# Patient Record
Sex: FEMALE | Race: WHITE | NOT HISPANIC OR LATINO | Employment: OTHER | URBAN - METROPOLITAN AREA
[De-identification: names, ages, dates, MRNs, and addresses within clinical notes are randomized per-mention and may not be internally consistent; named-entity substitution may affect disease eponyms.]

---

## 2017-01-01 ENCOUNTER — TELEPHONE (OUTPATIENT)
Dept: INTERNAL MEDICINE | Facility: CLINIC | Age: 82
End: 2017-01-01

## 2017-01-01 ENCOUNTER — HOSPITAL ENCOUNTER (INPATIENT)
Facility: HOSPITAL | Age: 82
LOS: 17 days | End: 2017-11-08
Attending: EMERGENCY MEDICINE | Admitting: INTERNAL MEDICINE

## 2017-01-01 ENCOUNTER — OFFICE VISIT (OUTPATIENT)
Dept: INTERNAL MEDICINE | Facility: CLINIC | Age: 82
End: 2017-01-01

## 2017-01-01 ENCOUNTER — APPOINTMENT (OUTPATIENT)
Dept: ULTRASOUND IMAGING | Facility: HOSPITAL | Age: 82
End: 2017-01-01

## 2017-01-01 ENCOUNTER — APPOINTMENT (OUTPATIENT)
Dept: CARDIOLOGY | Facility: HOSPITAL | Age: 82
End: 2017-01-01
Attending: INTERNAL MEDICINE

## 2017-01-01 ENCOUNTER — APPOINTMENT (OUTPATIENT)
Dept: GENERAL RADIOLOGY | Facility: HOSPITAL | Age: 82
End: 2017-01-01

## 2017-01-01 ENCOUNTER — EPISODE CHANGES (OUTPATIENT)
Dept: CASE MANAGEMENT | Facility: OTHER | Age: 82
End: 2017-01-01

## 2017-01-01 ENCOUNTER — APPOINTMENT (OUTPATIENT)
Dept: GENERAL RADIOLOGY | Facility: HOSPITAL | Age: 82
End: 2017-01-01
Attending: INTERNAL MEDICINE

## 2017-01-01 ENCOUNTER — HOSPITAL ENCOUNTER (INPATIENT)
Facility: HOSPITAL | Age: 82
LOS: 5 days | Discharge: HOME HEALTH | End: 2017-10-19
Attending: EMERGENCY MEDICINE | Admitting: INTERNAL MEDICINE

## 2017-01-01 ENCOUNTER — APPOINTMENT (OUTPATIENT)
Dept: CT IMAGING | Facility: HOSPITAL | Age: 82
End: 2017-01-01
Attending: INTERNAL MEDICINE

## 2017-01-01 ENCOUNTER — HOSPITAL ENCOUNTER (OUTPATIENT)
Dept: ULTRASOUND IMAGING | Facility: HOSPITAL | Age: 82
Discharge: HOME | End: 2017-01-20
Attending: INTERNAL MEDICINE | Admitting: INTERNAL MEDICINE

## 2017-01-01 VITALS
TEMPERATURE: 97.4 F | BODY MASS INDEX: 39.85 KG/M2 | HEIGHT: 60 IN | HEART RATE: 82 BPM | RESPIRATION RATE: 16 BRPM | OXYGEN SATURATION: 85 % | WEIGHT: 203 LBS | SYSTOLIC BLOOD PRESSURE: 117 MMHG | DIASTOLIC BLOOD PRESSURE: 69 MMHG

## 2017-01-01 VITALS
HEIGHT: 61 IN | DIASTOLIC BLOOD PRESSURE: 52 MMHG | BODY MASS INDEX: 38.83 KG/M2 | TEMPERATURE: 98.5 F | OXYGEN SATURATION: 91 % | SYSTOLIC BLOOD PRESSURE: 137 MMHG | WEIGHT: 205.7 LBS

## 2017-01-01 VITALS
OXYGEN SATURATION: 92 % | SYSTOLIC BLOOD PRESSURE: 119 MMHG | BODY MASS INDEX: 39.66 KG/M2 | HEART RATE: 75 BPM | DIASTOLIC BLOOD PRESSURE: 65 MMHG | WEIGHT: 202 LBS | TEMPERATURE: 98.7 F | HEIGHT: 60 IN

## 2017-01-01 VITALS
TEMPERATURE: 97.4 F | BODY MASS INDEX: 40.22 KG/M2 | RESPIRATION RATE: 20 BRPM | OXYGEN SATURATION: 92 % | SYSTOLIC BLOOD PRESSURE: 116 MMHG | DIASTOLIC BLOOD PRESSURE: 53 MMHG | HEART RATE: 89 BPM | WEIGHT: 213 LBS | HEIGHT: 61 IN

## 2017-01-01 DIAGNOSIS — I50.32 CHRONIC DIASTOLIC HEART FAILURE (HCC): ICD-10-CM

## 2017-01-01 DIAGNOSIS — R26.2 DIFFICULTY WALKING: ICD-10-CM

## 2017-01-01 DIAGNOSIS — R14.0 ABDOMINAL BLOATING: ICD-10-CM

## 2017-01-01 DIAGNOSIS — J44.9 COPD, SEVERE (HCC): ICD-10-CM

## 2017-01-01 DIAGNOSIS — I25.10 ATHEROSCLEROSIS OF NATIVE CORONARY ARTERY OF NATIVE HEART WITHOUT ANGINA PECTORIS: ICD-10-CM

## 2017-01-01 DIAGNOSIS — R33.9 URINARY RETENTION: Primary | ICD-10-CM

## 2017-01-01 DIAGNOSIS — R09.02 HYPOXIA: ICD-10-CM

## 2017-01-01 DIAGNOSIS — J18.9 PNEUMONIA OF RIGHT LOWER LOBE DUE TO INFECTIOUS ORGANISM: Primary | ICD-10-CM

## 2017-01-01 DIAGNOSIS — J96.21 ACUTE ON CHRONIC RESPIRATORY FAILURE WITH HYPOXIA (HCC): ICD-10-CM

## 2017-01-01 DIAGNOSIS — I50.33 ACUTE ON CHRONIC DIASTOLIC HEART FAILURE (HCC): ICD-10-CM

## 2017-01-01 DIAGNOSIS — Z99.3 WHEELCHAIR BOUND: ICD-10-CM

## 2017-01-01 DIAGNOSIS — R26.89 DECREASED MOBILITY: ICD-10-CM

## 2017-01-01 DIAGNOSIS — Z00.00 MEDICARE ANNUAL WELLNESS VISIT, INITIAL: Primary | ICD-10-CM

## 2017-01-01 DIAGNOSIS — I48.92 ATRIAL FLUTTER, PAROXYSMAL (HCC): ICD-10-CM

## 2017-01-01 DIAGNOSIS — R60.0 BILATERAL LEG EDEMA: ICD-10-CM

## 2017-01-01 DIAGNOSIS — I10 ESSENTIAL HYPERTENSION: Primary | ICD-10-CM

## 2017-01-01 DIAGNOSIS — Z99.81 ON HOME O2: ICD-10-CM

## 2017-01-01 DIAGNOSIS — M81.0 OP (OSTEOPOROSIS): ICD-10-CM

## 2017-01-01 DIAGNOSIS — M54.50 CHRONIC BILATERAL LOW BACK PAIN WITHOUT SCIATICA: ICD-10-CM

## 2017-01-01 DIAGNOSIS — I50.21 ACUTE SYSTOLIC CONGESTIVE HEART FAILURE (HCC): Primary | ICD-10-CM

## 2017-01-01 DIAGNOSIS — I10 ESSENTIAL HYPERTENSION: ICD-10-CM

## 2017-01-01 DIAGNOSIS — G89.29 CHRONIC BILATERAL LOW BACK PAIN WITHOUT SCIATICA: ICD-10-CM

## 2017-01-01 LAB
ALBUMIN SERPL-MCNC: 3.5 G/DL (ref 3.5–5.2)
ALBUMIN SERPL-MCNC: 3.5 G/DL (ref 3.5–5.2)
ALBUMIN SERPL-MCNC: 3.7 G/DL (ref 3.5–5.2)
ALBUMIN SERPL-MCNC: 3.8 G/DL (ref 3.5–5.2)
ALBUMIN SERPL-MCNC: 3.8 G/DL (ref 3.5–5.2)
ALBUMIN/GLOB SERPL: 0.9 G/DL
ALBUMIN/GLOB SERPL: 1.2 G/DL
ALP SERPL-CCNC: 76 U/L (ref 39–117)
ALP SERPL-CCNC: 77 U/L (ref 39–117)
ALP SERPL-CCNC: 78 U/L (ref 39–117)
ALP SERPL-CCNC: 82 U/L (ref 39–117)
ALP SERPL-CCNC: 87 U/L (ref 39–117)
ALT SERPL W P-5'-P-CCNC: 18 U/L (ref 1–33)
ALT SERPL W P-5'-P-CCNC: 22 U/L (ref 1–33)
ALT SERPL W P-5'-P-CCNC: 24 U/L (ref 1–33)
ALT SERPL-CCNC: 10 U/L (ref 1–33)
ALT SERPL-CCNC: 14 U/L (ref 1–33)
ANION GAP SERPL CALCULATED.3IONS-SCNC: 10.7 MMOL/L
ANION GAP SERPL CALCULATED.3IONS-SCNC: 10.8 MMOL/L
ANION GAP SERPL CALCULATED.3IONS-SCNC: 11.5 MMOL/L
ANION GAP SERPL CALCULATED.3IONS-SCNC: 11.8 MMOL/L
ANION GAP SERPL CALCULATED.3IONS-SCNC: 5 MMOL/L
ANION GAP SERPL CALCULATED.3IONS-SCNC: 6.4 MMOL/L
ANION GAP SERPL CALCULATED.3IONS-SCNC: 7.3 MMOL/L
ANION GAP SERPL CALCULATED.3IONS-SCNC: 7.5 MMOL/L
ANION GAP SERPL CALCULATED.3IONS-SCNC: 7.5 MMOL/L
ANION GAP SERPL CALCULATED.3IONS-SCNC: 7.6 MMOL/L
ANION GAP SERPL CALCULATED.3IONS-SCNC: 8.4 MMOL/L
ANION GAP SERPL CALCULATED.3IONS-SCNC: 8.5 MMOL/L
ANION GAP SERPL CALCULATED.3IONS-SCNC: 8.5 MMOL/L
ANION GAP SERPL CALCULATED.3IONS-SCNC: 8.6 MMOL/L
ANION GAP SERPL CALCULATED.3IONS-SCNC: 8.7 MMOL/L
ANION GAP SERPL CALCULATED.3IONS-SCNC: 8.7 MMOL/L
ANION GAP SERPL CALCULATED.3IONS-SCNC: 8.8 MMOL/L
ANION GAP SERPL CALCULATED.3IONS-SCNC: 9.1 MMOL/L
ANION GAP SERPL CALCULATED.3IONS-SCNC: 9.1 MMOL/L
ANION GAP SERPL CALCULATED.3IONS-SCNC: 9.3 MMOL/L
ANION GAP SERPL CALCULATED.3IONS-SCNC: 9.6 MMOL/L
ANION GAP SERPL CALCULATED.3IONS-SCNC: 9.9 MMOL/L
APTT PPP: 24.6 SECONDS (ref 22.7–35.4)
ARTERIAL PATENCY WRIST A: POSITIVE
ASCENDING AORTA: 3.4 CM
AST SERPL-CCNC: 15 U/L (ref 1–32)
AST SERPL-CCNC: 19 U/L (ref 1–32)
AST SERPL-CCNC: 22 U/L (ref 1–32)
AST SERPL-CCNC: 22 U/L (ref 1–32)
AST SERPL-CCNC: 23 U/L (ref 1–32)
ATMOSPHERIC PRESS: 743 MMHG
ATMOSPHERIC PRESS: 744.5 MMHG
ATMOSPHERIC PRESS: 749.7 MMHG
B PERT DNA SPEC QL NAA+PROBE: NOT DETECTED
B PERT DNA SPEC QL NAA+PROBE: NOT DETECTED
BACTERIA SPEC AEROBE CULT: NORMAL
BACTERIA SPEC AEROBE CULT: NORMAL
BASE EXCESS BLDA CALC-SCNC: 13 MMOL/L (ref 0–2)
BASE EXCESS BLDA CALC-SCNC: 16.5 MMOL/L (ref 0–2)
BASE EXCESS BLDA CALC-SCNC: 17.5 MMOL/L (ref 0–2)
BASOPHILS # BLD AUTO: 0 10*3/MM3 (ref 0–0.2)
BASOPHILS # BLD AUTO: 0.01 10*3/MM3 (ref 0–0.2)
BASOPHILS # BLD AUTO: 0.02 10*3/MM3 (ref 0–0.2)
BASOPHILS # BLD AUTO: 0.03 10*3/MM3 (ref 0–0.2)
BASOPHILS NFR BLD AUTO: 0 % (ref 0–1.5)
BASOPHILS NFR BLD AUTO: 0.1 % (ref 0–1.5)
BASOPHILS NFR BLD AUTO: 0.2 % (ref 0–1.5)
BASOPHILS NFR BLD AUTO: 0.3 % (ref 0–1.5)
BASOPHILS NFR BLD AUTO: 0.3 % (ref 0–1.5)
BASOPHILS NFR BLD AUTO: 0.4 % (ref 0–1.5)
BDY SITE: ABNORMAL
BH CV ECHO MEAS - ACS: 1.4 CM
BH CV ECHO MEAS - AI DEC SLOPE: 241 CM/SEC^2
BH CV ECHO MEAS - AI MAX PG: 46.8 MMHG
BH CV ECHO MEAS - AI MAX VEL: 342 CM/SEC
BH CV ECHO MEAS - AI P1/2T: 415.6 MSEC
BH CV ECHO MEAS - AO MAX PG (FULL): 4.9 MMHG
BH CV ECHO MEAS - AO MAX PG: 7.3 MMHG
BH CV ECHO MEAS - AO MEAN PG (FULL): 3 MMHG
BH CV ECHO MEAS - AO MEAN PG: 4 MMHG
BH CV ECHO MEAS - AO ROOT AREA (BSA CORRECTED): 1.7
BH CV ECHO MEAS - AO ROOT AREA: 8.6 CM^2
BH CV ECHO MEAS - AO ROOT DIAM: 3.3 CM
BH CV ECHO MEAS - AO V2 MAX: 135 CM/SEC
BH CV ECHO MEAS - AO V2 MEAN: 99.1 CM/SEC
BH CV ECHO MEAS - AO V2 VTI: 23.7 CM
BH CV ECHO MEAS - ASC AORTA: 3.4 CM
BH CV ECHO MEAS - AVA(I,A): 1.3 CM^2
BH CV ECHO MEAS - AVA(I,D): 1.3 CM^2
BH CV ECHO MEAS - AVA(V,A): 1.4 CM^2
BH CV ECHO MEAS - AVA(V,D): 1.4 CM^2
BH CV ECHO MEAS - BSA(HAYCOCK): 2.1 M^2
BH CV ECHO MEAS - BSA: 2 M^2
BH CV ECHO MEAS - BZI_BMI: 41 KILOGRAMS/M^2
BH CV ECHO MEAS - BZI_METRIC_HEIGHT: 154.9 CM
BH CV ECHO MEAS - BZI_METRIC_WEIGHT: 98.4 KG
BH CV ECHO MEAS - CONTRAST EF (2CH): 62.5 ML/M^2
BH CV ECHO MEAS - CONTRAST EF 4CH: 57.6 ML/M^2
BH CV ECHO MEAS - EDV(CUBED): 64 ML
BH CV ECHO MEAS - EDV(MOD-SP2): 64 ML
BH CV ECHO MEAS - EDV(MOD-SP4): 66 ML
BH CV ECHO MEAS - EDV(TEICH): 70 ML
BH CV ECHO MEAS - EF(CUBED): 72.5 %
BH CV ECHO MEAS - EF(MOD-SP2): 62.5 %
BH CV ECHO MEAS - EF(MOD-SP4): 57.6 %
BH CV ECHO MEAS - EF(TEICH): 64.8 %
BH CV ECHO MEAS - ESV(CUBED): 17.6 ML
BH CV ECHO MEAS - ESV(MOD-SP2): 24 ML
BH CV ECHO MEAS - ESV(MOD-SP4): 28 ML
BH CV ECHO MEAS - ESV(TEICH): 24.6 ML
BH CV ECHO MEAS - FS: 35 %
BH CV ECHO MEAS - IVS/LVPW: 1.6
BH CV ECHO MEAS - IVSD: 1.1 CM
BH CV ECHO MEAS - LAT PEAK E' VEL: 13 CM/SEC
BH CV ECHO MEAS - LV DIASTOLIC VOL/BSA (35-75): 33.7 ML/M^2
BH CV ECHO MEAS - LV MASS(C)D: 109.7 GRAMS
BH CV ECHO MEAS - LV MASS(C)DI: 56.1 GRAMS/M^2
BH CV ECHO MEAS - LV MAX PG: 2.4 MMHG
BH CV ECHO MEAS - LV MEAN PG: 1 MMHG
BH CV ECHO MEAS - LV SYSTOLIC VOL/BSA (12-30): 14.3 ML/M^2
BH CV ECHO MEAS - LV V1 MAX: 76.9 CM/SEC
BH CV ECHO MEAS - LV V1 MEAN: 49.7 CM/SEC
BH CV ECHO MEAS - LV V1 VTI: 12.3 CM
BH CV ECHO MEAS - LVIDD: 4 CM
BH CV ECHO MEAS - LVIDS: 2.6 CM
BH CV ECHO MEAS - LVLD AP2: 7.1 CM
BH CV ECHO MEAS - LVLD AP4: 6.9 CM
BH CV ECHO MEAS - LVLS AP2: 5.7 CM
BH CV ECHO MEAS - LVLS AP4: 5.6 CM
BH CV ECHO MEAS - LVOT AREA (M): 2.5 CM^2
BH CV ECHO MEAS - LVOT AREA: 2.5 CM^2
BH CV ECHO MEAS - LVOT DIAM: 1.8 CM
BH CV ECHO MEAS - LVPWD: 0.7 CM
BH CV ECHO MEAS - MED PEAK E' VEL: 9 CM/SEC
BH CV ECHO MEAS - MR MAX PG: 85 MMHG
BH CV ECHO MEAS - MR MAX VEL: 461 CM/SEC
BH CV ECHO MEAS - MV DEC SLOPE: 555 CM/SEC^2
BH CV ECHO MEAS - MV DEC TIME: 0.17 SEC
BH CV ECHO MEAS - MV E MAX VEL: 111 CM/SEC
BH CV ECHO MEAS - MV MAX PG: 5.7 MMHG
BH CV ECHO MEAS - MV MEAN PG: 3 MMHG
BH CV ECHO MEAS - MV P1/2T MAX VEL: 123 CM/SEC
BH CV ECHO MEAS - MV P1/2T: 64.9 MSEC
BH CV ECHO MEAS - MV V2 MAX: 119 CM/SEC
BH CV ECHO MEAS - MV V2 MEAN: 87 CM/SEC
BH CV ECHO MEAS - MV V2 VTI: 17.7 CM
BH CV ECHO MEAS - MVA P1/2T LCG: 1.8 CM^2
BH CV ECHO MEAS - MVA(P1/2T): 3.4 CM^2
BH CV ECHO MEAS - MVA(VTI): 1.8 CM^2
BH CV ECHO MEAS - PA ACC TIME: 0.05 SEC
BH CV ECHO MEAS - PA MAX PG (FULL): 0.64 MMHG
BH CV ECHO MEAS - PA MAX PG: 1.9 MMHG
BH CV ECHO MEAS - PA PR(ACCEL): 55.2 MMHG
BH CV ECHO MEAS - PA V2 MAX: 69.2 CM/SEC
BH CV ECHO MEAS - PI END-D VEL: 133 CM/SEC
BH CV ECHO MEAS - PVA(V,A): 1.4 CM^2
BH CV ECHO MEAS - PVA(V,D): 1.4 CM^2
BH CV ECHO MEAS - QP/QS: 0.48
BH CV ECHO MEAS - RAP SYSTOLE: 8 MMHG
BH CV ECHO MEAS - RV MAX PG: 1.3 MMHG
BH CV ECHO MEAS - RV MEAN PG: 0.5 MMHG
BH CV ECHO MEAS - RV V1 MAX: 55.8 CM/SEC
BH CV ECHO MEAS - RV V1 MEAN: 33.7 CM/SEC
BH CV ECHO MEAS - RV V1 VTI: 8.4 CM
BH CV ECHO MEAS - RVOT AREA: 1.8 CM^2
BH CV ECHO MEAS - RVOT DIAM: 1.5 CM
BH CV ECHO MEAS - RVSP: 42.8 MMHG
BH CV ECHO MEAS - SI(AO): 103.6 ML/M^2
BH CV ECHO MEAS - SI(CUBED): 23.7 ML/M^2
BH CV ECHO MEAS - SI(LVOT): 16 ML/M^2
BH CV ECHO MEAS - SI(MOD-SP2): 20.5 ML/M^2
BH CV ECHO MEAS - SI(MOD-SP4): 19.4 ML/M^2
BH CV ECHO MEAS - SI(TEICH): 23.2 ML/M^2
BH CV ECHO MEAS - SV(AO): 202.7 ML
BH CV ECHO MEAS - SV(CUBED): 46.4 ML
BH CV ECHO MEAS - SV(LVOT): 31.3 ML
BH CV ECHO MEAS - SV(MOD-SP2): 40 ML
BH CV ECHO MEAS - SV(MOD-SP4): 38 ML
BH CV ECHO MEAS - SV(RVOT): 14.9 ML
BH CV ECHO MEAS - SV(TEICH): 45.4 ML
BH CV ECHO MEAS - TAPSE (>1.6): 1 CM2
BH CV ECHO MEAS - TR MAX VEL: 295 CM/SEC
BH CV LOWER VASCULAR LEFT COMMON FEMORAL AUGMENT: NORMAL
BH CV LOWER VASCULAR LEFT COMMON FEMORAL COMPETENT: NORMAL
BH CV LOWER VASCULAR LEFT COMMON FEMORAL COMPRESS: NORMAL
BH CV LOWER VASCULAR LEFT COMMON FEMORAL PHASIC: NORMAL
BH CV LOWER VASCULAR LEFT COMMON FEMORAL SPONT: NORMAL
BH CV LOWER VASCULAR LEFT DISTAL FEMORAL COMPRESS: NORMAL
BH CV LOWER VASCULAR LEFT GASTRONEMIUS COMPRESS: NORMAL
BH CV LOWER VASCULAR LEFT GREATER SAPH AK COMPRESS: NORMAL
BH CV LOWER VASCULAR LEFT GREATER SAPH BK COMPRESS: NORMAL
BH CV LOWER VASCULAR LEFT MID FEMORAL AUGMENT: NORMAL
BH CV LOWER VASCULAR LEFT MID FEMORAL COMPETENT: NORMAL
BH CV LOWER VASCULAR LEFT MID FEMORAL COMPRESS: NORMAL
BH CV LOWER VASCULAR LEFT MID FEMORAL PHASIC: NORMAL
BH CV LOWER VASCULAR LEFT MID FEMORAL SPONT: NORMAL
BH CV LOWER VASCULAR LEFT PERONEAL COMPRESS: NORMAL
BH CV LOWER VASCULAR LEFT POPLITEAL AUGMENT: NORMAL
BH CV LOWER VASCULAR LEFT POPLITEAL COMPETENT: NORMAL
BH CV LOWER VASCULAR LEFT POPLITEAL COMPRESS: NORMAL
BH CV LOWER VASCULAR LEFT POPLITEAL PHASIC: NORMAL
BH CV LOWER VASCULAR LEFT POPLITEAL SPONT: NORMAL
BH CV LOWER VASCULAR LEFT POSTERIOR TIBIAL COMPRESS: NORMAL
BH CV LOWER VASCULAR LEFT PROXIMAL FEMORAL COMPRESS: NORMAL
BH CV LOWER VASCULAR LEFT SAPHENOFEMORAL JUNCTION AUGMENT: NORMAL
BH CV LOWER VASCULAR LEFT SAPHENOFEMORAL JUNCTION COMPRESS: NORMAL
BH CV LOWER VASCULAR LEFT SAPHENOFEMORAL JUNCTION PHASIC: NORMAL
BH CV LOWER VASCULAR LEFT SAPHENOFEMORAL JUNCTION SPONT: NORMAL
BH CV LOWER VASCULAR RIGHT COMMON FEMORAL AUGMENT: NORMAL
BH CV LOWER VASCULAR RIGHT COMMON FEMORAL COMPETENT: NORMAL
BH CV LOWER VASCULAR RIGHT COMMON FEMORAL COMPRESS: NORMAL
BH CV LOWER VASCULAR RIGHT COMMON FEMORAL PHASIC: NORMAL
BH CV LOWER VASCULAR RIGHT COMMON FEMORAL SPONT: NORMAL
BH CV LOWER VASCULAR RIGHT DISTAL FEMORAL COMPRESS: NORMAL
BH CV LOWER VASCULAR RIGHT GASTRONEMIUS COMPRESS: NORMAL
BH CV LOWER VASCULAR RIGHT GREATER SAPH AK COMPRESS: NORMAL
BH CV LOWER VASCULAR RIGHT GREATER SAPH BK COMPRESS: NORMAL
BH CV LOWER VASCULAR RIGHT MID FEMORAL AUGMENT: NORMAL
BH CV LOWER VASCULAR RIGHT MID FEMORAL COMPETENT: NORMAL
BH CV LOWER VASCULAR RIGHT MID FEMORAL COMPRESS: NORMAL
BH CV LOWER VASCULAR RIGHT MID FEMORAL PHASIC: NORMAL
BH CV LOWER VASCULAR RIGHT MID FEMORAL SPONT: NORMAL
BH CV LOWER VASCULAR RIGHT PERONEAL COMPRESS: NORMAL
BH CV LOWER VASCULAR RIGHT POPLITEAL AUGMENT: NORMAL
BH CV LOWER VASCULAR RIGHT POPLITEAL COMPETENT: NORMAL
BH CV LOWER VASCULAR RIGHT POPLITEAL COMPRESS: NORMAL
BH CV LOWER VASCULAR RIGHT POPLITEAL PHASIC: NORMAL
BH CV LOWER VASCULAR RIGHT POPLITEAL SPONT: NORMAL
BH CV LOWER VASCULAR RIGHT POSTERIOR TIBIAL COMPRESS: NORMAL
BH CV LOWER VASCULAR RIGHT PROXIMAL FEMORAL COMPRESS: NORMAL
BH CV LOWER VASCULAR RIGHT SAPHENOFEMORAL JUNCTION AUGMENT: NORMAL
BH CV LOWER VASCULAR RIGHT SAPHENOFEMORAL JUNCTION COMPRESS: NORMAL
BH CV LOWER VASCULAR RIGHT SAPHENOFEMORAL JUNCTION PHASIC: NORMAL
BH CV LOWER VASCULAR RIGHT SAPHENOFEMORAL JUNCTION SPONT: NORMAL
BH CV XLRA - RV BASE: 3.32 CM
BH CV XLRA - RV LENGTH: 7.66 CM
BH CV XLRA - RV MID: 3.97 CM
BH CV XLRA - TDI S': 9 CM/SEC
BILIRUB DIRECT SERPL-MCNC: <0.2 MG/DL (ref 0–0.3)
BILIRUB SERPL-MCNC: 0.2 MG/DL (ref 0.1–1.2)
BILIRUB SERPL-MCNC: 0.2 MG/DL (ref 0.1–1.2)
BILIRUB SERPL-MCNC: 0.6 MG/DL (ref 0.1–1.2)
BILIRUB SERPL-MCNC: 0.6 MG/DL (ref 0.1–1.2)
BILIRUB SERPL-MCNC: 0.7 MG/DL (ref 0.1–1.2)
BILIRUB UR QL STRIP: NEGATIVE
BILIRUB UR QL STRIP: NEGATIVE
BUN BLD-MCNC: 24 MG/DL (ref 8–23)
BUN BLD-MCNC: 25 MG/DL (ref 8–23)
BUN BLD-MCNC: 26 MG/DL (ref 8–23)
BUN BLD-MCNC: 26 MG/DL (ref 8–23)
BUN BLD-MCNC: 28 MG/DL (ref 8–23)
BUN BLD-MCNC: 30 MG/DL (ref 8–23)
BUN BLD-MCNC: 31 MG/DL (ref 8–23)
BUN BLD-MCNC: 32 MG/DL (ref 8–23)
BUN BLD-MCNC: 32 MG/DL (ref 8–23)
BUN BLD-MCNC: 33 MG/DL (ref 8–23)
BUN BLD-MCNC: 34 MG/DL (ref 8–23)
BUN BLD-MCNC: 35 MG/DL (ref 8–23)
BUN BLD-MCNC: 36 MG/DL (ref 8–23)
BUN BLD-MCNC: 36 MG/DL (ref 8–23)
BUN BLD-MCNC: 38 MG/DL (ref 8–23)
BUN BLD-MCNC: 39 MG/DL (ref 8–23)
BUN BLD-MCNC: 39 MG/DL (ref 8–23)
BUN SERPL-MCNC: 22 MG/DL (ref 8–23)
BUN/CREAT SERPL: 22.1 (ref 7–25)
BUN/CREAT SERPL: 23.4 (ref 7–25)
BUN/CREAT SERPL: 25.6 (ref 7–25)
BUN/CREAT SERPL: 26.7 (ref 7–25)
BUN/CREAT SERPL: 28.3 (ref 7–25)
BUN/CREAT SERPL: 29.1 (ref 7–25)
BUN/CREAT SERPL: 29.2 (ref 7–25)
BUN/CREAT SERPL: 29.4 (ref 7–25)
BUN/CREAT SERPL: 32.6 (ref 7–25)
BUN/CREAT SERPL: 32.6 (ref 7–25)
BUN/CREAT SERPL: 33.3 (ref 7–25)
BUN/CREAT SERPL: 35.1 (ref 7–25)
BUN/CREAT SERPL: 38.1 (ref 7–25)
BUN/CREAT SERPL: 39 (ref 7–25)
BUN/CREAT SERPL: 39.2 (ref 7–25)
BUN/CREAT SERPL: 40.9 (ref 7–25)
BUN/CREAT SERPL: 41.8 (ref 7–25)
BUN/CREAT SERPL: 42.2 (ref 7–25)
BUN/CREAT SERPL: 44.3 (ref 7–25)
BUN/CREAT SERPL: 45 (ref 7–25)
BUN/CREAT SERPL: 45.3 (ref 7–25)
BUN/CREAT SERPL: 50 (ref 7–25)
BUN/CREAT SERPL: 51.5 (ref 7–25)
C PNEUM DNA NPH QL NAA+NON-PROBE: NOT DETECTED
C PNEUM DNA NPH QL NAA+NON-PROBE: NOT DETECTED
CALCIUM SERPL-MCNC: 9.3 MG/DL (ref 8.6–10.5)
CALCIUM SPEC-SCNC: 10 MG/DL (ref 8.6–10.5)
CALCIUM SPEC-SCNC: 10.1 MG/DL (ref 8.6–10.5)
CALCIUM SPEC-SCNC: 10.1 MG/DL (ref 8.6–10.5)
CALCIUM SPEC-SCNC: 7.9 MG/DL (ref 8.6–10.5)
CALCIUM SPEC-SCNC: 8.2 MG/DL (ref 8.6–10.5)
CALCIUM SPEC-SCNC: 8.4 MG/DL (ref 8.6–10.5)
CALCIUM SPEC-SCNC: 8.5 MG/DL (ref 8.6–10.5)
CALCIUM SPEC-SCNC: 8.6 MG/DL (ref 8.6–10.5)
CALCIUM SPEC-SCNC: 8.7 MG/DL (ref 8.6–10.5)
CALCIUM SPEC-SCNC: 8.8 MG/DL (ref 8.6–10.5)
CALCIUM SPEC-SCNC: 9 MG/DL (ref 8.6–10.5)
CALCIUM SPEC-SCNC: 9.2 MG/DL (ref 8.6–10.5)
CALCIUM SPEC-SCNC: 9.2 MG/DL (ref 8.6–10.5)
CALCIUM SPEC-SCNC: 9.3 MG/DL (ref 8.6–10.5)
CALCIUM SPEC-SCNC: 9.3 MG/DL (ref 8.6–10.5)
CALCIUM SPEC-SCNC: 9.4 MG/DL (ref 8.6–10.5)
CALCIUM SPEC-SCNC: 9.4 MG/DL (ref 8.6–10.5)
CALCIUM SPEC-SCNC: 9.5 MG/DL (ref 8.6–10.5)
CALCIUM SPEC-SCNC: 9.9 MG/DL (ref 8.6–10.5)
CALCIUM SPEC-SCNC: 9.9 MG/DL (ref 8.6–10.5)
CHLORIDE SERPL-SCNC: 100 MMOL/L (ref 98–107)
CHLORIDE SERPL-SCNC: 101 MMOL/L (ref 98–107)
CHLORIDE SERPL-SCNC: 103 MMOL/L (ref 98–107)
CHLORIDE SERPL-SCNC: 93 MMOL/L (ref 98–107)
CHLORIDE SERPL-SCNC: 93 MMOL/L (ref 98–107)
CHLORIDE SERPL-SCNC: 96 MMOL/L (ref 98–107)
CHLORIDE SERPL-SCNC: 97 MMOL/L (ref 98–107)
CHLORIDE SERPL-SCNC: 97 MMOL/L (ref 98–107)
CHLORIDE SERPL-SCNC: 98 MMOL/L (ref 98–107)
CHLORIDE SERPL-SCNC: 99 MMOL/L (ref 98–107)
CHOLEST SERPL-MCNC: 128 MG/DL (ref 0–200)
CLARITY UR: CLEAR
CLARITY UR: CLEAR
CO2 SERPL-SCNC: 32.9 MMOL/L (ref 22–29)
CO2 SERPL-SCNC: 33.3 MMOL/L (ref 22–29)
CO2 SERPL-SCNC: 33.5 MMOL/L (ref 22–29)
CO2 SERPL-SCNC: 35.3 MMOL/L (ref 22–29)
CO2 SERPL-SCNC: 35.6 MMOL/L (ref 22–29)
CO2 SERPL-SCNC: 37.9 MMOL/L (ref 22–29)
CO2 SERPL-SCNC: 37.9 MMOL/L (ref 22–29)
CO2 SERPL-SCNC: 38.2 MMOL/L (ref 22–29)
CO2 SERPL-SCNC: 39.2 MMOL/L (ref 22–29)
CO2 SERPL-SCNC: 39.4 MMOL/L (ref 22–29)
CO2 SERPL-SCNC: 39.6 MMOL/L (ref 22–29)
CO2 SERPL-SCNC: 39.7 MMOL/L (ref 22–29)
CO2 SERPL-SCNC: 40.1 MMOL/L (ref 22–29)
CO2 SERPL-SCNC: 40.3 MMOL/L (ref 22–29)
CO2 SERPL-SCNC: 40.5 MMOL/L (ref 22–29)
CO2 SERPL-SCNC: 40.5 MMOL/L (ref 22–29)
CO2 SERPL-SCNC: 40.7 MMOL/L (ref 22–29)
CO2 SERPL-SCNC: 41.4 MMOL/L (ref 22–29)
CO2 SERPL-SCNC: 42.4 MMOL/L (ref 22–29)
CO2 SERPL-SCNC: 43 MMOL/L (ref 22–29)
CO2 SERPL-SCNC: 44.5 MMOL/L (ref 22–29)
CO2 SERPL-SCNC: 45.2 MMOL/L (ref 22–29)
CO2 SERPL-SCNC: 47.5 MMOL/L (ref 22–29)
COLOR UR: YELLOW
COLOR UR: YELLOW
CREAT BLD-MCNC: 0.63 MG/DL (ref 0.57–1)
CREAT BLD-MCNC: 0.68 MG/DL (ref 0.57–1)
CREAT BLD-MCNC: 0.75 MG/DL (ref 0.57–1)
CREAT BLD-MCNC: 0.78 MG/DL (ref 0.57–1)
CREAT BLD-MCNC: 0.78 MG/DL (ref 0.57–1)
CREAT BLD-MCNC: 0.8 MG/DL (ref 0.57–1)
CREAT BLD-MCNC: 0.82 MG/DL (ref 0.57–1)
CREAT BLD-MCNC: 0.85 MG/DL (ref 0.57–1)
CREAT BLD-MCNC: 0.88 MG/DL (ref 0.57–1)
CREAT BLD-MCNC: 0.88 MG/DL (ref 0.57–1)
CREAT BLD-MCNC: 0.9 MG/DL (ref 0.57–1)
CREAT BLD-MCNC: 0.91 MG/DL (ref 0.57–1)
CREAT BLD-MCNC: 0.94 MG/DL (ref 0.57–1)
CREAT BLD-MCNC: 0.95 MG/DL (ref 0.57–1)
CREAT BLD-MCNC: 0.95 MG/DL (ref 0.57–1)
CREAT BLD-MCNC: 0.96 MG/DL (ref 0.57–1)
CREAT BLD-MCNC: 0.97 MG/DL (ref 0.57–1)
CREAT BLD-MCNC: 1.06 MG/DL (ref 0.57–1)
CREAT BLD-MCNC: 1.1 MG/DL (ref 0.57–1)
CREAT BLD-MCNC: 1.11 MG/DL (ref 0.57–1)
CREAT BLD-MCNC: 1.16 MG/DL (ref 0.57–1)
CREAT BLD-MCNC: 1.4 MG/DL (ref 0.57–1)
CREAT SERPL-MCNC: 0.86 MG/DL (ref 0.57–1)
D DIMER PPP FEU-MCNC: 0.56 MCGFEU/ML (ref 0–0.49)
D-LACTATE SERPL-SCNC: 0.7 MMOL/L (ref 0.5–2)
D-LACTATE SERPL-SCNC: 1.3 MMOL/L (ref 0.5–2)
DEPRECATED RDW RBC AUTO: 62.4 FL (ref 37–54)
DEPRECATED RDW RBC AUTO: 62.6 FL (ref 37–54)
DEPRECATED RDW RBC AUTO: 62.9 FL (ref 37–54)
DEPRECATED RDW RBC AUTO: 63.3 FL (ref 37–54)
DEPRECATED RDW RBC AUTO: 63.5 FL (ref 37–54)
DEPRECATED RDW RBC AUTO: 64.1 FL (ref 37–54)
DEPRECATED RDW RBC AUTO: 64.1 FL (ref 37–54)
DEPRECATED RDW RBC AUTO: 64.4 FL (ref 37–54)
DEPRECATED RDW RBC AUTO: 64.7 FL (ref 37–54)
DEPRECATED RDW RBC AUTO: 64.7 FL (ref 37–54)
DEPRECATED RDW RBC AUTO: 65 FL (ref 37–54)
DEPRECATED RDW RBC AUTO: 65.3 FL (ref 37–54)
DIGOXIN SERPL-MCNC: 0.8 NG/ML (ref 0.6–1.2)
DIGOXIN SERPL-MCNC: 0.9 NG/ML (ref 0.6–1.2)
DIGOXIN SERPL-MCNC: 0.9 NG/ML (ref 0.6–1.2)
E/E' RATIO: 11
EOSINOPHIL # BLD AUTO: 0 10*3/MM3 (ref 0–0.7)
EOSINOPHIL # BLD AUTO: 0.01 10*3/MM3 (ref 0–0.7)
EOSINOPHIL # BLD AUTO: 0.08 10*3/MM3 (ref 0–0.7)
EOSINOPHIL # BLD AUTO: 0.09 10*3/MM3 (ref 0–0.7)
EOSINOPHIL # BLD AUTO: 0.1 10*3/MM3 (ref 0–0.7)
EOSINOPHIL # BLD AUTO: 0.13 10*3/MM3 (ref 0–0.7)
EOSINOPHIL # BLD AUTO: 0.19 10*3/MM3 (ref 0–0.7)
EOSINOPHIL # BLD AUTO: 0.2 10*3/MM3 (ref 0–0.7)
EOSINOPHIL # BLD AUTO: 0.24 10*3/MM3 (ref 0–0.7)
EOSINOPHIL # BLD AUTO: 0.25 10*3/MM3 (ref 0–0.7)
EOSINOPHIL # BLD AUTO: 0.32 10*3/MM3 (ref 0–0.7)
EOSINOPHIL NFR BLD AUTO: 0 % (ref 0.3–6.2)
EOSINOPHIL NFR BLD AUTO: 0.1 % (ref 0.3–6.2)
EOSINOPHIL NFR BLD AUTO: 1 % (ref 0.3–6.2)
EOSINOPHIL NFR BLD AUTO: 1.1 % (ref 0.3–6.2)
EOSINOPHIL NFR BLD AUTO: 1.1 % (ref 0.3–6.2)
EOSINOPHIL NFR BLD AUTO: 1.8 % (ref 0.3–6.2)
EOSINOPHIL NFR BLD AUTO: 2.6 % (ref 0.3–6.2)
EOSINOPHIL NFR BLD AUTO: 3 % (ref 0.3–6.2)
EOSINOPHIL NFR BLD AUTO: 3.1 % (ref 0.3–6.2)
EOSINOPHIL NFR BLD AUTO: 3.1 % (ref 0.3–6.2)
EOSINOPHIL NFR BLD AUTO: 3.8 % (ref 0.3–6.2)
ERYTHROCYTE [DISTWIDTH] IN BLOOD BY AUTOMATED COUNT: 16.3 % (ref 11.7–13)
ERYTHROCYTE [DISTWIDTH] IN BLOOD BY AUTOMATED COUNT: 17 % (ref 11.7–13)
ERYTHROCYTE [DISTWIDTH] IN BLOOD BY AUTOMATED COUNT: 17.1 % (ref 11.7–13)
ERYTHROCYTE [DISTWIDTH] IN BLOOD BY AUTOMATED COUNT: 17.1 % (ref 11.7–13)
ERYTHROCYTE [DISTWIDTH] IN BLOOD BY AUTOMATED COUNT: 17.2 % (ref 11.7–13)
ERYTHROCYTE [DISTWIDTH] IN BLOOD BY AUTOMATED COUNT: 17.4 % (ref 11.7–13)
ERYTHROCYTE [DISTWIDTH] IN BLOOD BY AUTOMATED COUNT: 17.5 % (ref 11.7–13)
ERYTHROCYTE [DISTWIDTH] IN BLOOD BY AUTOMATED COUNT: 17.5 % (ref 11.7–13)
ERYTHROCYTE [DISTWIDTH] IN BLOOD BY AUTOMATED COUNT: 17.7 % (ref 11.7–13)
ERYTHROCYTE [DISTWIDTH] IN BLOOD BY AUTOMATED COUNT: 17.8 % (ref 11.7–13)
ERYTHROCYTE [DISTWIDTH] IN BLOOD BY AUTOMATED COUNT: 18 % (ref 11.7–13)
FLUAV H1 2009 PAND RNA NPH QL NAA+PROBE: NOT DETECTED
FLUAV H1 2009 PAND RNA NPH QL NAA+PROBE: NOT DETECTED
FLUAV H1 HA GENE NPH QL NAA+PROBE: NOT DETECTED
FLUAV H1 HA GENE NPH QL NAA+PROBE: NOT DETECTED
FLUAV H3 RNA NPH QL NAA+PROBE: NOT DETECTED
FLUAV H3 RNA NPH QL NAA+PROBE: NOT DETECTED
FLUAV SUBTYP SPEC NAA+PROBE: NOT DETECTED
FLUAV SUBTYP SPEC NAA+PROBE: NOT DETECTED
FLUBV RNA ISLT QL NAA+PROBE: NOT DETECTED
FLUBV RNA ISLT QL NAA+PROBE: NOT DETECTED
GAS FLOW AIRWAY: 10 LPM
GAS FLOW AIRWAY: 4 LPM
GAS FLOW AIRWAY: 8 LPM
GFR SERPL CREATININE-BSD FRML MDRD: 36 ML/MIN/1.73
GFR SERPL CREATININE-BSD FRML MDRD: 44 ML/MIN/1.73
GFR SERPL CREATININE-BSD FRML MDRD: 47 ML/MIN/1.73
GFR SERPL CREATININE-BSD FRML MDRD: 47 ML/MIN/1.73
GFR SERPL CREATININE-BSD FRML MDRD: 49 ML/MIN/1.73
GFR SERPL CREATININE-BSD FRML MDRD: 55 ML/MIN/1.73
GFR SERPL CREATININE-BSD FRML MDRD: 55 ML/MIN/1.73
GFR SERPL CREATININE-BSD FRML MDRD: 56 ML/MIN/1.73
GFR SERPL CREATININE-BSD FRML MDRD: 56 ML/MIN/1.73
GFR SERPL CREATININE-BSD FRML MDRD: 57 ML/MIN/1.73
GFR SERPL CREATININE-BSD FRML MDRD: 59 ML/MIN/1.73
GFR SERPL CREATININE-BSD FRML MDRD: 60 ML/MIN/1.73
GFR SERPL CREATININE-BSD FRML MDRD: 61 ML/MIN/1.73
GFR SERPL CREATININE-BSD FRML MDRD: 61 ML/MIN/1.73
GFR SERPL CREATININE-BSD FRML MDRD: 64 ML/MIN/1.73
GFR SERPL CREATININE-BSD FRML MDRD: 66 ML/MIN/1.73
GFR SERPL CREATININE-BSD FRML MDRD: 68 ML/MIN/1.73
GFR SERPL CREATININE-BSD FRML MDRD: 70 ML/MIN/1.73
GFR SERPL CREATININE-BSD FRML MDRD: 70 ML/MIN/1.73
GFR SERPL CREATININE-BSD FRML MDRD: 73 ML/MIN/1.73
GFR SERPL CREATININE-BSD FRML MDRD: 82 ML/MIN/1.73
GFR SERPL CREATININE-BSD FRML MDRD: 90 ML/MIN/1.73
GLOBULIN SER CALC-MCNC: 3.3 GM/DL
GLOBULIN UR ELPH-MCNC: 3.8 GM/DL
GLOBULIN UR ELPH-MCNC: 4.1 GM/DL
GLOBULIN UR ELPH-MCNC: 4.1 GM/DL
GLUCOSE BLD-MCNC: 103 MG/DL (ref 65–99)
GLUCOSE BLD-MCNC: 104 MG/DL (ref 65–99)
GLUCOSE BLD-MCNC: 109 MG/DL (ref 65–99)
GLUCOSE BLD-MCNC: 109 MG/DL (ref 65–99)
GLUCOSE BLD-MCNC: 111 MG/DL (ref 65–99)
GLUCOSE BLD-MCNC: 117 MG/DL (ref 65–99)
GLUCOSE BLD-MCNC: 118 MG/DL (ref 65–99)
GLUCOSE BLD-MCNC: 125 MG/DL (ref 65–99)
GLUCOSE BLD-MCNC: 131 MG/DL (ref 65–99)
GLUCOSE BLD-MCNC: 132 MG/DL (ref 65–99)
GLUCOSE BLD-MCNC: 147 MG/DL (ref 65–99)
GLUCOSE BLD-MCNC: 150 MG/DL (ref 65–99)
GLUCOSE BLD-MCNC: 152 MG/DL (ref 65–99)
GLUCOSE BLD-MCNC: 162 MG/DL (ref 65–99)
GLUCOSE BLD-MCNC: 175 MG/DL (ref 65–99)
GLUCOSE BLD-MCNC: 176 MG/DL (ref 65–99)
GLUCOSE BLD-MCNC: 197 MG/DL (ref 65–99)
GLUCOSE BLD-MCNC: 198 MG/DL (ref 65–99)
GLUCOSE BLD-MCNC: 214 MG/DL (ref 65–99)
GLUCOSE BLD-MCNC: 226 MG/DL (ref 65–99)
GLUCOSE BLD-MCNC: 250 MG/DL (ref 65–99)
GLUCOSE BLD-MCNC: 92 MG/DL (ref 65–99)
GLUCOSE BLDC GLUCOMTR-MCNC: 115 MG/DL (ref 70–130)
GLUCOSE BLDC GLUCOMTR-MCNC: 124 MG/DL (ref 70–130)
GLUCOSE SERPL-MCNC: 104 MG/DL (ref 65–99)
GLUCOSE UR STRIP-MCNC: NEGATIVE MG/DL
GLUCOSE UR STRIP-MCNC: NEGATIVE MG/DL
HADV DNA SPEC NAA+PROBE: NOT DETECTED
HADV DNA SPEC NAA+PROBE: NOT DETECTED
HBA1C MFR BLD: 5.9 % (ref 4.8–5.6)
HCO3 BLDA-SCNC: 43.4 MMOL/L (ref 22–28)
HCO3 BLDA-SCNC: 44.9 MMOL/L (ref 22–28)
HCO3 BLDA-SCNC: 46 MMOL/L (ref 22–28)
HCOV 229E RNA SPEC QL NAA+PROBE: NOT DETECTED
HCOV 229E RNA SPEC QL NAA+PROBE: NOT DETECTED
HCOV HKU1 RNA SPEC QL NAA+PROBE: NOT DETECTED
HCOV HKU1 RNA SPEC QL NAA+PROBE: NOT DETECTED
HCOV NL63 RNA SPEC QL NAA+PROBE: NOT DETECTED
HCOV NL63 RNA SPEC QL NAA+PROBE: NOT DETECTED
HCOV OC43 RNA SPEC QL NAA+PROBE: NOT DETECTED
HCOV OC43 RNA SPEC QL NAA+PROBE: NOT DETECTED
HCT VFR BLD AUTO: 31.8 % (ref 35.6–45.5)
HCT VFR BLD AUTO: 33.4 % (ref 35.6–45.5)
HCT VFR BLD AUTO: 33.8 % (ref 35.6–45.5)
HCT VFR BLD AUTO: 33.9 % (ref 35.6–45.5)
HCT VFR BLD AUTO: 34.1 % (ref 35.6–45.5)
HCT VFR BLD AUTO: 34.4 % (ref 35.6–45.5)
HCT VFR BLD AUTO: 34.9 % (ref 35.6–45.5)
HCT VFR BLD AUTO: 34.9 % (ref 35.6–45.5)
HCT VFR BLD AUTO: 36.1 % (ref 35.6–45.5)
HCT VFR BLD AUTO: 36.5 % (ref 35.6–45.5)
HCT VFR BLD AUTO: 37.6 % (ref 35.6–45.5)
HCT VFR BLD AUTO: 38.1 % (ref 35.6–45.5)
HCT VFR BLD AUTO: 39.9 % (ref 35.6–45.5)
HDLC SERPL-MCNC: 33 MG/DL (ref 40–60)
HGB BLD-MCNC: 10.1 G/DL (ref 11.9–15.5)
HGB BLD-MCNC: 10.2 G/DL (ref 11.9–15.5)
HGB BLD-MCNC: 10.4 G/DL (ref 11.9–15.5)
HGB BLD-MCNC: 10.6 G/DL (ref 11.9–15.5)
HGB BLD-MCNC: 11 G/DL (ref 11.9–15.5)
HGB BLD-MCNC: 11.3 G/DL (ref 11.9–15.5)
HGB BLD-MCNC: 9.1 G/DL (ref 11.9–15.5)
HGB BLD-MCNC: 9.5 G/DL (ref 11.9–15.5)
HGB BLD-MCNC: 9.5 G/DL (ref 11.9–15.5)
HGB BLD-MCNC: 9.7 G/DL (ref 11.9–15.5)
HGB BLD-MCNC: 9.8 G/DL (ref 11.9–15.5)
HGB BLD-MCNC: 9.9 G/DL (ref 11.9–15.5)
HGB BLD-MCNC: 9.9 G/DL (ref 11.9–15.5)
HGB UR QL STRIP.AUTO: NEGATIVE
HGB UR QL STRIP.AUTO: NEGATIVE
HMPV RNA NPH QL NAA+NON-PROBE: NOT DETECTED
HMPV RNA NPH QL NAA+NON-PROBE: NOT DETECTED
HOLD SPECIMEN: NORMAL
HOLD SPECIMEN: NORMAL
HPIV1 RNA SPEC QL NAA+PROBE: NOT DETECTED
HPIV1 RNA SPEC QL NAA+PROBE: NOT DETECTED
HPIV2 RNA SPEC QL NAA+PROBE: NOT DETECTED
HPIV2 RNA SPEC QL NAA+PROBE: NOT DETECTED
HPIV3 RNA NPH QL NAA+PROBE: NOT DETECTED
HPIV3 RNA NPH QL NAA+PROBE: NOT DETECTED
HPIV4 P GENE NPH QL NAA+PROBE: NOT DETECTED
HPIV4 P GENE NPH QL NAA+PROBE: NOT DETECTED
IMM GRANULOCYTES # BLD: 0 10*3/MM3 (ref 0–0.03)
IMM GRANULOCYTES # BLD: 0 10*3/MM3 (ref 0–0.03)
IMM GRANULOCYTES # BLD: 0.02 10*3/MM3 (ref 0–0.03)
IMM GRANULOCYTES # BLD: 0.03 10*3/MM3 (ref 0–0.03)
IMM GRANULOCYTES # BLD: 0.03 10*3/MM3 (ref 0–0.03)
IMM GRANULOCYTES # BLD: 0.06 10*3/MM3 (ref 0–0.03)
IMM GRANULOCYTES # BLD: 0.06 10*3/MM3 (ref 0–0.03)
IMM GRANULOCYTES # BLD: 0.07 10*3/MM3 (ref 0–0.03)
IMM GRANULOCYTES NFR BLD: 0 % (ref 0–0.5)
IMM GRANULOCYTES NFR BLD: 0 % (ref 0–0.5)
IMM GRANULOCYTES NFR BLD: 0.2 % (ref 0–0.5)
IMM GRANULOCYTES NFR BLD: 0.3 % (ref 0–0.5)
IMM GRANULOCYTES NFR BLD: 0.4 % (ref 0–0.5)
IMM GRANULOCYTES NFR BLD: 0.4 % (ref 0–0.5)
IMM GRANULOCYTES NFR BLD: 0.7 % (ref 0–0.5)
IMM GRANULOCYTES NFR BLD: 0.8 % (ref 0–0.5)
IMM GRANULOCYTES NFR BLD: 0.8 % (ref 0–0.5)
INR PPP: 0.98 (ref 0.9–1.1)
INR PPP: 1.19 (ref 0.9–1.1)
KETONES UR QL STRIP: NEGATIVE
KETONES UR QL STRIP: NEGATIVE
L PNEUMO1 AG UR QL IA: NEGATIVE
LDLC SERPL CALC-MCNC: 78 MG/DL (ref 0–100)
LDLC/HDLC SERPL: 2.35 {RATIO}
LEFT ATRIUM VOLUME INDEX: 28 ML/M2
LEUKOCYTE ESTERASE UR QL STRIP.AUTO: NEGATIVE
LEUKOCYTE ESTERASE UR QL STRIP.AUTO: NEGATIVE
LYMPHOCYTES # BLD AUTO: 0.35 10*3/MM3 (ref 0.9–4.8)
LYMPHOCYTES # BLD AUTO: 0.96 10*3/MM3 (ref 0.9–4.8)
LYMPHOCYTES # BLD AUTO: 1.14 10*3/MM3 (ref 0.9–4.8)
LYMPHOCYTES # BLD AUTO: 1.16 10*3/MM3 (ref 0.9–4.8)
LYMPHOCYTES # BLD AUTO: 1.25 10*3/MM3 (ref 0.9–4.8)
LYMPHOCYTES # BLD AUTO: 1.34 10*3/MM3 (ref 0.9–4.8)
LYMPHOCYTES # BLD AUTO: 1.36 10*3/MM3 (ref 0.9–4.8)
LYMPHOCYTES # BLD AUTO: 1.51 10*3/MM3 (ref 0.9–4.8)
LYMPHOCYTES # BLD AUTO: 1.53 10*3/MM3 (ref 0.9–4.8)
LYMPHOCYTES # BLD AUTO: 1.63 10*3/MM3 (ref 0.9–4.8)
LYMPHOCYTES # BLD AUTO: 1.69 10*3/MM3 (ref 0.9–4.8)
LYMPHOCYTES NFR BLD AUTO: 12.6 % (ref 19.6–45.3)
LYMPHOCYTES NFR BLD AUTO: 14.2 % (ref 19.6–45.3)
LYMPHOCYTES NFR BLD AUTO: 14.2 % (ref 19.6–45.3)
LYMPHOCYTES NFR BLD AUTO: 15.9 % (ref 19.6–45.3)
LYMPHOCYTES NFR BLD AUTO: 16.9 % (ref 19.6–45.3)
LYMPHOCYTES NFR BLD AUTO: 18.5 % (ref 19.6–45.3)
LYMPHOCYTES NFR BLD AUTO: 19.9 % (ref 19.6–45.3)
LYMPHOCYTES NFR BLD AUTO: 20.3 % (ref 19.6–45.3)
LYMPHOCYTES NFR BLD AUTO: 20.5 % (ref 19.6–45.3)
LYMPHOCYTES NFR BLD AUTO: 20.9 % (ref 19.6–45.3)
LYMPHOCYTES NFR BLD AUTO: 4.3 % (ref 19.6–45.3)
M PNEUMO IGG SER IA-ACNC: NOT DETECTED
M PNEUMO IGG SER IA-ACNC: NOT DETECTED
MAGNESIUM SERPL-MCNC: 2.1 MG/DL (ref 1.6–2.4)
MAGNESIUM SERPL-MCNC: 2.2 MG/DL (ref 1.6–2.4)
MCH RBC QN AUTO: 28 PG (ref 26.9–32)
MCH RBC QN AUTO: 28.2 PG (ref 26.9–32)
MCH RBC QN AUTO: 28.3 PG (ref 26.9–32)
MCH RBC QN AUTO: 28.4 PG (ref 26.9–32)
MCH RBC QN AUTO: 28.4 PG (ref 26.9–32)
MCH RBC QN AUTO: 28.6 PG (ref 26.9–32)
MCH RBC QN AUTO: 28.6 PG (ref 26.9–32)
MCH RBC QN AUTO: 28.7 PG (ref 26.9–32)
MCH RBC QN AUTO: 28.8 PG (ref 26.9–32)
MCH RBC QN AUTO: 28.8 PG (ref 26.9–32)
MCH RBC QN AUTO: 29 PG (ref 26.9–32)
MCHC RBC AUTO-ENTMCNC: 28 G/DL (ref 32.4–36.3)
MCHC RBC AUTO-ENTMCNC: 28.2 G/DL (ref 32.4–36.3)
MCHC RBC AUTO-ENTMCNC: 28.2 G/DL (ref 32.4–36.3)
MCHC RBC AUTO-ENTMCNC: 28.3 G/DL (ref 32.4–36.3)
MCHC RBC AUTO-ENTMCNC: 28.3 G/DL (ref 32.4–36.3)
MCHC RBC AUTO-ENTMCNC: 28.4 G/DL (ref 32.4–36.3)
MCHC RBC AUTO-ENTMCNC: 28.4 G/DL (ref 32.4–36.3)
MCHC RBC AUTO-ENTMCNC: 28.5 G/DL (ref 32.4–36.3)
MCHC RBC AUTO-ENTMCNC: 28.6 G/DL (ref 32.4–36.3)
MCHC RBC AUTO-ENTMCNC: 28.7 G/DL (ref 32.4–36.3)
MCHC RBC AUTO-ENTMCNC: 28.9 G/DL (ref 32.4–36.3)
MCHC RBC AUTO-ENTMCNC: 28.9 G/DL (ref 32.4–36.3)
MCHC RBC AUTO-ENTMCNC: 29.3 G/DL (ref 32.4–36.3)
MCV RBC AUTO: 100 FL (ref 80.5–98.2)
MCV RBC AUTO: 100 FL (ref 80.5–98.2)
MCV RBC AUTO: 100.5 FL (ref 80.5–98.2)
MCV RBC AUTO: 100.6 FL (ref 80.5–98.2)
MCV RBC AUTO: 100.6 FL (ref 80.5–98.2)
MCV RBC AUTO: 100.9 FL (ref 80.5–98.2)
MCV RBC AUTO: 101.4 FL (ref 80.5–98.2)
MCV RBC AUTO: 101.5 FL (ref 80.5–98.2)
MCV RBC AUTO: 102.1 FL (ref 80.5–98.2)
MCV RBC AUTO: 95.5 FL (ref 80.5–98.2)
MCV RBC AUTO: 99.4 FL (ref 80.5–98.2)
MCV RBC AUTO: 99.5 FL (ref 80.5–98.2)
MCV RBC AUTO: 99.5 FL (ref 80.5–98.2)
MODALITY: ABNORMAL
MONOCYTES # BLD AUTO: 0.24 10*3/MM3 (ref 0.2–1.2)
MONOCYTES # BLD AUTO: 0.4 10*3/MM3 (ref 0.2–1.2)
MONOCYTES # BLD AUTO: 0.58 10*3/MM3 (ref 0.2–1.2)
MONOCYTES # BLD AUTO: 0.61 10*3/MM3 (ref 0.2–1.2)
MONOCYTES # BLD AUTO: 0.66 10*3/MM3 (ref 0.2–1.2)
MONOCYTES # BLD AUTO: 0.69 10*3/MM3 (ref 0.2–1.2)
MONOCYTES # BLD AUTO: 0.7 10*3/MM3 (ref 0.2–1.2)
MONOCYTES # BLD AUTO: 0.7 10*3/MM3 (ref 0.2–1.2)
MONOCYTES # BLD AUTO: 0.78 10*3/MM3 (ref 0.2–1.2)
MONOCYTES # BLD AUTO: 0.81 10*3/MM3 (ref 0.2–1.2)
MONOCYTES # BLD AUTO: 0.86 10*3/MM3 (ref 0.2–1.2)
MONOCYTES NFR BLD AUTO: 10.1 % (ref 5–12)
MONOCYTES NFR BLD AUTO: 10.7 % (ref 5–12)
MONOCYTES NFR BLD AUTO: 2.9 % (ref 5–12)
MONOCYTES NFR BLD AUTO: 5.5 % (ref 5–12)
MONOCYTES NFR BLD AUTO: 6.9 % (ref 5–12)
MONOCYTES NFR BLD AUTO: 8 % (ref 5–12)
MONOCYTES NFR BLD AUTO: 8.7 % (ref 5–12)
MONOCYTES NFR BLD AUTO: 8.7 % (ref 5–12)
MONOCYTES NFR BLD AUTO: 9.1 % (ref 5–12)
MONOCYTES NFR BLD AUTO: 9.2 % (ref 5–12)
MONOCYTES NFR BLD AUTO: 9.8 % (ref 5–12)
NEUTROPHILS # BLD AUTO: 4.33 10*3/MM3 (ref 1.9–8.1)
NEUTROPHILS # BLD AUTO: 4.91 10*3/MM3 (ref 1.9–8.1)
NEUTROPHILS # BLD AUTO: 5.4 10*3/MM3 (ref 1.9–8.1)
NEUTROPHILS # BLD AUTO: 5.53 10*3/MM3 (ref 1.9–8.1)
NEUTROPHILS # BLD AUTO: 5.66 10*3/MM3 (ref 1.9–8.1)
NEUTROPHILS # BLD AUTO: 5.74 10*3/MM3 (ref 1.9–8.1)
NEUTROPHILS # BLD AUTO: 5.81 10*3/MM3 (ref 1.9–8.1)
NEUTROPHILS # BLD AUTO: 5.9 10*3/MM3 (ref 1.9–8.1)
NEUTROPHILS # BLD AUTO: 5.92 10*3/MM3 (ref 1.9–8.1)
NEUTROPHILS # BLD AUTO: 6.76 10*3/MM3 (ref 1.9–8.1)
NEUTROPHILS # BLD AUTO: 7.55 10*3/MM3 (ref 1.9–8.1)
NEUTROPHILS NFR BLD AUTO: 66 % (ref 42.7–76)
NEUTROPHILS NFR BLD AUTO: 66.5 % (ref 42.7–76)
NEUTROPHILS NFR BLD AUTO: 67.6 % (ref 42.7–76)
NEUTROPHILS NFR BLD AUTO: 67.9 % (ref 42.7–76)
NEUTROPHILS NFR BLD AUTO: 70.2 % (ref 42.7–76)
NEUTROPHILS NFR BLD AUTO: 71.2 % (ref 42.7–76)
NEUTROPHILS NFR BLD AUTO: 73.7 % (ref 42.7–76)
NEUTROPHILS NFR BLD AUTO: 75.9 % (ref 42.7–76)
NEUTROPHILS NFR BLD AUTO: 76.9 % (ref 42.7–76)
NEUTROPHILS NFR BLD AUTO: 77.7 % (ref 42.7–76)
NEUTROPHILS NFR BLD AUTO: 92.1 % (ref 42.7–76)
NITRITE UR QL STRIP: NEGATIVE
NITRITE UR QL STRIP: NEGATIVE
NRBC BLD MANUAL-RTO: 0 /100 WBC (ref 0–0)
NT-PROBNP SERPL-MCNC: 3196 PG/ML (ref 0–1800)
NT-PROBNP SERPL-MCNC: 4194 PG/ML (ref 0–1800)
NT-PROBNP SERPL-MCNC: 4472 PG/ML (ref 0–1800)
NT-PROBNP SERPL-MCNC: 5547 PG/ML (ref 0–1800)
NT-PROBNP SERPL-MCNC: 7726 PG/ML (ref 0–1800)
NT-PROBNP SERPL-MCNC: 7757 PG/ML (ref 0–1800)
PCO2 BLDA: 106.4 MM HG (ref 35–45)
PCO2 BLDA: 61.7 MM HG (ref 35–45)
PCO2 BLDA: 73.9 MM HG (ref 35–45)
PH BLDA: 7.23 PH UNITS (ref 7.35–7.45)
PH BLDA: 7.4 PH UNITS (ref 7.35–7.45)
PH BLDA: 7.46 PH UNITS (ref 7.35–7.45)
PH UR STRIP.AUTO: 5.5 [PH] (ref 5–8)
PH UR STRIP.AUTO: <=5 [PH] (ref 5–8)
PLATELET # BLD AUTO: 140 10*3/MM3 (ref 140–500)
PLATELET # BLD AUTO: 165 10*3/MM3 (ref 140–500)
PLATELET # BLD AUTO: 166 10*3/MM3 (ref 140–500)
PLATELET # BLD AUTO: 167 10*3/MM3 (ref 140–500)
PLATELET # BLD AUTO: 181 10*3/MM3 (ref 140–500)
PLATELET # BLD AUTO: 182 10*3/MM3 (ref 140–500)
PLATELET # BLD AUTO: 186 10*3/MM3 (ref 140–500)
PLATELET # BLD AUTO: 188 10*3/MM3 (ref 140–500)
PLATELET # BLD AUTO: 191 10*3/MM3 (ref 140–500)
PLATELET # BLD AUTO: 208 10*3/MM3 (ref 140–500)
PLATELET # BLD AUTO: 209 10*3/MM3 (ref 140–500)
PLATELET # BLD AUTO: 210 10*3/MM3 (ref 140–500)
PLATELET # BLD AUTO: 260 10*3/MM3 (ref 140–500)
PMV BLD AUTO: 10.3 FL (ref 6–12)
PMV BLD AUTO: 10.4 FL (ref 6–12)
PMV BLD AUTO: 10.4 FL (ref 6–12)
PMV BLD AUTO: 10.5 FL (ref 6–12)
PMV BLD AUTO: 10.6 FL (ref 6–12)
PMV BLD AUTO: 10.6 FL (ref 6–12)
PMV BLD AUTO: 10.8 FL (ref 6–12)
PMV BLD AUTO: 10.8 FL (ref 6–12)
PMV BLD AUTO: 10.9 FL (ref 6–12)
PMV BLD AUTO: 11.1 FL (ref 6–12)
PMV BLD AUTO: 11.3 FL (ref 6–12)
PMV BLD AUTO: 11.5 FL (ref 6–12)
PO2 BLDA: 139.2 MM HG (ref 80–100)
PO2 BLDA: 70.9 MM HG (ref 80–100)
PO2 BLDA: 76.7 MM HG (ref 80–100)
POTASSIUM BLD-SCNC: 2.9 MMOL/L (ref 3.5–5.2)
POTASSIUM BLD-SCNC: 3.6 MMOL/L (ref 3.5–5.2)
POTASSIUM BLD-SCNC: 3.7 MMOL/L (ref 3.5–5.2)
POTASSIUM BLD-SCNC: 3.8 MMOL/L (ref 3.5–5.2)
POTASSIUM BLD-SCNC: 3.9 MMOL/L (ref 3.5–5.2)
POTASSIUM BLD-SCNC: 4 MMOL/L (ref 3.5–5.2)
POTASSIUM BLD-SCNC: 4 MMOL/L (ref 3.5–5.2)
POTASSIUM BLD-SCNC: 4.1 MMOL/L (ref 3.5–5.2)
POTASSIUM BLD-SCNC: 4.2 MMOL/L (ref 3.5–5.2)
POTASSIUM BLD-SCNC: 4.3 MMOL/L (ref 3.5–5.2)
POTASSIUM BLD-SCNC: 4.3 MMOL/L (ref 3.5–5.2)
POTASSIUM BLD-SCNC: 4.4 MMOL/L (ref 3.5–5.2)
POTASSIUM BLD-SCNC: 4.4 MMOL/L (ref 3.5–5.2)
POTASSIUM SERPL-SCNC: 4.3 MMOL/L (ref 3.5–5.2)
PROCALCITONIN SERPL-MCNC: 0.05 NG/ML (ref 0.1–0.25)
PROCALCITONIN SERPL-MCNC: 0.06 NG/ML (ref 0.1–0.25)
PROT SERPL-MCNC: 7.1 G/DL (ref 6–8.5)
PROT SERPL-MCNC: 7.3 G/DL (ref 6–8.5)
PROT SERPL-MCNC: 7.4 G/DL (ref 6–8.5)
PROT SERPL-MCNC: 7.6 G/DL (ref 6–8.5)
PROT SERPL-MCNC: 7.8 G/DL (ref 6–8.5)
PROT UR QL STRIP: NEGATIVE
PROT UR QL STRIP: NEGATIVE
PROTHROMBIN TIME: 12.6 SECONDS (ref 11.7–14.2)
PROTHROMBIN TIME: 14.7 SECONDS (ref 11.7–14.2)
RBC # BLD AUTO: 3.18 10*6/MM3 (ref 3.9–5.2)
RBC # BLD AUTO: 3.32 10*6/MM3 (ref 3.9–5.2)
RBC # BLD AUTO: 3.34 10*6/MM3 (ref 3.9–5.2)
RBC # BLD AUTO: 3.38 10*6/MM3 (ref 3.9–5.2)
RBC # BLD AUTO: 3.42 10*6/MM3 (ref 3.9–5.2)
RBC # BLD AUTO: 3.44 10*6/MM3 (ref 3.9–5.2)
RBC # BLD AUTO: 3.51 10*6/MM3 (ref 3.9–5.2)
RBC # BLD AUTO: 3.54 10*6/MM3 (ref 3.9–5.2)
RBC # BLD AUTO: 3.56 10*6/MM3 (ref 3.9–5.2)
RBC # BLD AUTO: 3.63 10*6/MM3 (ref 3.9–5.2)
RBC # BLD AUTO: 3.74 10*6/MM3 (ref 3.9–5.2)
RBC # BLD AUTO: 3.83 10*6/MM3 (ref 3.9–5.2)
RBC # BLD AUTO: 4.01 10*6/MM3 (ref 3.9–5.2)
RHINOVIRUS RNA SPEC NAA+PROBE: NOT DETECTED
RHINOVIRUS RNA SPEC NAA+PROBE: NOT DETECTED
RSV RNA NPH QL NAA+NON-PROBE: NOT DETECTED
RSV RNA NPH QL NAA+NON-PROBE: NOT DETECTED
S PNEUM AG SPEC QL LA: NEGATIVE
SAO2 % BLDCOA: 94.1 % (ref 92–99)
SAO2 % BLDCOA: 94.3 % (ref 92–99)
SAO2 % BLDCOA: 98.3 % (ref 92–99)
SET MECH RESP RATE: 20
SODIUM BLD-SCNC: 143 MMOL/L (ref 136–145)
SODIUM BLD-SCNC: 143 MMOL/L (ref 136–145)
SODIUM BLD-SCNC: 144 MMOL/L (ref 136–145)
SODIUM BLD-SCNC: 145 MMOL/L (ref 136–145)
SODIUM BLD-SCNC: 146 MMOL/L (ref 136–145)
SODIUM BLD-SCNC: 146 MMOL/L (ref 136–145)
SODIUM BLD-SCNC: 147 MMOL/L (ref 136–145)
SODIUM BLD-SCNC: 148 MMOL/L (ref 136–145)
SODIUM BLD-SCNC: 149 MMOL/L (ref 136–145)
SODIUM BLD-SCNC: 150 MMOL/L (ref 136–145)
SODIUM BLD-SCNC: 154 MMOL/L (ref 136–145)
SODIUM SERPL-SCNC: 141 MMOL/L (ref 136–145)
SP GR UR STRIP: 1.01 (ref 1–1.03)
SP GR UR STRIP: 1.02 (ref 1–1.03)
T4 FREE SERPL-MCNC: 0.99 NG/DL (ref 0.93–1.7)
T4 FREE SERPL-MCNC: 1.02 NG/DL (ref 0.93–1.7)
TOTAL RATE: 22 BREATHS/MINUTE
TOTAL RATE: 30 BREATHS/MINUTE
TRIGL SERPL-MCNC: 87 MG/DL (ref 0–150)
TROPONIN T SERPL-MCNC: <0.01 NG/ML (ref 0–0.03)
TSH SERPL DL<=0.005 MIU/L-ACNC: 1.68 MIU/ML (ref 0.27–4.2)
TSH SERPL DL<=0.005 MIU/L-ACNC: 1.71 MIU/ML (ref 0.27–4.2)
TSH SERPL DL<=0.05 MIU/L-ACNC: 0.72 MIU/ML (ref 0.27–4.2)
URATE SERPL-MCNC: 10.2 MG/DL (ref 2.4–5.7)
UROBILINOGEN UR QL STRIP: NORMAL
UROBILINOGEN UR QL STRIP: NORMAL
VLDLC SERPL-MCNC: 17.4 MG/DL (ref 5–40)
WBC # BLD AUTO: 8.5 10*3/MM3 (ref 4.5–10.7)
WBC NRBC COR # BLD: 6.55 10*3/MM3 (ref 4.5–10.7)
WBC NRBC COR # BLD: 7.23 10*3/MM3 (ref 4.5–10.7)
WBC NRBC COR # BLD: 7.29 10*3/MM3 (ref 4.5–10.7)
WBC NRBC COR # BLD: 7.6 10*3/MM3 (ref 4.5–10.7)
WBC NRBC COR # BLD: 8.01 10*3/MM3 (ref 4.5–10.7)
WBC NRBC COR # BLD: 8.02 10*3/MM3 (ref 4.5–10.7)
WBC NRBC COR # BLD: 8.04 10*3/MM3 (ref 4.5–10.7)
WBC NRBC COR # BLD: 8.06 10*3/MM3 (ref 4.5–10.7)
WBC NRBC COR # BLD: 8.2 10*3/MM3 (ref 4.5–10.7)
WBC NRBC COR # BLD: 8.27 10*3/MM3 (ref 4.5–10.7)
WBC NRBC COR # BLD: 8.46 10*3/MM3 (ref 4.5–10.7)
WBC NRBC COR # BLD: 8.8 10*3/MM3 (ref 4.5–10.7)
WHOLE BLOOD HOLD SPECIMEN: NORMAL
WHOLE BLOOD HOLD SPECIMEN: NORMAL

## 2017-01-01 PROCEDURE — 87798 DETECT AGENT NOS DNA AMP: CPT | Performed by: INTERNAL MEDICINE

## 2017-01-01 PROCEDURE — 25010000002 HEPARIN (PORCINE) PER 1000 UNITS: Performed by: INTERNAL MEDICINE

## 2017-01-01 PROCEDURE — 80061 LIPID PANEL: CPT | Performed by: INTERNAL MEDICINE

## 2017-01-01 PROCEDURE — 87040 BLOOD CULTURE FOR BACTERIA: CPT | Performed by: EMERGENCY MEDICINE

## 2017-01-01 PROCEDURE — 25010000002 ENOXAPARIN PER 10 MG: Performed by: INTERNAL MEDICINE

## 2017-01-01 PROCEDURE — 94799 UNLISTED PULMONARY SVC/PX: CPT

## 2017-01-01 PROCEDURE — 83735 ASSAY OF MAGNESIUM: CPT | Performed by: INTERNAL MEDICINE

## 2017-01-01 PROCEDURE — 84295 ASSAY OF SERUM SODIUM: CPT | Performed by: HOSPITALIST

## 2017-01-01 PROCEDURE — G0008 ADMIN INFLUENZA VIRUS VAC: HCPCS | Performed by: INTERNAL MEDICINE

## 2017-01-01 PROCEDURE — 80048 BASIC METABOLIC PNL TOTAL CA: CPT | Performed by: INTERNAL MEDICINE

## 2017-01-01 PROCEDURE — 80048 BASIC METABOLIC PNL TOTAL CA: CPT | Performed by: HOSPITALIST

## 2017-01-01 PROCEDURE — 85610 PROTHROMBIN TIME: CPT | Performed by: INTERNAL MEDICINE

## 2017-01-01 PROCEDURE — 97110 THERAPEUTIC EXERCISES: CPT

## 2017-01-01 PROCEDURE — 25010000002 CEFTRIAXONE PER 250 MG: Performed by: EMERGENCY MEDICINE

## 2017-01-01 PROCEDURE — 92526 ORAL FUNCTION THERAPY: CPT

## 2017-01-01 PROCEDURE — 85379 FIBRIN DEGRADATION QUANT: CPT | Performed by: INTERNAL MEDICINE

## 2017-01-01 PROCEDURE — 25010000002 FUROSEMIDE PER 20 MG: Performed by: INTERNAL MEDICINE

## 2017-01-01 PROCEDURE — 71010 HC CHEST PA OR AP: CPT

## 2017-01-01 PROCEDURE — 84484 ASSAY OF TROPONIN QUANT: CPT | Performed by: INTERNAL MEDICINE

## 2017-01-01 PROCEDURE — 81003 URINALYSIS AUTO W/O SCOPE: CPT | Performed by: INTERNAL MEDICINE

## 2017-01-01 PROCEDURE — 92611 MOTION FLUOROSCOPY/SWALLOW: CPT

## 2017-01-01 PROCEDURE — 83605 ASSAY OF LACTIC ACID: CPT | Performed by: EMERGENCY MEDICINE

## 2017-01-01 PROCEDURE — 84145 PROCALCITONIN (PCT): CPT | Performed by: EMERGENCY MEDICINE

## 2017-01-01 PROCEDURE — 80048 BASIC METABOLIC PNL TOTAL CA: CPT | Performed by: NURSE PRACTITIONER

## 2017-01-01 PROCEDURE — 85027 COMPLETE CBC AUTOMATED: CPT | Performed by: INTERNAL MEDICINE

## 2017-01-01 PROCEDURE — 84132 ASSAY OF SERUM POTASSIUM: CPT | Performed by: HOSPITALIST

## 2017-01-01 PROCEDURE — 25010000002 CEFTRIAXONE PER 250 MG: Performed by: INTERNAL MEDICINE

## 2017-01-01 PROCEDURE — 99232 SBSQ HOSP IP/OBS MODERATE 35: CPT | Performed by: INTERNAL MEDICINE

## 2017-01-01 PROCEDURE — 93005 ELECTROCARDIOGRAM TRACING: CPT | Performed by: INTERNAL MEDICINE

## 2017-01-01 PROCEDURE — 80053 COMPREHEN METABOLIC PANEL: CPT | Performed by: INTERNAL MEDICINE

## 2017-01-01 PROCEDURE — 80162 ASSAY OF DIGOXIN TOTAL: CPT | Performed by: INTERNAL MEDICINE

## 2017-01-01 PROCEDURE — 85025 COMPLETE CBC W/AUTO DIFF WBC: CPT | Performed by: HOSPITALIST

## 2017-01-01 PROCEDURE — 25010000002 HYDROMORPHONE PER 4 MG: Performed by: INTERNAL MEDICINE

## 2017-01-01 PROCEDURE — 83880 ASSAY OF NATRIURETIC PEPTIDE: CPT | Performed by: INTERNAL MEDICINE

## 2017-01-01 PROCEDURE — 87581 M.PNEUMON DNA AMP PROBE: CPT | Performed by: INTERNAL MEDICINE

## 2017-01-01 PROCEDURE — 85025 COMPLETE CBC W/AUTO DIFF WBC: CPT | Performed by: INTERNAL MEDICINE

## 2017-01-01 PROCEDURE — 87899 AGENT NOS ASSAY W/OPTIC: CPT | Performed by: INTERNAL MEDICINE

## 2017-01-01 PROCEDURE — 63710000001 PREDNISONE PER 1 MG: Performed by: INTERNAL MEDICINE

## 2017-01-01 PROCEDURE — 25010000002 ONDANSETRON PER 1 MG: Performed by: INTERNAL MEDICINE

## 2017-01-01 PROCEDURE — 93306 TTE W/DOPPLER COMPLETE: CPT | Performed by: INTERNAL MEDICINE

## 2017-01-01 PROCEDURE — 82803 BLOOD GASES ANY COMBINATION: CPT

## 2017-01-01 PROCEDURE — 80162 ASSAY OF DIGOXIN TOTAL: CPT | Performed by: EMERGENCY MEDICINE

## 2017-01-01 PROCEDURE — 25010000002 DIGOXIN PER 500 MCG: Performed by: INTERNAL MEDICINE

## 2017-01-01 PROCEDURE — 25010000002 METHYLPREDNISOLONE PER 125 MG: Performed by: INTERNAL MEDICINE

## 2017-01-01 PROCEDURE — 83880 ASSAY OF NATRIURETIC PEPTIDE: CPT | Performed by: HOSPITALIST

## 2017-01-01 PROCEDURE — 94760 N-INVAS EAR/PLS OXIMETRY 1: CPT

## 2017-01-01 PROCEDURE — G8997 SWALLOW GOAL STATUS: HCPCS

## 2017-01-01 PROCEDURE — 84443 ASSAY THYROID STIM HORMONE: CPT | Performed by: INTERNAL MEDICINE

## 2017-01-01 PROCEDURE — 99233 SBSQ HOSP IP/OBS HIGH 50: CPT | Performed by: INTERNAL MEDICINE

## 2017-01-01 PROCEDURE — 82962 GLUCOSE BLOOD TEST: CPT

## 2017-01-01 PROCEDURE — 97162 PT EVAL MOD COMPLEX 30 MIN: CPT

## 2017-01-01 PROCEDURE — 99222 1ST HOSP IP/OBS MODERATE 55: CPT | Performed by: INTERNAL MEDICINE

## 2017-01-01 PROCEDURE — 80053 COMPREHEN METABOLIC PANEL: CPT | Performed by: EMERGENCY MEDICINE

## 2017-01-01 PROCEDURE — 85025 COMPLETE CBC W/AUTO DIFF WBC: CPT | Performed by: EMERGENCY MEDICINE

## 2017-01-01 PROCEDURE — 87633 RESP VIRUS 12-25 TARGETS: CPT | Performed by: INTERNAL MEDICINE

## 2017-01-01 PROCEDURE — 74230 X-RAY XM SWLNG FUNCJ C+: CPT

## 2017-01-01 PROCEDURE — 85610 PROTHROMBIN TIME: CPT | Performed by: HOSPITALIST

## 2017-01-01 PROCEDURE — 92610 EVALUATE SWALLOWING FUNCTION: CPT

## 2017-01-01 PROCEDURE — 93005 ELECTROCARDIOGRAM TRACING: CPT

## 2017-01-01 PROCEDURE — 71020 HC CHEST PA AND LATERAL: CPT

## 2017-01-01 PROCEDURE — 87486 CHLMYD PNEUM DNA AMP PROBE: CPT | Performed by: INTERNAL MEDICINE

## 2017-01-01 PROCEDURE — G0438 PPPS, INITIAL VISIT: HCPCS | Performed by: INTERNAL MEDICINE

## 2017-01-01 PROCEDURE — 93010 ELECTROCARDIOGRAM REPORT: CPT | Performed by: INTERNAL MEDICINE

## 2017-01-01 PROCEDURE — 93970 EXTREMITY STUDY: CPT

## 2017-01-01 PROCEDURE — 84550 ASSAY OF BLOOD/URIC ACID: CPT | Performed by: INTERNAL MEDICINE

## 2017-01-01 PROCEDURE — 0 IOPAMIDOL PER 1 ML: Performed by: INTERNAL MEDICINE

## 2017-01-01 PROCEDURE — 83880 ASSAY OF NATRIURETIC PEPTIDE: CPT | Performed by: EMERGENCY MEDICINE

## 2017-01-01 PROCEDURE — 36600 WITHDRAWAL OF ARTERIAL BLOOD: CPT

## 2017-01-01 PROCEDURE — 99214 OFFICE O/P EST MOD 30 MIN: CPT | Performed by: INTERNAL MEDICINE

## 2017-01-01 PROCEDURE — 93005 ELECTROCARDIOGRAM TRACING: CPT | Performed by: EMERGENCY MEDICINE

## 2017-01-01 PROCEDURE — 93306 TTE W/DOPPLER COMPLETE: CPT

## 2017-01-01 PROCEDURE — 83036 HEMOGLOBIN GLYCOSYLATED A1C: CPT | Performed by: INTERNAL MEDICINE

## 2017-01-01 PROCEDURE — 94640 AIRWAY INHALATION TREATMENT: CPT

## 2017-01-01 PROCEDURE — 76942 ECHO GUIDE FOR BIOPSY: CPT

## 2017-01-01 PROCEDURE — G8998 SWALLOW D/C STATUS: HCPCS

## 2017-01-01 PROCEDURE — 90661 CCIIV3 VAC ABX FR 0.5 ML IM: CPT | Performed by: INTERNAL MEDICINE

## 2017-01-01 PROCEDURE — 81003 URINALYSIS AUTO W/O SCOPE: CPT | Performed by: EMERGENCY MEDICINE

## 2017-01-01 PROCEDURE — 25010000002 FUROSEMIDE PER 20 MG: Performed by: EMERGENCY MEDICINE

## 2017-01-01 PROCEDURE — 99221 1ST HOSP IP/OBS SF/LOW 40: CPT | Performed by: INTERNAL MEDICINE

## 2017-01-01 PROCEDURE — 99285 EMERGENCY DEPT VISIT HI MDM: CPT

## 2017-01-01 PROCEDURE — 84484 ASSAY OF TROPONIN QUANT: CPT | Performed by: EMERGENCY MEDICINE

## 2017-01-01 PROCEDURE — 0W993ZZ DRAINAGE OF RIGHT PLEURAL CAVITY, PERCUTANEOUS APPROACH: ICD-10-PCS | Performed by: RADIOLOGY

## 2017-01-01 PROCEDURE — G8996 SWALLOW CURRENT STATUS: HCPCS

## 2017-01-01 PROCEDURE — 76705 ECHO EXAM OF ABDOMEN: CPT

## 2017-01-01 PROCEDURE — 25010000002 INFLUENZA VAC SUBUNIT QUAD 0.5 ML SUSPENSION PREFILLED SYRINGE: Performed by: INTERNAL MEDICINE

## 2017-01-01 PROCEDURE — 63710000001 PREDNISONE PER 1 MG: Performed by: HOSPITALIST

## 2017-01-01 PROCEDURE — 71275 CT ANGIOGRAPHY CHEST: CPT

## 2017-01-01 PROCEDURE — 85730 THROMBOPLASTIN TIME PARTIAL: CPT | Performed by: HOSPITALIST

## 2017-01-01 RX ORDER — SODIUM CHLORIDE 0.9 % (FLUSH) 0.9 %
10 SYRINGE (ML) INJECTION AS NEEDED
Status: DISCONTINUED | OUTPATIENT
Start: 2017-01-01 | End: 2017-01-01

## 2017-01-01 RX ORDER — ASPIRIN 325 MG
325 TABLET, DELAYED RELEASE (ENTERIC COATED) ORAL DAILY
Status: DISCONTINUED | OUTPATIENT
Start: 2017-01-01 | End: 2017-01-01 | Stop reason: HOSPADM

## 2017-01-01 RX ORDER — ALBUTEROL SULFATE 2.5 MG/3ML
2.5 SOLUTION RESPIRATORY (INHALATION) EVERY 4 HOURS PRN
Status: DISCONTINUED | OUTPATIENT
Start: 2017-01-01 | End: 2017-01-01

## 2017-01-01 RX ORDER — FUROSEMIDE 40 MG/1
40 TABLET ORAL DAILY
Status: DISCONTINUED | OUTPATIENT
Start: 2017-01-01 | End: 2017-01-01 | Stop reason: HOSPADM

## 2017-01-01 RX ORDER — ASPIRIN 325 MG
325 TABLET, DELAYED RELEASE (ENTERIC COATED) ORAL DAILY
COMMUNITY

## 2017-01-01 RX ORDER — FUROSEMIDE 10 MG/ML
40 INJECTION INTRAMUSCULAR; INTRAVENOUS ONCE
Status: COMPLETED | OUTPATIENT
Start: 2017-01-01 | End: 2017-01-01

## 2017-01-01 RX ORDER — PREDNISONE 20 MG/1
40 TABLET ORAL
Status: DISCONTINUED | OUTPATIENT
Start: 2017-01-01 | End: 2017-01-01

## 2017-01-01 RX ORDER — PANTOPRAZOLE SODIUM 40 MG/1
40 TABLET, DELAYED RELEASE ORAL DAILY
COMMUNITY

## 2017-01-01 RX ORDER — POTASSIUM CHLORIDE 7.45 MG/ML
10 INJECTION INTRAVENOUS
Status: DISCONTINUED | OUTPATIENT
Start: 2017-01-01 | End: 2017-01-01

## 2017-01-01 RX ORDER — BUSPIRONE HYDROCHLORIDE 5 MG/1
7.5 TABLET ORAL 2 TIMES DAILY
Qty: 60 TABLET | Refills: 3
Start: 2017-01-01

## 2017-01-01 RX ORDER — IPRATROPIUM BROMIDE AND ALBUTEROL SULFATE 2.5; .5 MG/3ML; MG/3ML
3 SOLUTION RESPIRATORY (INHALATION)
Status: DISCONTINUED | OUTPATIENT
Start: 2017-01-01 | End: 2017-01-01

## 2017-01-01 RX ORDER — METOPROLOL SUCCINATE 100 MG/1
100 TABLET, EXTENDED RELEASE ORAL
Status: DISCONTINUED | OUTPATIENT
Start: 2017-01-01 | End: 2017-01-01

## 2017-01-01 RX ORDER — LORAZEPAM 2 MG/ML
0.5 CONCENTRATE ORAL
Status: DISCONTINUED | OUTPATIENT
Start: 2017-01-01 | End: 2017-01-01 | Stop reason: HOSPADM

## 2017-01-01 RX ORDER — ACETAMINOPHEN 325 MG/1
650 TABLET ORAL EVERY 4 HOURS PRN
Status: DISCONTINUED | OUTPATIENT
Start: 2017-01-01 | End: 2017-01-01

## 2017-01-01 RX ORDER — ACETAMINOPHEN 160 MG/5ML
650 SOLUTION ORAL EVERY 4 HOURS PRN
Status: DISCONTINUED | OUTPATIENT
Start: 2017-01-01 | End: 2017-01-01 | Stop reason: HOSPADM

## 2017-01-01 RX ORDER — PANTOPRAZOLE SODIUM 40 MG/1
TABLET, DELAYED RELEASE ORAL
Qty: 30 TABLET | Refills: 2 | Status: ON HOLD | OUTPATIENT
Start: 2017-01-01 | End: 2017-01-01

## 2017-01-01 RX ORDER — BUDESONIDE 0.5 MG/2ML
INHALANT ORAL
Qty: 120 ML | Refills: 1 | Status: SHIPPED | OUTPATIENT
Start: 2017-01-01

## 2017-01-01 RX ORDER — ONDANSETRON 4 MG/1
4 TABLET, ORALLY DISINTEGRATING ORAL EVERY 6 HOURS PRN
Status: DISCONTINUED | OUTPATIENT
Start: 2017-01-01 | End: 2017-01-01 | Stop reason: HOSPADM

## 2017-01-01 RX ORDER — DIGOXIN 125 MCG
125 TABLET ORAL
Status: DISCONTINUED | OUTPATIENT
Start: 2017-01-01 | End: 2017-01-01

## 2017-01-01 RX ORDER — BUSPIRONE HYDROCHLORIDE 5 MG/1
5 TABLET ORAL 2 TIMES DAILY
Qty: 60 TABLET | Refills: 3 | Status: SHIPPED | OUTPATIENT
Start: 2017-01-01 | End: 2017-01-01 | Stop reason: SDUPTHER

## 2017-01-01 RX ORDER — FUROSEMIDE 40 MG/1
TABLET ORAL
Qty: 30 TABLET | Refills: 2 | Status: SHIPPED | OUTPATIENT
Start: 2017-01-01

## 2017-01-01 RX ORDER — SENNA AND DOCUSATE SODIUM 50; 8.6 MG/1; MG/1
2 TABLET, FILM COATED ORAL NIGHTLY PRN
Start: 2017-01-01

## 2017-01-01 RX ORDER — PANTOPRAZOLE SODIUM 40 MG/1
40 TABLET, DELAYED RELEASE ORAL
Status: DISCONTINUED | OUTPATIENT
Start: 2017-01-01 | End: 2017-01-01

## 2017-01-01 RX ORDER — LORAZEPAM 2 MG/ML
2 CONCENTRATE ORAL
Status: DISCONTINUED | OUTPATIENT
Start: 2017-01-01 | End: 2017-01-01 | Stop reason: HOSPADM

## 2017-01-01 RX ORDER — PREDNISONE 20 MG/1
20 TABLET ORAL
Status: DISCONTINUED | OUTPATIENT
Start: 2017-01-01 | End: 2017-01-01

## 2017-01-01 RX ORDER — BUDESONIDE 0.5 MG/2ML
INHALANT ORAL
Qty: 120 ML | Refills: 1 | Status: SHIPPED | OUTPATIENT
Start: 2017-01-01 | End: 2017-01-01 | Stop reason: SDUPTHER

## 2017-01-01 RX ORDER — FUROSEMIDE 10 MG/ML
20 INJECTION INTRAMUSCULAR; INTRAVENOUS ONCE
Status: COMPLETED | OUTPATIENT
Start: 2017-01-01 | End: 2017-01-01

## 2017-01-01 RX ORDER — NYSTATIN 100000 [USP'U]/G
POWDER TOPICAL EVERY 12 HOURS SCHEDULED
Status: DISCONTINUED | OUTPATIENT
Start: 2017-01-01 | End: 2017-01-01 | Stop reason: HOSPADM

## 2017-01-01 RX ORDER — METOPROLOL SUCCINATE 100 MG/1
TABLET, EXTENDED RELEASE ORAL
Qty: 30 TABLET | Refills: 2 | Status: ON HOLD | OUTPATIENT
Start: 2017-01-01 | End: 2017-01-01

## 2017-01-01 RX ORDER — ONDANSETRON 2 MG/ML
4 INJECTION INTRAMUSCULAR; INTRAVENOUS EVERY 6 HOURS PRN
Status: DISCONTINUED | OUTPATIENT
Start: 2017-01-01 | End: 2017-01-01 | Stop reason: HOSPADM

## 2017-01-01 RX ORDER — LORAZEPAM 2 MG/ML
1 INJECTION INTRAMUSCULAR
Status: DISCONTINUED | OUTPATIENT
Start: 2017-01-01 | End: 2017-01-01 | Stop reason: HOSPADM

## 2017-01-01 RX ORDER — AZITHROMYCIN 250 MG/1
500 TABLET, FILM COATED ORAL DAILY
Status: DISCONTINUED | OUTPATIENT
Start: 2017-01-01 | End: 2017-01-01 | Stop reason: HOSPADM

## 2017-01-01 RX ORDER — NITROGLYCERIN 0.4 MG/1
0.4 TABLET SUBLINGUAL
Status: DISCONTINUED | OUTPATIENT
Start: 2017-01-01 | End: 2017-01-01 | Stop reason: HOSPADM

## 2017-01-01 RX ORDER — ASPIRIN 81 MG/1
81 TABLET ORAL DAILY
Status: DISCONTINUED | OUTPATIENT
Start: 2017-01-01 | End: 2017-01-01

## 2017-01-01 RX ORDER — FLUTICASONE PROPIONATE 50 MCG
2 SPRAY, SUSPENSION (ML) NASAL DAILY
Status: DISCONTINUED | OUTPATIENT
Start: 2017-01-01 | End: 2017-01-01

## 2017-01-01 RX ORDER — ARFORMOTEROL TARTRATE 15 UG/2ML
SOLUTION RESPIRATORY (INHALATION)
Qty: 120 ML | Refills: 2 | Status: SHIPPED | OUTPATIENT
Start: 2017-01-01 | End: 2017-01-01 | Stop reason: SDUPTHER

## 2017-01-01 RX ORDER — METOPROLOL SUCCINATE 100 MG/1
200 TABLET, EXTENDED RELEASE ORAL DAILY
Start: 2017-01-01

## 2017-01-01 RX ORDER — ACETAMINOPHEN 325 MG/1
650 TABLET ORAL EVERY 6 HOURS PRN
Start: 2017-01-01

## 2017-01-01 RX ORDER — CEFTRIAXONE SODIUM 1 G/50ML
1 INJECTION, SOLUTION INTRAVENOUS ONCE
Status: COMPLETED | OUTPATIENT
Start: 2017-01-01 | End: 2017-01-01

## 2017-01-01 RX ORDER — FUROSEMIDE 10 MG/ML
40 INJECTION INTRAMUSCULAR; INTRAVENOUS EVERY 12 HOURS
Status: DISCONTINUED | OUTPATIENT
Start: 2017-01-01 | End: 2017-01-01

## 2017-01-01 RX ORDER — POTASSIUM CHLORIDE 1.5 G/1.77G
40 POWDER, FOR SOLUTION ORAL AS NEEDED
Status: DISCONTINUED | OUTPATIENT
Start: 2017-01-01 | End: 2017-01-01

## 2017-01-01 RX ORDER — ASPIRIN 325 MG
TABLET, DELAYED RELEASE (ENTERIC COATED) ORAL
Qty: 30 TABLET | Refills: 1 | Status: SHIPPED | OUTPATIENT
Start: 2017-01-01 | End: 2017-01-01 | Stop reason: SDUPTHER

## 2017-01-01 RX ORDER — DIGOXIN 125 MCG
125 TABLET ORAL
Qty: 30 TABLET | Refills: 0 | Status: SHIPPED | OUTPATIENT
Start: 2017-01-01

## 2017-01-01 RX ORDER — METOPROLOL SUCCINATE 100 MG/1
200 TABLET, EXTENDED RELEASE ORAL
Status: DISCONTINUED | OUTPATIENT
Start: 2017-01-01 | End: 2017-01-01 | Stop reason: HOSPADM

## 2017-01-01 RX ORDER — METOPROLOL SUCCINATE 100 MG/1
TABLET, EXTENDED RELEASE ORAL
Qty: 30 TABLET | Refills: 1 | Status: SHIPPED | OUTPATIENT
Start: 2017-01-01 | End: 2017-01-01 | Stop reason: SDUPTHER

## 2017-01-01 RX ORDER — FUROSEMIDE 40 MG/1
40 TABLET ORAL DAILY
Status: DISCONTINUED | OUTPATIENT
Start: 2017-01-01 | End: 2017-01-01

## 2017-01-01 RX ORDER — NYSTATIN 100000 [USP'U]/G
POWDER TOPICAL EVERY 12 HOURS SCHEDULED
Status: DISCONTINUED | OUTPATIENT
Start: 2017-01-01 | End: 2017-01-01

## 2017-01-01 RX ORDER — PANTOPRAZOLE SODIUM 40 MG/1
TABLET, DELAYED RELEASE ORAL
Status: DISPENSED
Start: 2017-01-01 | End: 2017-01-01

## 2017-01-01 RX ORDER — MULTIVITAMIN
TABLET ORAL
Qty: 30 TABLET | Refills: 1 | Status: SHIPPED | OUTPATIENT
Start: 2017-01-01 | End: 2017-01-01 | Stop reason: SDUPTHER

## 2017-01-01 RX ORDER — ACETAMINOPHEN 325 MG/1
650 TABLET ORAL EVERY 6 HOURS PRN
Status: DISCONTINUED | OUTPATIENT
Start: 2017-01-01 | End: 2017-01-01 | Stop reason: DRUGHIGH

## 2017-01-01 RX ORDER — SENNA AND DOCUSATE SODIUM 50; 8.6 MG/1; MG/1
2 TABLET, FILM COATED ORAL NIGHTLY PRN
Status: DISCONTINUED | OUTPATIENT
Start: 2017-01-01 | End: 2017-01-01

## 2017-01-01 RX ORDER — PANTOPRAZOLE SODIUM 40 MG/1
40 TABLET, DELAYED RELEASE ORAL EVERY MORNING
Status: DISCONTINUED | OUTPATIENT
Start: 2017-01-01 | End: 2017-01-01

## 2017-01-01 RX ORDER — SODIUM CHLORIDE 0.9 % (FLUSH) 0.9 %
1-10 SYRINGE (ML) INJECTION AS NEEDED
Status: DISCONTINUED | OUTPATIENT
Start: 2017-01-01 | End: 2017-01-01

## 2017-01-01 RX ORDER — GLYCOPYRROLATE 0.2 MG/ML
0.2 INJECTION INTRAMUSCULAR; INTRAVENOUS EVERY 4 HOURS PRN
Status: DISCONTINUED | OUTPATIENT
Start: 2017-01-01 | End: 2017-01-01 | Stop reason: HOSPADM

## 2017-01-01 RX ORDER — BUDESONIDE 0.5 MG/2ML
INHALANT ORAL
Status: DISPENSED
Start: 2017-01-01 | End: 2017-01-01

## 2017-01-01 RX ORDER — METOPROLOL SUCCINATE 100 MG/1
200 TABLET, EXTENDED RELEASE ORAL DAILY
Status: DISCONTINUED | OUTPATIENT
Start: 2017-01-01 | End: 2017-01-01

## 2017-01-01 RX ORDER — ARFORMOTEROL TARTRATE 15 UG/2ML
SOLUTION RESPIRATORY (INHALATION)
Qty: 120 ML | Refills: 1 | Status: SHIPPED | OUTPATIENT
Start: 2017-01-01 | End: 2017-01-01 | Stop reason: SDUPTHER

## 2017-01-01 RX ORDER — ECHINACEA PURPUREA EXTRACT 125 MG
1 TABLET ORAL AS NEEDED
Status: DISCONTINUED | OUTPATIENT
Start: 2017-01-01 | End: 2017-01-01

## 2017-01-01 RX ORDER — FUROSEMIDE 40 MG/1
TABLET ORAL
Qty: 30 TABLET | Refills: 1 | Status: SHIPPED | OUTPATIENT
Start: 2017-01-01 | End: 2017-01-01 | Stop reason: SDUPTHER

## 2017-01-01 RX ORDER — L.ACID,PARA/B.BIFIDUM/S.THERM 8B CELL
1 CAPSULE ORAL DAILY
Status: DISCONTINUED | OUTPATIENT
Start: 2017-01-01 | End: 2017-01-01 | Stop reason: HOSPADM

## 2017-01-01 RX ORDER — ECHINACEA PURPUREA EXTRACT 125 MG
2 TABLET ORAL AS NEEDED
Status: DISCONTINUED | OUTPATIENT
Start: 2017-01-01 | End: 2017-01-01

## 2017-01-01 RX ORDER — LORAZEPAM 1 MG/1
2 TABLET ORAL
Status: DISCONTINUED | OUTPATIENT
Start: 2017-01-01 | End: 2017-01-01 | Stop reason: HOSPADM

## 2017-01-01 RX ORDER — LORAZEPAM 2 MG/ML
0.5 INJECTION INTRAMUSCULAR
Status: DISCONTINUED | OUTPATIENT
Start: 2017-01-01 | End: 2017-01-01 | Stop reason: HOSPADM

## 2017-01-01 RX ORDER — ACETAMINOPHEN 325 MG/1
650 TABLET ORAL EVERY 4 HOURS PRN
Status: DISCONTINUED | OUTPATIENT
Start: 2017-01-01 | End: 2017-01-01 | Stop reason: HOSPADM

## 2017-01-01 RX ORDER — ARFORMOTEROL TARTRATE 15 UG/2ML
15 SOLUTION RESPIRATORY (INHALATION)
Status: DISCONTINUED | OUTPATIENT
Start: 2017-01-01 | End: 2017-01-01 | Stop reason: HOSPADM

## 2017-01-01 RX ORDER — PREDNISONE 20 MG/1
20 TABLET ORAL
Status: COMPLETED | OUTPATIENT
Start: 2017-01-01 | End: 2017-01-01

## 2017-01-01 RX ORDER — SODIUM CHLORIDE 0.9 % (FLUSH) 0.9 %
10 SYRINGE (ML) INJECTION AS NEEDED
Status: DISCONTINUED | OUTPATIENT
Start: 2017-01-01 | End: 2017-01-01 | Stop reason: HOSPADM

## 2017-01-01 RX ORDER — DIGOXIN 0.25 MG/ML
500 INJECTION INTRAMUSCULAR; INTRAVENOUS ONCE
Status: COMPLETED | OUTPATIENT
Start: 2017-01-01 | End: 2017-01-01

## 2017-01-01 RX ORDER — LORATADINE 10 MG/1
1 CAPSULE, LIQUID FILLED ORAL DAILY
COMMUNITY

## 2017-01-01 RX ORDER — METHYLPREDNISOLONE SODIUM SUCCINATE 125 MG/2ML
80 INJECTION, POWDER, LYOPHILIZED, FOR SOLUTION INTRAMUSCULAR; INTRAVENOUS EVERY 8 HOURS
Status: DISCONTINUED | OUTPATIENT
Start: 2017-01-01 | End: 2017-01-01

## 2017-01-01 RX ORDER — LORAZEPAM 0.5 MG/1
0.25 TABLET ORAL DAILY
Status: DISCONTINUED | OUTPATIENT
Start: 2017-01-01 | End: 2017-01-01 | Stop reason: HOSPADM

## 2017-01-01 RX ORDER — ALBUTEROL SULFATE 2.5 MG/3ML
2.5 SOLUTION RESPIRATORY (INHALATION) EVERY 6 HOURS PRN
Status: DISCONTINUED | OUTPATIENT
Start: 2017-01-01 | End: 2017-01-01 | Stop reason: HOSPADM

## 2017-01-01 RX ORDER — ACETAMINOPHEN 500 MG
1000 TABLET ORAL ONCE
Status: COMPLETED | OUTPATIENT
Start: 2017-01-01 | End: 2017-01-01

## 2017-01-01 RX ORDER — ECHINACEA PURPUREA EXTRACT 125 MG
1 TABLET ORAL AS NEEDED
Refills: 12
Start: 2017-01-01

## 2017-01-01 RX ORDER — DIPHENOXYLATE HYDROCHLORIDE AND ATROPINE SULFATE 2.5; .025 MG/1; MG/1
1 TABLET ORAL
Status: DISCONTINUED | OUTPATIENT
Start: 2017-01-01 | End: 2017-01-01 | Stop reason: HOSPADM

## 2017-01-01 RX ORDER — FUROSEMIDE 20 MG/1
20 TABLET ORAL DAILY
Status: DISCONTINUED | OUTPATIENT
Start: 2017-01-01 | End: 2017-01-01

## 2017-01-01 RX ORDER — CEFPODOXIME PROXETIL 200 MG/1
200 TABLET, FILM COATED ORAL EVERY 12 HOURS
Qty: 4 TABLET | Refills: 0 | Status: SHIPPED | OUTPATIENT
Start: 2017-01-01

## 2017-01-01 RX ORDER — LORAZEPAM 2 MG/ML
2 INJECTION INTRAMUSCULAR
Status: DISCONTINUED | OUTPATIENT
Start: 2017-01-01 | End: 2017-01-01 | Stop reason: HOSPADM

## 2017-01-01 RX ORDER — SCOLOPAMINE TRANSDERMAL SYSTEM 1 MG/1
1 PATCH, EXTENDED RELEASE TRANSDERMAL
Status: DISCONTINUED | OUTPATIENT
Start: 2017-01-01 | End: 2017-01-01 | Stop reason: HOSPADM

## 2017-01-01 RX ORDER — BUSPIRONE HYDROCHLORIDE 5 MG/1
5 TABLET ORAL 2 TIMES DAILY
Status: DISCONTINUED | OUTPATIENT
Start: 2017-01-01 | End: 2017-01-01

## 2017-01-01 RX ORDER — METOPROLOL SUCCINATE 50 MG/1
50 TABLET, EXTENDED RELEASE ORAL ONCE
Status: COMPLETED | OUTPATIENT
Start: 2017-01-01 | End: 2017-01-01

## 2017-01-01 RX ORDER — ARFORMOTEROL TARTRATE 15 UG/2ML
SOLUTION RESPIRATORY (INHALATION)
Qty: 60 ML | Refills: 0 | Status: SHIPPED | OUTPATIENT
Start: 2017-01-01

## 2017-01-01 RX ORDER — ONDANSETRON 2 MG/ML
4 INJECTION INTRAMUSCULAR; INTRAVENOUS EVERY 6 HOURS PRN
Status: DISCONTINUED | OUTPATIENT
Start: 2017-01-01 | End: 2017-01-01

## 2017-01-01 RX ORDER — LORAZEPAM 0.5 MG/1
0.5 TABLET ORAL
Status: DISCONTINUED | OUTPATIENT
Start: 2017-01-01 | End: 2017-01-01 | Stop reason: HOSPADM

## 2017-01-01 RX ORDER — IPRATROPIUM BROMIDE AND ALBUTEROL SULFATE 2.5; .5 MG/3ML; MG/3ML
SOLUTION RESPIRATORY (INHALATION)
Status: DISPENSED
Start: 2017-01-01 | End: 2017-01-01

## 2017-01-01 RX ORDER — BUSPIRONE HYDROCHLORIDE 15 MG/1
7.5 TABLET ORAL 2 TIMES DAILY
Status: DISCONTINUED | OUTPATIENT
Start: 2017-01-01 | End: 2017-01-01

## 2017-01-01 RX ORDER — FUROSEMIDE 10 MG/ML
20 INJECTION INTRAMUSCULAR; INTRAVENOUS EVERY 12 HOURS
Status: DISCONTINUED | OUTPATIENT
Start: 2017-01-01 | End: 2017-01-01

## 2017-01-01 RX ORDER — LORAZEPAM 0.5 MG/1
0.25 TABLET ORAL DAILY PRN
Qty: 4 TABLET | Refills: 0 | Status: SHIPPED | OUTPATIENT
Start: 2017-01-01

## 2017-01-01 RX ORDER — ECHINACEA PURPUREA EXTRACT 125 MG
1 TABLET ORAL AS NEEDED
Status: DISCONTINUED | OUTPATIENT
Start: 2017-01-01 | End: 2017-01-01 | Stop reason: HOSPADM

## 2017-01-01 RX ORDER — LORAZEPAM 2 MG/ML
1 CONCENTRATE ORAL
Status: DISCONTINUED | OUTPATIENT
Start: 2017-01-01 | End: 2017-01-01 | Stop reason: HOSPADM

## 2017-01-01 RX ORDER — POTASSIUM CHLORIDE 750 MG/1
40 CAPSULE, EXTENDED RELEASE ORAL ONCE
Status: COMPLETED | OUTPATIENT
Start: 2017-01-01 | End: 2017-01-01

## 2017-01-01 RX ORDER — BUSPIRONE HYDROCHLORIDE 5 MG/1
5 TABLET ORAL 3 TIMES DAILY
Qty: 60 TABLET | Refills: 3 | Status: SHIPPED | OUTPATIENT
Start: 2017-01-01 | End: 2017-01-01 | Stop reason: SDUPTHER

## 2017-01-01 RX ORDER — CETIRIZINE HYDROCHLORIDE 10 MG/1
5 TABLET ORAL NIGHTLY
Status: DISCONTINUED | OUTPATIENT
Start: 2017-01-01 | End: 2017-01-01 | Stop reason: HOSPADM

## 2017-01-01 RX ORDER — ALBUTEROL SULFATE 90 UG/1
2 AEROSOL, METERED RESPIRATORY (INHALATION) EVERY 4 HOURS PRN
Status: DISCONTINUED | OUTPATIENT
Start: 2017-01-01 | End: 2017-01-01 | Stop reason: CLARIF

## 2017-01-01 RX ORDER — BUDESONIDE 0.5 MG/2ML
0.5 INHALANT ORAL
Status: DISCONTINUED | OUTPATIENT
Start: 2017-01-01 | End: 2017-01-01 | Stop reason: HOSPADM

## 2017-01-01 RX ORDER — LORAZEPAM 1 MG/1
1 TABLET ORAL
Status: DISCONTINUED | OUTPATIENT
Start: 2017-01-01 | End: 2017-01-01 | Stop reason: HOSPADM

## 2017-01-01 RX ORDER — OXYMETAZOLINE HYDROCHLORIDE 0.05 G/100ML
2 SPRAY NASAL 2 TIMES DAILY
Status: DISCONTINUED | OUTPATIENT
Start: 2017-01-01 | End: 2017-01-01

## 2017-01-01 RX ORDER — ARFORMOTEROL TARTRATE 15 UG/2ML
15 SOLUTION RESPIRATORY (INHALATION)
Status: DISCONTINUED | OUTPATIENT
Start: 2017-01-01 | End: 2017-01-01

## 2017-01-01 RX ORDER — PANTOPRAZOLE SODIUM 40 MG/1
TABLET, DELAYED RELEASE ORAL
Qty: 30 TABLET | Refills: 1 | Status: SHIPPED | OUTPATIENT
Start: 2017-01-01 | End: 2017-01-01 | Stop reason: SDUPTHER

## 2017-01-01 RX ORDER — ACETAMINOPHEN 500 MG
TABLET ORAL
Status: COMPLETED
Start: 2017-01-01 | End: 2017-01-01

## 2017-01-01 RX ORDER — SENNA AND DOCUSATE SODIUM 50; 8.6 MG/1; MG/1
2 TABLET, FILM COATED ORAL NIGHTLY PRN
Status: DISCONTINUED | OUTPATIENT
Start: 2017-01-01 | End: 2017-01-01 | Stop reason: HOSPADM

## 2017-01-01 RX ORDER — BUDESONIDE 0.5 MG/2ML
INHALANT ORAL
Qty: 120 ML | Refills: 2 | Status: SHIPPED | OUTPATIENT
Start: 2017-01-01 | End: 2017-01-01 | Stop reason: SDUPTHER

## 2017-01-01 RX ORDER — BUSPIRONE HYDROCHLORIDE 5 MG/1
5 TABLET ORAL 2 TIMES DAILY
Qty: 60 TABLET | Refills: 3 | Status: ON HOLD | OUTPATIENT
Start: 2017-01-01 | End: 2017-01-01

## 2017-01-01 RX ORDER — BUDESONIDE 0.5 MG/2ML
0.5 INHALANT ORAL
Status: DISCONTINUED | OUTPATIENT
Start: 2017-01-01 | End: 2017-01-01

## 2017-01-01 RX ORDER — NYSTATIN 100000 [USP'U]/G
POWDER TOPICAL EVERY 12 HOURS SCHEDULED
Qty: 45 G | Refills: 0 | Status: SHIPPED | OUTPATIENT
Start: 2017-01-01

## 2017-01-01 RX ORDER — LORAZEPAM 0.5 MG/1
0.25 TABLET ORAL DAILY PRN
Status: DISCONTINUED | OUTPATIENT
Start: 2017-01-01 | End: 2017-01-01

## 2017-01-01 RX ORDER — HYDROCODONE BITARTRATE AND ACETAMINOPHEN 5; 325 MG/1; MG/1
1 TABLET ORAL EVERY 4 HOURS PRN
Status: DISCONTINUED | OUTPATIENT
Start: 2017-01-01 | End: 2017-01-01

## 2017-01-01 RX ORDER — BUSPIRONE HYDROCHLORIDE 15 MG/1
7.5 TABLET ORAL 2 TIMES DAILY
Status: DISCONTINUED | OUTPATIENT
Start: 2017-01-01 | End: 2017-01-01 | Stop reason: HOSPADM

## 2017-01-01 RX ORDER — CEFTRIAXONE SODIUM 1 G/50ML
1 INJECTION, SOLUTION INTRAVENOUS EVERY 24 HOURS
Status: DISCONTINUED | OUTPATIENT
Start: 2017-01-01 | End: 2017-01-01 | Stop reason: HOSPADM

## 2017-01-01 RX ORDER — IPRATROPIUM BROMIDE AND ALBUTEROL SULFATE 2.5; .5 MG/3ML; MG/3ML
3 SOLUTION RESPIRATORY (INHALATION) EVERY 4 HOURS PRN
Status: DISCONTINUED | OUTPATIENT
Start: 2017-01-01 | End: 2017-01-01

## 2017-01-01 RX ORDER — MULTIVITAMIN
TABLET ORAL
Qty: 30 TABLET | Refills: 2 | Status: SHIPPED | OUTPATIENT
Start: 2017-01-01 | End: 2017-01-01 | Stop reason: HOSPADM

## 2017-01-01 RX ORDER — DIGOXIN 125 MCG
125 TABLET ORAL
Status: DISCONTINUED | OUTPATIENT
Start: 2017-01-01 | End: 2017-01-01 | Stop reason: HOSPADM

## 2017-01-01 RX ORDER — DIGOXIN 0.25 MG/ML
250 INJECTION INTRAMUSCULAR; INTRAVENOUS ONCE
Status: COMPLETED | OUTPATIENT
Start: 2017-01-01 | End: 2017-01-01

## 2017-01-01 RX ORDER — PANTOPRAZOLE SODIUM 40 MG/1
40 TABLET, DELAYED RELEASE ORAL
Status: DISCONTINUED | OUTPATIENT
Start: 2017-01-01 | End: 2017-01-01 | Stop reason: HOSPADM

## 2017-01-01 RX ORDER — TAMSULOSIN HYDROCHLORIDE 0.4 MG/1
0.4 CAPSULE ORAL DAILY
Status: DISCONTINUED | OUTPATIENT
Start: 2017-01-01 | End: 2017-01-01 | Stop reason: HOSPADM

## 2017-01-01 RX ORDER — METOPROLOL TARTRATE 5 MG/5ML
2.5 INJECTION INTRAVENOUS ONCE
Status: COMPLETED | OUTPATIENT
Start: 2017-01-01 | End: 2017-01-01

## 2017-01-01 RX ORDER — BUDESONIDE 0.5 MG/2ML
INHALANT ORAL
Qty: 120 ML | Refills: 0 | Status: SHIPPED | OUTPATIENT
Start: 2017-01-01 | End: 2017-01-01 | Stop reason: SDUPTHER

## 2017-01-01 RX ORDER — ACETAMINOPHEN 650 MG/1
650 SUPPOSITORY RECTAL EVERY 4 HOURS PRN
Status: DISCONTINUED | OUTPATIENT
Start: 2017-01-01 | End: 2017-01-01 | Stop reason: HOSPADM

## 2017-01-01 RX ORDER — ONDANSETRON 4 MG/1
4 TABLET, FILM COATED ORAL EVERY 6 HOURS PRN
Status: DISCONTINUED | OUTPATIENT
Start: 2017-01-01 | End: 2017-01-01 | Stop reason: HOSPADM

## 2017-01-01 RX ORDER — DOXYCYCLINE 100 MG/1
100 CAPSULE ORAL EVERY 12 HOURS
Status: DISCONTINUED | OUTPATIENT
Start: 2017-01-01 | End: 2017-01-01

## 2017-01-01 RX ORDER — POTASSIUM CHLORIDE 750 MG/1
40 CAPSULE, EXTENDED RELEASE ORAL AS NEEDED
Status: DISCONTINUED | OUTPATIENT
Start: 2017-01-01 | End: 2017-01-01

## 2017-01-01 RX ORDER — SODIUM CHLORIDE 0.9 % (FLUSH) 0.9 %
1-10 SYRINGE (ML) INJECTION AS NEEDED
Status: DISCONTINUED | OUTPATIENT
Start: 2017-01-01 | End: 2017-01-01 | Stop reason: HOSPADM

## 2017-01-01 RX ORDER — HEPARIN SODIUM 5000 [USP'U]/ML
5000 INJECTION, SOLUTION INTRAVENOUS; SUBCUTANEOUS EVERY 12 HOURS SCHEDULED
Status: DISCONTINUED | OUTPATIENT
Start: 2017-01-01 | End: 2017-01-01

## 2017-01-01 RX ORDER — METOPROLOL SUCCINATE 100 MG/1
100 TABLET, EXTENDED RELEASE ORAL DAILY
Status: DISCONTINUED | OUTPATIENT
Start: 2017-01-01 | End: 2017-01-01

## 2017-01-01 RX ORDER — ASPIRIN 325 MG
TABLET, DELAYED RELEASE (ENTERIC COATED) ORAL
Qty: 30 TABLET | Refills: 2 | Status: ON HOLD | OUTPATIENT
Start: 2017-01-01 | End: 2017-01-01

## 2017-01-01 RX ORDER — FUROSEMIDE 40 MG/1
40 TABLET ORAL 2 TIMES DAILY
Status: DISCONTINUED | OUTPATIENT
Start: 2017-01-01 | End: 2017-01-01

## 2017-01-01 RX ADMIN — BUSPIRONE HYDROCHLORIDE 5 MG: 5 TABLET ORAL at 09:27

## 2017-01-01 RX ADMIN — BUDESONIDE 0.5 MG: 0.5 INHALANT RESPIRATORY (INHALATION) at 22:03

## 2017-01-01 RX ADMIN — NYSTATIN: 100000 POWDER TOPICAL at 21:32

## 2017-01-01 RX ADMIN — DOXYCYCLINE 100 MG: 100 INJECTION, POWDER, LYOPHILIZED, FOR SOLUTION INTRAVENOUS at 14:07

## 2017-01-01 RX ADMIN — DIGOXIN 125 MCG: 0.12 TABLET ORAL at 12:09

## 2017-01-01 RX ADMIN — AZITHROMYCIN DIHYDRATE 500 MG: 500 INJECTION, POWDER, LYOPHILIZED, FOR SOLUTION INTRAVENOUS at 03:59

## 2017-01-01 RX ADMIN — IPRATROPIUM BROMIDE AND ALBUTEROL SULFATE 3 ML: .5; 3 SOLUTION RESPIRATORY (INHALATION) at 11:10

## 2017-01-01 RX ADMIN — BUSPIRONE HYDROCHLORIDE 7.5 MG: 15 TABLET ORAL at 09:18

## 2017-01-01 RX ADMIN — FUROSEMIDE 40 MG: 10 INJECTION, SOLUTION INTRAMUSCULAR; INTRAVENOUS at 11:19

## 2017-01-01 RX ADMIN — FLUTICASONE PROPIONATE 2 SPRAY: 50 SPRAY, METERED NASAL at 20:43

## 2017-01-01 RX ADMIN — FUROSEMIDE 40 MG: 40 TABLET ORAL at 08:38

## 2017-01-01 RX ADMIN — METOPROLOL SUCCINATE 100 MG: 100 TABLET, FILM COATED, EXTENDED RELEASE ORAL at 08:55

## 2017-01-01 RX ADMIN — BUSPIRONE HYDROCHLORIDE 5 MG: 5 TABLET ORAL at 18:52

## 2017-01-01 RX ADMIN — DOXYCYCLINE 100 MG: 100 INJECTION, POWDER, LYOPHILIZED, FOR SOLUTION INTRAVENOUS at 15:41

## 2017-01-01 RX ADMIN — FUROSEMIDE 20 MG: 20 TABLET ORAL at 16:52

## 2017-01-01 RX ADMIN — FUROSEMIDE 40 MG: 40 TABLET ORAL at 17:03

## 2017-01-01 RX ADMIN — CALCIUM CARBONATE-VITAMIN D TAB 500 MG-200 UNIT 1000 MG: 500-200 TAB at 17:29

## 2017-01-01 RX ADMIN — BUSPIRONE HYDROCHLORIDE 7.5 MG: 15 TABLET ORAL at 08:55

## 2017-01-01 RX ADMIN — BUSPIRONE HYDROCHLORIDE 7.5 MG: 15 TABLET ORAL at 10:25

## 2017-01-01 RX ADMIN — BUSPIRONE HYDROCHLORIDE 7.5 MG: 15 TABLET ORAL at 18:50

## 2017-01-01 RX ADMIN — METHYLPREDNISOLONE SODIUM SUCCINATE 80 MG: 125 INJECTION, POWDER, FOR SOLUTION INTRAMUSCULAR; INTRAVENOUS at 17:17

## 2017-01-01 RX ADMIN — HEPARIN SODIUM 5000 UNITS: 5000 INJECTION, SOLUTION INTRAVENOUS; SUBCUTANEOUS at 21:51

## 2017-01-01 RX ADMIN — ALBUTEROL SULFATE 2.5 MG: 2.5 SOLUTION RESPIRATORY (INHALATION) at 03:44

## 2017-01-01 RX ADMIN — HEPARIN SODIUM 5000 UNITS: 5000 INJECTION, SOLUTION INTRAVENOUS; SUBCUTANEOUS at 10:32

## 2017-01-01 RX ADMIN — BUSPIRONE HYDROCHLORIDE 5 MG: 5 TABLET ORAL at 19:20

## 2017-01-01 RX ADMIN — ENOXAPARIN SODIUM 40 MG: 40 INJECTION SUBCUTANEOUS at 20:51

## 2017-01-01 RX ADMIN — BUSPIRONE HYDROCHLORIDE 7.5 MG: 15 TABLET ORAL at 08:47

## 2017-01-01 RX ADMIN — NYSTATIN: 100000 POWDER TOPICAL at 08:38

## 2017-01-01 RX ADMIN — IPRATROPIUM BROMIDE AND ALBUTEROL SULFATE 3 ML: .5; 3 SOLUTION RESPIRATORY (INHALATION) at 15:49

## 2017-01-01 RX ADMIN — ASPIRIN 325 MG: 325 TABLET, DELAYED RELEASE ORAL at 14:33

## 2017-01-01 RX ADMIN — PANTOPRAZOLE SODIUM 40 MG: 40 TABLET, DELAYED RELEASE ORAL at 08:20

## 2017-01-01 RX ADMIN — BUSPIRONE HYDROCHLORIDE 7.5 MG: 15 TABLET ORAL at 18:00

## 2017-01-01 RX ADMIN — ENOXAPARIN SODIUM 90 MG: 100 INJECTION SUBCUTANEOUS at 06:51

## 2017-01-01 RX ADMIN — IPRATROPIUM BROMIDE AND ALBUTEROL SULFATE 3 ML: .5; 3 SOLUTION RESPIRATORY (INHALATION) at 07:03

## 2017-01-01 RX ADMIN — METOPROLOL SUCCINATE 50 MG: 50 TABLET, FILM COATED, EXTENDED RELEASE ORAL at 12:35

## 2017-01-01 RX ADMIN — DOXYCYCLINE 100 MG: 100 INJECTION, POWDER, LYOPHILIZED, FOR SOLUTION INTRAVENOUS at 01:47

## 2017-01-01 RX ADMIN — BUDESONIDE 0.5 MG: 0.5 INHALANT RESPIRATORY (INHALATION) at 07:31

## 2017-01-01 RX ADMIN — POTASSIUM CHLORIDE 40 MEQ: 750 CAPSULE, EXTENDED RELEASE ORAL at 15:16

## 2017-01-01 RX ADMIN — BUSPIRONE HYDROCHLORIDE 7.5 MG: 15 TABLET ORAL at 09:55

## 2017-01-01 RX ADMIN — CALCIUM CARBONATE-VITAMIN D TAB 500 MG-200 UNIT 1000 MG: 500-200 TAB at 17:02

## 2017-01-01 RX ADMIN — CALCIUM CARBONATE-VITAMIN D TAB 500 MG-200 UNIT 1000 MG: 500-200 TAB at 10:33

## 2017-01-01 RX ADMIN — CETIRIZINE HYDROCHLORIDE 5 MG: 10 TABLET, FILM COATED ORAL at 21:14

## 2017-01-01 RX ADMIN — AZITHROMYCIN DIHYDRATE 500 MG: 500 INJECTION, POWDER, LYOPHILIZED, FOR SOLUTION INTRAVENOUS at 03:52

## 2017-01-01 RX ADMIN — CALCIUM CARBONATE-VITAMIN D TAB 500 MG-200 UNIT 1000 MG: 500-200 TAB at 18:12

## 2017-01-01 RX ADMIN — LORAZEPAM 0.25 MG: 0.5 TABLET ORAL at 18:38

## 2017-01-01 RX ADMIN — NYSTATIN: 100000 POWDER TOPICAL at 10:59

## 2017-01-01 RX ADMIN — LORAZEPAM 0.25 MG: 0.5 TABLET ORAL at 16:28

## 2017-01-01 RX ADMIN — DIGOXIN 125 MCG: 0.12 TABLET ORAL at 13:59

## 2017-01-01 RX ADMIN — ARFORMOTEROL TARTRATE 15 MCG: 15 SOLUTION RESPIRATORY (INHALATION) at 09:11

## 2017-01-01 RX ADMIN — NYSTATIN: 100000 POWDER TOPICAL at 20:46

## 2017-01-01 RX ADMIN — TAMSULOSIN HYDROCHLORIDE 0.4 MG: 0.4 CAPSULE ORAL at 09:46

## 2017-01-01 RX ADMIN — IPRATROPIUM BROMIDE AND ALBUTEROL SULFATE 3 ML: .5; 3 SOLUTION RESPIRATORY (INHALATION) at 03:49

## 2017-01-01 RX ADMIN — BUDESONIDE 0.5 MG: 0.5 INHALANT RESPIRATORY (INHALATION) at 07:03

## 2017-01-01 RX ADMIN — FUROSEMIDE 40 MG: 10 INJECTION, SOLUTION INTRAMUSCULAR; INTRAVENOUS at 11:56

## 2017-01-01 RX ADMIN — ARFORMOTEROL TARTRATE 15 MCG: 15 SOLUTION RESPIRATORY (INHALATION) at 12:42

## 2017-01-01 RX ADMIN — BUSPIRONE HYDROCHLORIDE 7.5 MG: 15 TABLET ORAL at 17:16

## 2017-01-01 RX ADMIN — METHYLPREDNISOLONE SODIUM SUCCINATE 80 MG: 125 INJECTION, POWDER, FOR SOLUTION INTRAMUSCULAR; INTRAVENOUS at 11:51

## 2017-01-01 RX ADMIN — BUSPIRONE HYDROCHLORIDE 7.5 MG: 15 TABLET ORAL at 17:02

## 2017-01-01 RX ADMIN — NYSTATIN: 100000 POWDER TOPICAL at 09:09

## 2017-01-01 RX ADMIN — ARFORMOTEROL TARTRATE 15 MCG: 15 SOLUTION RESPIRATORY (INHALATION) at 20:06

## 2017-01-01 RX ADMIN — DIGOXIN 125 MCG: 0.12 TABLET ORAL at 11:51

## 2017-01-01 RX ADMIN — BUSPIRONE HYDROCHLORIDE 7.5 MG: 15 TABLET ORAL at 08:52

## 2017-01-01 RX ADMIN — METOPROLOL SUCCINATE 200 MG: 100 TABLET, FILM COATED, EXTENDED RELEASE ORAL at 09:37

## 2017-01-01 RX ADMIN — IPRATROPIUM BROMIDE AND ALBUTEROL SULFATE 3 ML: .5; 3 SOLUTION RESPIRATORY (INHALATION) at 06:39

## 2017-01-01 RX ADMIN — ARFORMOTEROL TARTRATE 15 MCG: 15 SOLUTION RESPIRATORY (INHALATION) at 22:11

## 2017-01-01 RX ADMIN — METOPROLOL SUCCINATE 100 MG: 100 TABLET, FILM COATED, EXTENDED RELEASE ORAL at 08:52

## 2017-01-01 RX ADMIN — CEFTRIAXONE SODIUM 1 G: 1 INJECTION, SOLUTION INTRAVENOUS at 03:25

## 2017-01-01 RX ADMIN — BUSPIRONE HYDROCHLORIDE 5 MG: 5 TABLET ORAL at 08:20

## 2017-01-01 RX ADMIN — DIGOXIN 125 MCG: 0.12 TABLET ORAL at 11:56

## 2017-01-01 RX ADMIN — IPRATROPIUM BROMIDE AND ALBUTEROL SULFATE 3 ML: .5; 3 SOLUTION RESPIRATORY (INHALATION) at 03:33

## 2017-01-01 RX ADMIN — ENOXAPARIN SODIUM 40 MG: 40 INJECTION SUBCUTANEOUS at 21:14

## 2017-01-01 RX ADMIN — PANTOPRAZOLE SODIUM 40 MG: 40 TABLET, DELAYED RELEASE ORAL at 06:47

## 2017-01-01 RX ADMIN — DIGOXIN 125 MCG: 0.12 TABLET ORAL at 12:53

## 2017-01-01 RX ADMIN — ONDANSETRON 4 MG: 2 INJECTION INTRAMUSCULAR; INTRAVENOUS at 17:25

## 2017-01-01 RX ADMIN — ASPIRIN 325 MG: 325 TABLET, DELAYED RELEASE ORAL at 09:27

## 2017-01-01 RX ADMIN — DOXYCYCLINE 100 MG: 100 INJECTION, POWDER, LYOPHILIZED, FOR SOLUTION INTRAVENOUS at 16:45

## 2017-01-01 RX ADMIN — CALCIUM CARBONATE-VITAMIN D TAB 500 MG-200 UNIT 1000 MG: 500-200 TAB at 09:09

## 2017-01-01 RX ADMIN — DIGOXIN 125 MCG: 0.12 TABLET ORAL at 12:34

## 2017-01-01 RX ADMIN — CALCIUM CARBONATE-VITAMIN D TAB 500 MG-200 UNIT 1000 MG: 500-200 TAB at 08:55

## 2017-01-01 RX ADMIN — BUDESONIDE 0.5 MG: 0.5 INHALANT RESPIRATORY (INHALATION) at 07:23

## 2017-01-01 RX ADMIN — METOPROLOL SUCCINATE 200 MG: 100 TABLET, FILM COATED, EXTENDED RELEASE ORAL at 10:33

## 2017-01-01 RX ADMIN — ARFORMOTEROL TARTRATE 15 MCG: 15 SOLUTION RESPIRATORY (INHALATION) at 10:35

## 2017-01-01 RX ADMIN — PANTOPRAZOLE SODIUM 40 MG: 40 TABLET, DELAYED RELEASE ORAL at 05:32

## 2017-01-01 RX ADMIN — ARFORMOTEROL TARTRATE 15 MCG: 15 SOLUTION RESPIRATORY (INHALATION) at 11:08

## 2017-01-01 RX ADMIN — PANTOPRAZOLE SODIUM 40 MG: 40 TABLET, DELAYED RELEASE ORAL at 07:39

## 2017-01-01 RX ADMIN — IPRATROPIUM BROMIDE AND ALBUTEROL SULFATE 3 ML: .5; 3 SOLUTION RESPIRATORY (INHALATION) at 23:34

## 2017-01-01 RX ADMIN — CALCIUM CARBONATE-VITAMIN D TAB 500 MG-200 UNIT 1000 MG: 500-200 TAB at 08:38

## 2017-01-01 RX ADMIN — BUSPIRONE HYDROCHLORIDE 5 MG: 5 TABLET ORAL at 18:44

## 2017-01-01 RX ADMIN — FUROSEMIDE 40 MG: 40 TABLET ORAL at 09:08

## 2017-01-01 RX ADMIN — NYSTATIN: 100000 POWDER TOPICAL at 08:45

## 2017-01-01 RX ADMIN — BUSPIRONE HYDROCHLORIDE 7.5 MG: 15 TABLET ORAL at 08:57

## 2017-01-01 RX ADMIN — HEPARIN SODIUM 5000 UNITS: 5000 INJECTION, SOLUTION INTRAVENOUS; SUBCUTANEOUS at 21:01

## 2017-01-01 RX ADMIN — BUDESONIDE 0.5 MG: 0.5 INHALANT RESPIRATORY (INHALATION) at 09:10

## 2017-01-01 RX ADMIN — FUROSEMIDE 40 MG: 10 INJECTION, SOLUTION INTRAMUSCULAR; INTRAVENOUS at 23:33

## 2017-01-01 RX ADMIN — ASPIRIN 325 MG: 325 TABLET, DELAYED RELEASE ORAL at 09:52

## 2017-01-01 RX ADMIN — PANTOPRAZOLE SODIUM 40 MG: 40 TABLET, DELAYED RELEASE ORAL at 16:02

## 2017-01-01 RX ADMIN — FLUTICASONE PROPIONATE 2 SPRAY: 50 SPRAY, METERED NASAL at 08:38

## 2017-01-01 RX ADMIN — METHYLPREDNISOLONE SODIUM SUCCINATE 80 MG: 125 INJECTION, POWDER, FOR SOLUTION INTRAMUSCULAR; INTRAVENOUS at 02:21

## 2017-01-01 RX ADMIN — BUDESONIDE 0.5 MG: 0.5 INHALANT RESPIRATORY (INHALATION) at 19:39

## 2017-01-01 RX ADMIN — TAMSULOSIN HYDROCHLORIDE 0.4 MG: 0.4 CAPSULE ORAL at 08:21

## 2017-01-01 RX ADMIN — METHYLPREDNISOLONE SODIUM SUCCINATE 80 MG: 125 INJECTION, POWDER, FOR SOLUTION INTRAMUSCULAR; INTRAVENOUS at 09:53

## 2017-01-01 RX ADMIN — BUDESONIDE 0.5 MG: 0.5 INHALANT RESPIRATORY (INHALATION) at 23:04

## 2017-01-01 RX ADMIN — BUSPIRONE HYDROCHLORIDE 5 MG: 5 TABLET ORAL at 09:09

## 2017-01-01 RX ADMIN — BUDESONIDE 0.5 MG: 0.5 INHALANT RESPIRATORY (INHALATION) at 07:45

## 2017-01-01 RX ADMIN — DOXYCYCLINE 100 MG: 100 CAPSULE ORAL at 17:04

## 2017-01-01 RX ADMIN — METHYLPREDNISOLONE SODIUM SUCCINATE 80 MG: 125 INJECTION, POWDER, FOR SOLUTION INTRAMUSCULAR; INTRAVENOUS at 02:37

## 2017-01-01 RX ADMIN — HEPARIN SODIUM 5000 UNITS: 5000 INJECTION, SOLUTION INTRAVENOUS; SUBCUTANEOUS at 20:57

## 2017-01-01 RX ADMIN — NYSTATIN: 100000 POWDER TOPICAL at 09:47

## 2017-01-01 RX ADMIN — CALCIUM CARBONATE-VITAMIN D TAB 500 MG-200 UNIT 1000 MG: 500-200 TAB at 17:16

## 2017-01-01 RX ADMIN — FLUTICASONE PROPIONATE 2 SPRAY: 50 SPRAY, METERED NASAL at 08:58

## 2017-01-01 RX ADMIN — METHYLPREDNISOLONE SODIUM SUCCINATE 80 MG: 125 INJECTION, POWDER, FOR SOLUTION INTRAMUSCULAR; INTRAVENOUS at 17:02

## 2017-01-01 RX ADMIN — IPRATROPIUM BROMIDE AND ALBUTEROL SULFATE 3 ML: .5; 3 SOLUTION RESPIRATORY (INHALATION) at 07:19

## 2017-01-01 RX ADMIN — DOXYCYCLINE 100 MG: 100 CAPSULE ORAL at 07:43

## 2017-01-01 RX ADMIN — BUSPIRONE HYDROCHLORIDE 7.5 MG: 15 TABLET ORAL at 18:46

## 2017-01-01 RX ADMIN — ASPIRIN 325 MG: 325 TABLET, DELAYED RELEASE ORAL at 09:09

## 2017-01-01 RX ADMIN — PANTOPRAZOLE SODIUM 40 MG: 40 TABLET, DELAYED RELEASE ORAL at 07:30

## 2017-01-01 RX ADMIN — METOPROLOL SUCCINATE 200 MG: 100 TABLET, FILM COATED, EXTENDED RELEASE ORAL at 09:46

## 2017-01-01 RX ADMIN — A/SINGAPORE/GP1908/2015 IVR-180 (H1N1) (AN A/MICHIGAN/45/2015-LIKE VIRUS), A/SINGAPORE/GP2050/2015 (H3N2) (AN A/HONG KONG/4801/2014 - LIKE VIRUS), B/UTAH/9/2014 (A B/PHUKET/3073/2013-LIKE VIRUS), B/HONG KONG/259/2010 (A B/BRISBANE/60/08-LIKE VIRUS) 0.5 ML: 15; 15; 15; 15 INJECTION, SUSPENSION INTRAMUSCULAR at 13:15

## 2017-01-01 RX ADMIN — SCOPALAMINE 1 PATCH: 1 PATCH, EXTENDED RELEASE TRANSDERMAL at 17:20

## 2017-01-01 RX ADMIN — BUDESONIDE 0.5 MG: 0.5 INHALANT RESPIRATORY (INHALATION) at 07:38

## 2017-01-01 RX ADMIN — ARFORMOTEROL TARTRATE 15 MCG: 15 SOLUTION RESPIRATORY (INHALATION) at 08:22

## 2017-01-01 RX ADMIN — CALCIUM CARBONATE-VITAMIN D TAB 500 MG-200 UNIT 1000 MG: 500-200 TAB at 18:09

## 2017-01-01 RX ADMIN — ARFORMOTEROL TARTRATE 15 MCG: 15 SOLUTION RESPIRATORY (INHALATION) at 09:51

## 2017-01-01 RX ADMIN — ARFORMOTEROL TARTRATE 15 MCG: 15 SOLUTION RESPIRATORY (INHALATION) at 21:18

## 2017-01-01 RX ADMIN — PREDNISONE 40 MG: 20 TABLET ORAL at 08:38

## 2017-01-01 RX ADMIN — NYSTATIN: 100000 POWDER TOPICAL at 21:09

## 2017-01-01 RX ADMIN — IPRATROPIUM BROMIDE AND ALBUTEROL SULFATE 3 ML: .5; 3 SOLUTION RESPIRATORY (INHALATION) at 03:16

## 2017-01-01 RX ADMIN — BUDESONIDE 0.5 MG: 0.5 INHALANT RESPIRATORY (INHALATION) at 20:43

## 2017-01-01 RX ADMIN — METOPROLOL SUCCINATE 200 MG: 100 TABLET, FILM COATED, EXTENDED RELEASE ORAL at 08:47

## 2017-01-01 RX ADMIN — PANTOPRAZOLE SODIUM 40 MG: 40 TABLET, DELAYED RELEASE ORAL at 06:28

## 2017-01-01 RX ADMIN — ARFORMOTEROL TARTRATE 15 MCG: 15 SOLUTION RESPIRATORY (INHALATION) at 11:03

## 2017-01-01 RX ADMIN — BUDESONIDE 0.5 MG: 0.5 INHALANT RESPIRATORY (INHALATION) at 19:06

## 2017-01-01 RX ADMIN — BUSPIRONE HYDROCHLORIDE 7.5 MG: 15 TABLET ORAL at 10:33

## 2017-01-01 RX ADMIN — ASPIRIN 81 MG: 81 TABLET ORAL at 08:52

## 2017-01-01 RX ADMIN — GLYCOPYRROLATE 0.2 MG: 0.2 INJECTION, SOLUTION INTRAMUSCULAR; INTRAVENOUS at 20:18

## 2017-01-01 RX ADMIN — HEPARIN SODIUM 5000 UNITS: 5000 INJECTION, SOLUTION INTRAVENOUS; SUBCUTANEOUS at 21:11

## 2017-01-01 RX ADMIN — BUSPIRONE HYDROCHLORIDE 7.5 MG: 15 TABLET ORAL at 17:15

## 2017-01-01 RX ADMIN — METHYLPREDNISOLONE SODIUM SUCCINATE 80 MG: 125 INJECTION, POWDER, FOR SOLUTION INTRAMUSCULAR; INTRAVENOUS at 21:10

## 2017-01-01 RX ADMIN — PANTOPRAZOLE SODIUM 40 MG: 40 TABLET, DELAYED RELEASE ORAL at 09:52

## 2017-01-01 RX ADMIN — NYSTATIN: 100000 POWDER TOPICAL at 09:28

## 2017-01-01 RX ADMIN — DOXYCYCLINE 100 MG: 100 INJECTION, POWDER, LYOPHILIZED, FOR SOLUTION INTRAVENOUS at 14:19

## 2017-01-01 RX ADMIN — NYSTATIN: 100000 POWDER TOPICAL at 22:36

## 2017-01-01 RX ADMIN — CALCIUM CARBONATE-VITAMIN D TAB 500 MG-200 UNIT 1000 MG: 500-200 TAB at 18:46

## 2017-01-01 RX ADMIN — ASPIRIN 81 MG: 81 TABLET ORAL at 09:27

## 2017-01-01 RX ADMIN — Medication 1 CAPSULE: at 09:46

## 2017-01-01 RX ADMIN — ASPIRIN 325 MG: 325 TABLET, DELAYED RELEASE ORAL at 08:21

## 2017-01-01 RX ADMIN — Medication 10 ML: at 08:48

## 2017-01-01 RX ADMIN — BUDESONIDE 0.5 MG: 0.5 INHALANT RESPIRATORY (INHALATION) at 08:40

## 2017-01-01 RX ADMIN — AZITHROMYCIN 500 MG: 250 TABLET, FILM COATED ORAL at 09:27

## 2017-01-01 RX ADMIN — Medication 1 CAPSULE: at 19:20

## 2017-01-01 RX ADMIN — METOPROLOL SUCCINATE 100 MG: 100 TABLET, FILM COATED, EXTENDED RELEASE ORAL at 09:27

## 2017-01-01 RX ADMIN — DOXYCYCLINE 100 MG: 100 INJECTION, POWDER, LYOPHILIZED, FOR SOLUTION INTRAVENOUS at 03:24

## 2017-01-01 RX ADMIN — CALCIUM CARBONATE-VITAMIN D TAB 500 MG-200 UNIT 1000 MG: 500-200 TAB at 09:54

## 2017-01-01 RX ADMIN — NYSTATIN 1 APPLICATION: 100000 POWDER TOPICAL at 23:02

## 2017-01-01 RX ADMIN — PREDNISONE 40 MG: 20 TABLET ORAL at 08:04

## 2017-01-01 RX ADMIN — ARFORMOTEROL TARTRATE 15 MCG: 15 SOLUTION RESPIRATORY (INHALATION) at 07:31

## 2017-01-01 RX ADMIN — DIGOXIN 125 MCG: 0.12 TABLET ORAL at 12:22

## 2017-01-01 RX ADMIN — ASPIRIN 81 MG: 81 TABLET ORAL at 10:25

## 2017-01-01 RX ADMIN — NYSTATIN: 100000 POWDER TOPICAL at 20:30

## 2017-01-01 RX ADMIN — PANTOPRAZOLE SODIUM 40 MG: 40 TABLET, DELAYED RELEASE ORAL at 06:12

## 2017-01-01 RX ADMIN — FLUTICASONE PROPIONATE 2 SPRAY: 50 SPRAY, METERED NASAL at 08:55

## 2017-01-01 RX ADMIN — CALCIUM CARBONATE-VITAMIN D TAB 500 MG-200 UNIT 1000 MG: 500-200 TAB at 09:28

## 2017-01-01 RX ADMIN — FLUTICASONE PROPIONATE 2 SPRAY: 50 SPRAY, METERED NASAL at 10:28

## 2017-01-01 RX ADMIN — IPRATROPIUM BROMIDE AND ALBUTEROL SULFATE 3 ML: .5; 3 SOLUTION RESPIRATORY (INHALATION) at 19:39

## 2017-01-01 RX ADMIN — DOXYCYCLINE 100 MG: 100 INJECTION, POWDER, LYOPHILIZED, FOR SOLUTION INTRAVENOUS at 15:50

## 2017-01-01 RX ADMIN — CALCIUM CARBONATE-VITAMIN D TAB 500 MG-200 UNIT 1000 MG: 500-200 TAB at 08:21

## 2017-01-01 RX ADMIN — CETIRIZINE HYDROCHLORIDE 5 MG: 10 TABLET, FILM COATED ORAL at 21:05

## 2017-01-01 RX ADMIN — DOXYCYCLINE 100 MG: 100 INJECTION, POWDER, LYOPHILIZED, FOR SOLUTION INTRAVENOUS at 01:58

## 2017-01-01 RX ADMIN — IPRATROPIUM BROMIDE AND ALBUTEROL SULFATE 3 ML: .5; 3 SOLUTION RESPIRATORY (INHALATION) at 14:48

## 2017-01-01 RX ADMIN — ARFORMOTEROL TARTRATE 15 MCG: 15 SOLUTION RESPIRATORY (INHALATION) at 19:25

## 2017-01-01 RX ADMIN — IPRATROPIUM BROMIDE AND ALBUTEROL SULFATE 3 ML: .5; 3 SOLUTION RESPIRATORY (INHALATION) at 18:55

## 2017-01-01 RX ADMIN — NYSTATIN: 100000 POWDER TOPICAL at 20:43

## 2017-01-01 RX ADMIN — CALCIUM CARBONATE-VITAMIN D TAB 500 MG-200 UNIT 1000 MG: 500-200 TAB at 20:15

## 2017-01-01 RX ADMIN — HEPARIN SODIUM 5000 UNITS: 5000 INJECTION, SOLUTION INTRAVENOUS; SUBCUTANEOUS at 08:47

## 2017-01-01 RX ADMIN — NYSTATIN: 100000 POWDER TOPICAL at 09:52

## 2017-01-01 RX ADMIN — BUDESONIDE 0.5 MG: 0.5 INHALANT RESPIRATORY (INHALATION) at 19:26

## 2017-01-01 RX ADMIN — NYSTATIN: 100000 POWDER TOPICAL at 22:09

## 2017-01-01 RX ADMIN — CALCIUM CARBONATE-VITAMIN D TAB 500 MG-200 UNIT 1000 MG: 500-200 TAB at 21:42

## 2017-01-01 RX ADMIN — METHYLPREDNISOLONE SODIUM SUCCINATE 80 MG: 125 INJECTION, POWDER, FOR SOLUTION INTRAMUSCULAR; INTRAVENOUS at 17:30

## 2017-01-01 RX ADMIN — CALCIUM CARBONATE-VITAMIN D TAB 500 MG-200 UNIT 1000 MG: 500-200 TAB at 20:46

## 2017-01-01 RX ADMIN — NYSTATIN: 100000 POWDER TOPICAL at 08:58

## 2017-01-01 RX ADMIN — IPRATROPIUM BROMIDE AND ALBUTEROL SULFATE 3 ML: .5; 3 SOLUTION RESPIRATORY (INHALATION) at 19:06

## 2017-01-01 RX ADMIN — LORAZEPAM 0.25 MG: 0.5 TABLET ORAL at 12:02

## 2017-01-01 RX ADMIN — ENOXAPARIN SODIUM 40 MG: 40 INJECTION SUBCUTANEOUS at 21:05

## 2017-01-01 RX ADMIN — HEPARIN SODIUM 5000 UNITS: 5000 INJECTION, SOLUTION INTRAVENOUS; SUBCUTANEOUS at 20:43

## 2017-01-01 RX ADMIN — BUDESONIDE 0.5 MG: 0.5 INHALANT RESPIRATORY (INHALATION) at 07:53

## 2017-01-01 RX ADMIN — TAMSULOSIN HYDROCHLORIDE 0.4 MG: 0.4 CAPSULE ORAL at 09:09

## 2017-01-01 RX ADMIN — PANTOPRAZOLE SODIUM 40 MG: 40 TABLET, DELAYED RELEASE ORAL at 05:35

## 2017-01-01 RX ADMIN — METOPROLOL SUCCINATE 200 MG: 100 TABLET, FILM COATED, EXTENDED RELEASE ORAL at 08:21

## 2017-01-01 RX ADMIN — NYSTATIN: 100000 POWDER TOPICAL at 21:06

## 2017-01-01 RX ADMIN — ASPIRIN 81 MG: 81 TABLET ORAL at 09:18

## 2017-01-01 RX ADMIN — METOPROLOL SUCCINATE 200 MG: 100 TABLET, FILM COATED, EXTENDED RELEASE ORAL at 09:27

## 2017-01-01 RX ADMIN — ASPIRIN 81 MG: 81 TABLET ORAL at 08:55

## 2017-01-01 RX ADMIN — IPRATROPIUM BROMIDE AND ALBUTEROL SULFATE 3 ML: .5; 3 SOLUTION RESPIRATORY (INHALATION) at 23:44

## 2017-01-01 RX ADMIN — ALBUTEROL SULFATE 2.5 MG: 2.5 SOLUTION RESPIRATORY (INHALATION) at 16:11

## 2017-01-01 RX ADMIN — DIGOXIN 125 MCG: 0.12 TABLET ORAL at 11:34

## 2017-01-01 RX ADMIN — BUSPIRONE HYDROCHLORIDE 7.5 MG: 15 TABLET ORAL at 18:38

## 2017-01-01 RX ADMIN — POTASSIUM CHLORIDE 40 MEQ: 750 CAPSULE, EXTENDED RELEASE ORAL at 20:29

## 2017-01-01 RX ADMIN — BUDESONIDE 0.5 MG: 0.5 INHALANT RESPIRATORY (INHALATION) at 06:39

## 2017-01-01 RX ADMIN — PREDNISONE 20 MG: 20 TABLET ORAL at 08:21

## 2017-01-01 RX ADMIN — BUSPIRONE HYDROCHLORIDE 7.5 MG: 15 TABLET ORAL at 09:09

## 2017-01-01 RX ADMIN — ARFORMOTEROL TARTRATE 15 MCG: 15 SOLUTION RESPIRATORY (INHALATION) at 09:10

## 2017-01-01 RX ADMIN — PREDNISONE 20 MG: 20 TABLET ORAL at 09:09

## 2017-01-01 RX ADMIN — HEPARIN SODIUM 5000 UNITS: 5000 INJECTION, SOLUTION INTRAVENOUS; SUBCUTANEOUS at 10:28

## 2017-01-01 RX ADMIN — PANTOPRAZOLE SODIUM 40 MG: 40 TABLET, DELAYED RELEASE ORAL at 06:09

## 2017-01-01 RX ADMIN — NYSTATIN: 100000 POWDER TOPICAL at 20:37

## 2017-01-01 RX ADMIN — NYSTATIN: 100000 POWDER TOPICAL at 08:22

## 2017-01-01 RX ADMIN — ARFORMOTEROL TARTRATE 15 MCG: 15 SOLUTION RESPIRATORY (INHALATION) at 21:08

## 2017-01-01 RX ADMIN — BUDESONIDE 0.5 MG: 0.5 INHALANT RESPIRATORY (INHALATION) at 07:46

## 2017-01-01 RX ADMIN — DIGOXIN 125 MCG: 0.12 TABLET ORAL at 11:37

## 2017-01-01 RX ADMIN — BUDESONIDE 0.5 MG: 0.5 INHALANT RESPIRATORY (INHALATION) at 07:26

## 2017-01-01 RX ADMIN — IPRATROPIUM BROMIDE AND ALBUTEROL SULFATE 3 ML: .5; 3 SOLUTION RESPIRATORY (INHALATION) at 20:48

## 2017-01-01 RX ADMIN — BUDESONIDE 0.5 MG: 0.5 INHALANT RESPIRATORY (INHALATION) at 23:16

## 2017-01-01 RX ADMIN — BUSPIRONE HYDROCHLORIDE 7.5 MG: 15 TABLET ORAL at 18:02

## 2017-01-01 RX ADMIN — PANTOPRAZOLE SODIUM 40 MG: 40 TABLET, DELAYED RELEASE ORAL at 06:15

## 2017-01-01 RX ADMIN — SALINE NASAL SPRAY 1 SPRAY: 1.5 SOLUTION NASAL at 08:21

## 2017-01-01 RX ADMIN — BUDESONIDE 0.5 MG: 0.5 INHALANT RESPIRATORY (INHALATION) at 10:34

## 2017-01-01 RX ADMIN — BUSPIRONE HYDROCHLORIDE 7.5 MG: 15 TABLET ORAL at 17:29

## 2017-01-01 RX ADMIN — DIGOXIN 125 MCG: 0.12 TABLET ORAL at 11:46

## 2017-01-01 RX ADMIN — NYSTATIN: 100000 POWDER TOPICAL at 08:52

## 2017-01-01 RX ADMIN — OXYMETAZOLINE HYDROCHLORIDE 2 SPRAY: 5 SPRAY NASAL at 09:27

## 2017-01-01 RX ADMIN — FUROSEMIDE 40 MG: 40 TABLET ORAL at 19:57

## 2017-01-01 RX ADMIN — FLUTICASONE PROPIONATE 2 SPRAY: 50 SPRAY, METERED NASAL at 09:28

## 2017-01-01 RX ADMIN — METHYLPREDNISOLONE SODIUM SUCCINATE 80 MG: 125 INJECTION, POWDER, FOR SOLUTION INTRAMUSCULAR; INTRAVENOUS at 02:47

## 2017-01-01 RX ADMIN — DIGOXIN 125 MCG: 0.12 TABLET ORAL at 12:58

## 2017-01-01 RX ADMIN — FUROSEMIDE 40 MG: 40 TABLET ORAL at 09:46

## 2017-01-01 RX ADMIN — DOXYCYCLINE 100 MG: 100 CAPSULE ORAL at 08:41

## 2017-01-01 RX ADMIN — DIGOXIN 250 MCG: 0.25 INJECTION INTRAMUSCULAR; INTRAVENOUS at 12:36

## 2017-01-01 RX ADMIN — DOXYCYCLINE 100 MG: 100 INJECTION, POWDER, LYOPHILIZED, FOR SOLUTION INTRAVENOUS at 02:21

## 2017-01-01 RX ADMIN — NYSTATIN: 100000 POWDER TOPICAL at 21:44

## 2017-01-01 RX ADMIN — NYSTATIN: 100000 POWDER TOPICAL at 20:09

## 2017-01-01 RX ADMIN — DIGOXIN 500 MCG: 0.25 INJECTION INTRAMUSCULAR; INTRAVENOUS at 15:49

## 2017-01-01 RX ADMIN — METOPROLOL TARTRATE 2.5 MG: 5 INJECTION INTRAVENOUS at 02:35

## 2017-01-01 RX ADMIN — NYSTATIN: 100000 POWDER TOPICAL at 20:57

## 2017-01-01 RX ADMIN — ARFORMOTEROL TARTRATE 15 MCG: 15 SOLUTION RESPIRATORY (INHALATION) at 19:48

## 2017-01-01 RX ADMIN — IPRATROPIUM BROMIDE AND ALBUTEROL SULFATE 3 ML: .5; 3 SOLUTION RESPIRATORY (INHALATION) at 20:45

## 2017-01-01 RX ADMIN — CALCIUM CARBONATE-VITAMIN D TAB 500 MG-200 UNIT 1000 MG: 500-200 TAB at 20:09

## 2017-01-01 RX ADMIN — DOXYCYCLINE 100 MG: 100 CAPSULE ORAL at 18:12

## 2017-01-01 RX ADMIN — IPRATROPIUM BROMIDE AND ALBUTEROL SULFATE 3 ML: .5; 3 SOLUTION RESPIRATORY (INHALATION) at 11:53

## 2017-01-01 RX ADMIN — BUSPIRONE HYDROCHLORIDE 7.5 MG: 15 TABLET ORAL at 18:12

## 2017-01-01 RX ADMIN — NYSTATIN: 100000 POWDER TOPICAL at 10:28

## 2017-01-01 RX ADMIN — IPRATROPIUM BROMIDE AND ALBUTEROL SULFATE 3 ML: .5; 3 SOLUTION RESPIRATORY (INHALATION) at 04:05

## 2017-01-01 RX ADMIN — HEPARIN SODIUM 5000 UNITS: 5000 INJECTION, SOLUTION INTRAVENOUS; SUBCUTANEOUS at 21:32

## 2017-01-01 RX ADMIN — Medication 1 CAPSULE: at 09:27

## 2017-01-01 RX ADMIN — ARFORMOTEROL TARTRATE 15 MCG: 15 SOLUTION RESPIRATORY (INHALATION) at 07:46

## 2017-01-01 RX ADMIN — METOPROLOL SUCCINATE 100 MG: 100 TABLET, FILM COATED, EXTENDED RELEASE ORAL at 10:25

## 2017-01-01 RX ADMIN — LORAZEPAM 0.25 MG: 0.5 TABLET ORAL at 15:24

## 2017-01-01 RX ADMIN — BUDESONIDE 0.5 MG: 0.5 INHALANT RESPIRATORY (INHALATION) at 12:42

## 2017-01-01 RX ADMIN — IPRATROPIUM BROMIDE AND ALBUTEROL SULFATE 3 ML: .5; 3 SOLUTION RESPIRATORY (INHALATION) at 15:13

## 2017-01-01 RX ADMIN — AZITHROMYCIN 500 MG: 250 TABLET, FILM COATED ORAL at 09:46

## 2017-01-01 RX ADMIN — FUROSEMIDE 40 MG: 10 INJECTION, SOLUTION INTRAMUSCULAR; INTRAVENOUS at 10:28

## 2017-01-01 RX ADMIN — BUSPIRONE HYDROCHLORIDE 7.5 MG: 15 TABLET ORAL at 08:21

## 2017-01-01 RX ADMIN — ARFORMOTEROL TARTRATE 15 MCG: 15 SOLUTION RESPIRATORY (INHALATION) at 23:28

## 2017-01-01 RX ADMIN — ARFORMOTEROL TARTRATE 15 MCG: 15 SOLUTION RESPIRATORY (INHALATION) at 08:40

## 2017-01-01 RX ADMIN — BUDESONIDE 0.5 MG: 0.5 INHALANT RESPIRATORY (INHALATION) at 20:22

## 2017-01-01 RX ADMIN — CEFTRIAXONE SODIUM 1 G: 1 INJECTION, SOLUTION INTRAVENOUS at 03:21

## 2017-01-01 RX ADMIN — ARFORMOTEROL TARTRATE 15 MCG: 15 SOLUTION RESPIRATORY (INHALATION) at 20:27

## 2017-01-01 RX ADMIN — METOPROLOL SUCCINATE 100 MG: 100 TABLET, FILM COATED, EXTENDED RELEASE ORAL at 09:18

## 2017-01-01 RX ADMIN — METHYLPREDNISOLONE SODIUM SUCCINATE 80 MG: 125 INJECTION, POWDER, FOR SOLUTION INTRAMUSCULAR; INTRAVENOUS at 09:15

## 2017-01-01 RX ADMIN — ARFORMOTEROL TARTRATE 15 MCG: 15 SOLUTION RESPIRATORY (INHALATION) at 07:38

## 2017-01-01 RX ADMIN — BUSPIRONE HYDROCHLORIDE 7.5 MG: 15 TABLET ORAL at 18:09

## 2017-01-01 RX ADMIN — FUROSEMIDE 20 MG: 10 INJECTION, SOLUTION INTRAMUSCULAR; INTRAVENOUS at 06:09

## 2017-01-01 RX ADMIN — ARFORMOTEROL TARTRATE 15 MCG: 15 SOLUTION RESPIRATORY (INHALATION) at 23:09

## 2017-01-01 RX ADMIN — BUDESONIDE 0.5 MG: 0.5 INHALANT RESPIRATORY (INHALATION) at 23:33

## 2017-01-01 RX ADMIN — DOXYCYCLINE 100 MG: 100 INJECTION, POWDER, LYOPHILIZED, FOR SOLUTION INTRAVENOUS at 14:08

## 2017-01-01 RX ADMIN — BUSPIRONE HYDROCHLORIDE 7.5 MG: 15 TABLET ORAL at 17:49

## 2017-01-01 RX ADMIN — IPRATROPIUM BROMIDE AND ALBUTEROL SULFATE 3 ML: .5; 3 SOLUTION RESPIRATORY (INHALATION) at 15:15

## 2017-01-01 RX ADMIN — IPRATROPIUM BROMIDE AND ALBUTEROL SULFATE 3 ML: .5; 3 SOLUTION RESPIRATORY (INHALATION) at 12:43

## 2017-01-01 RX ADMIN — HEPARIN SODIUM 5000 UNITS: 5000 INJECTION, SOLUTION INTRAVENOUS; SUBCUTANEOUS at 20:30

## 2017-01-01 RX ADMIN — PREDNISONE 20 MG: 20 TABLET ORAL at 09:27

## 2017-01-01 RX ADMIN — DIGOXIN 125 MCG: 0.12 TABLET ORAL at 11:55

## 2017-01-01 RX ADMIN — IPRATROPIUM BROMIDE AND ALBUTEROL SULFATE 3 ML: .5; 3 SOLUTION RESPIRATORY (INHALATION) at 15:09

## 2017-01-01 RX ADMIN — ARFORMOTEROL TARTRATE 15 MCG: 15 SOLUTION RESPIRATORY (INHALATION) at 07:53

## 2017-01-01 RX ADMIN — CALCIUM CARBONATE-VITAMIN D TAB 500 MG-200 UNIT 1000 MG: 500-200 TAB at 22:10

## 2017-01-01 RX ADMIN — IOPAMIDOL 85 ML: 755 INJECTION, SOLUTION INTRAVENOUS at 11:24

## 2017-01-01 RX ADMIN — METHYLPREDNISOLONE SODIUM SUCCINATE 80 MG: 125 INJECTION, POWDER, FOR SOLUTION INTRAMUSCULAR; INTRAVENOUS at 01:48

## 2017-01-01 RX ADMIN — HYDROMORPHONE HYDROCHLORIDE 1 MG: 1 INJECTION, SOLUTION INTRAMUSCULAR; INTRAVENOUS; SUBCUTANEOUS at 01:51

## 2017-01-01 RX ADMIN — BUSPIRONE HYDROCHLORIDE 7.5 MG: 15 TABLET ORAL at 17:19

## 2017-01-01 RX ADMIN — DIGOXIN 125 MCG: 0.12 TABLET ORAL at 11:57

## 2017-01-01 RX ADMIN — ASPIRIN 81 MG: 81 TABLET ORAL at 10:33

## 2017-01-01 RX ADMIN — AZITHROMYCIN DIHYDRATE 500 MG: 500 INJECTION, POWDER, LYOPHILIZED, FOR SOLUTION INTRAVENOUS at 01:43

## 2017-01-01 RX ADMIN — BUDESONIDE 0.5 MG: 0.5 INHALANT RESPIRATORY (INHALATION) at 11:08

## 2017-01-01 RX ADMIN — DOXYCYCLINE 100 MG: 100 CAPSULE ORAL at 17:26

## 2017-01-01 RX ADMIN — DIGOXIN 125 MCG: 0.12 TABLET ORAL at 12:56

## 2017-01-01 RX ADMIN — IPRATROPIUM BROMIDE AND ALBUTEROL SULFATE 3 ML: .5; 3 SOLUTION RESPIRATORY (INHALATION) at 15:48

## 2017-01-01 RX ADMIN — IPRATROPIUM BROMIDE AND ALBUTEROL SULFATE 3 ML: .5; 3 SOLUTION RESPIRATORY (INHALATION) at 15:11

## 2017-01-01 RX ADMIN — NYSTATIN: 100000 POWDER TOPICAL at 21:11

## 2017-01-01 RX ADMIN — BUDESONIDE 0.5 MG: 0.5 INHALANT RESPIRATORY (INHALATION) at 08:22

## 2017-01-01 RX ADMIN — NYSTATIN: 100000 POWDER TOPICAL at 08:48

## 2017-01-01 RX ADMIN — FUROSEMIDE 40 MG: 10 INJECTION, SOLUTION INTRAMUSCULAR; INTRAVENOUS at 21:14

## 2017-01-01 RX ADMIN — METOPROLOL SUCCINATE 200 MG: 100 TABLET, FILM COATED, EXTENDED RELEASE ORAL at 09:54

## 2017-01-01 RX ADMIN — ARFORMOTEROL TARTRATE 15 MCG: 15 SOLUTION RESPIRATORY (INHALATION) at 23:04

## 2017-01-01 RX ADMIN — PREDNISONE 20 MG: 20 TABLET ORAL at 09:50

## 2017-01-01 RX ADMIN — IPRATROPIUM BROMIDE AND ALBUTEROL SULFATE 3 ML: .5; 3 SOLUTION RESPIRATORY (INHALATION) at 03:46

## 2017-01-01 RX ADMIN — ARFORMOTEROL TARTRATE 15 MCG: 15 SOLUTION RESPIRATORY (INHALATION) at 07:45

## 2017-01-01 RX ADMIN — TAMSULOSIN HYDROCHLORIDE 0.4 MG: 0.4 CAPSULE ORAL at 09:50

## 2017-01-01 RX ADMIN — METOPROLOL SUCCINATE 100 MG: 100 TABLET, FILM COATED, EXTENDED RELEASE ORAL at 08:38

## 2017-01-01 RX ADMIN — BUDESONIDE 0.5 MG: 0.5 INHALANT RESPIRATORY (INHALATION) at 20:27

## 2017-01-01 RX ADMIN — IPRATROPIUM BROMIDE AND ALBUTEROL SULFATE 3 ML: .5; 3 SOLUTION RESPIRATORY (INHALATION) at 15:46

## 2017-01-01 RX ADMIN — FUROSEMIDE 40 MG: 40 TABLET ORAL at 10:33

## 2017-01-01 RX ADMIN — METOPROLOL SUCCINATE 100 MG: 100 TABLET, FILM COATED, EXTENDED RELEASE ORAL at 08:57

## 2017-01-01 RX ADMIN — PANTOPRAZOLE SODIUM 40 MG: 40 TABLET, DELAYED RELEASE ORAL at 09:46

## 2017-01-01 RX ADMIN — METOPROLOL SUCCINATE 50 MG: 50 TABLET, FILM COATED, EXTENDED RELEASE ORAL at 15:49

## 2017-01-01 RX ADMIN — ACETAMINOPHEN 1000 MG: 500 TABLET ORAL at 21:52

## 2017-01-01 RX ADMIN — METHYLPREDNISOLONE SODIUM SUCCINATE 80 MG: 125 INJECTION, POWDER, FOR SOLUTION INTRAMUSCULAR; INTRAVENOUS at 18:46

## 2017-01-01 RX ADMIN — BUDESONIDE 0.5 MG: 0.5 INHALANT RESPIRATORY (INHALATION) at 09:00

## 2017-01-01 RX ADMIN — FUROSEMIDE 20 MG: 10 INJECTION, SOLUTION INTRAMUSCULAR; INTRAVENOUS at 18:47

## 2017-01-01 RX ADMIN — BUDESONIDE 0.5 MG: 0.5 INHALANT RESPIRATORY (INHALATION) at 18:56

## 2017-01-01 RX ADMIN — FUROSEMIDE 40 MG: 40 TABLET ORAL at 14:37

## 2017-01-01 RX ADMIN — HEPARIN SODIUM 5000 UNITS: 5000 INJECTION, SOLUTION INTRAVENOUS; SUBCUTANEOUS at 09:37

## 2017-01-01 RX ADMIN — HYDROMORPHONE HYDROCHLORIDE 1 MG: 1 INJECTION, SOLUTION INTRAMUSCULAR; INTRAVENOUS; SUBCUTANEOUS at 17:40

## 2017-01-01 RX ADMIN — METHYLPREDNISOLONE SODIUM SUCCINATE 80 MG: 125 INJECTION, POWDER, FOR SOLUTION INTRAMUSCULAR; INTRAVENOUS at 10:32

## 2017-01-01 RX ADMIN — PANTOPRAZOLE SODIUM 40 MG: 40 TABLET, DELAYED RELEASE ORAL at 05:19

## 2017-01-01 RX ADMIN — ASPIRIN 81 MG: 81 TABLET ORAL at 09:55

## 2017-01-01 RX ADMIN — BUSPIRONE HYDROCHLORIDE 7.5 MG: 15 TABLET ORAL at 08:04

## 2017-01-01 RX ADMIN — POTASSIUM CHLORIDE 40 MEQ: 750 CAPSULE, EXTENDED RELEASE ORAL at 23:39

## 2017-01-01 RX ADMIN — PANTOPRAZOLE SODIUM 40 MG: 40 TABLET, DELAYED RELEASE ORAL at 06:02

## 2017-01-01 RX ADMIN — NYSTATIN: 100000 POWDER TOPICAL at 21:14

## 2017-01-01 RX ADMIN — FUROSEMIDE 20 MG: 10 INJECTION, SOLUTION INTRAMUSCULAR; INTRAVENOUS at 06:00

## 2017-01-01 RX ADMIN — SODIUM CHLORIDE 500 ML: 9 INJECTION, SOLUTION INTRAVENOUS at 02:38

## 2017-01-01 RX ADMIN — BUSPIRONE HYDROCHLORIDE 7.5 MG: 15 TABLET ORAL at 09:25

## 2017-01-01 RX ADMIN — BUSPIRONE HYDROCHLORIDE 7.5 MG: 15 TABLET ORAL at 17:26

## 2017-01-01 RX ADMIN — NYSTATIN 1 APPLICATION: 100000 POWDER TOPICAL at 21:15

## 2017-01-01 RX ADMIN — BUDESONIDE 0.5 MG: 0.5 INHALANT RESPIRATORY (INHALATION) at 19:48

## 2017-01-01 RX ADMIN — METHYLPREDNISOLONE SODIUM SUCCINATE 80 MG: 125 INJECTION, POWDER, FOR SOLUTION INTRAMUSCULAR; INTRAVENOUS at 09:37

## 2017-01-01 RX ADMIN — FLUTICASONE PROPIONATE 2 SPRAY: 50 SPRAY, METERED NASAL at 09:18

## 2017-01-01 RX ADMIN — CALCIUM CARBONATE-VITAMIN D TAB 500 MG-200 UNIT 1000 MG: 500-200 TAB at 09:37

## 2017-01-01 RX ADMIN — NYSTATIN: 100000 POWDER TOPICAL at 08:05

## 2017-01-01 RX ADMIN — IPRATROPIUM BROMIDE AND ALBUTEROL SULFATE 3 ML: .5; 3 SOLUTION RESPIRATORY (INHALATION) at 07:26

## 2017-01-01 RX ADMIN — IPRATROPIUM BROMIDE AND ALBUTEROL SULFATE 3 ML: .5; 3 SOLUTION RESPIRATORY (INHALATION) at 19:07

## 2017-01-01 RX ADMIN — METOPROLOL SUCCINATE 200 MG: 100 TABLET, FILM COATED, EXTENDED RELEASE ORAL at 09:09

## 2017-01-01 RX ADMIN — ASPIRIN 81 MG: 81 TABLET ORAL at 08:04

## 2017-01-01 RX ADMIN — BUDESONIDE 0.5 MG: 0.5 INHALANT RESPIRATORY (INHALATION) at 20:07

## 2017-01-01 RX ADMIN — DOXYCYCLINE 100 MG: 100 INJECTION, POWDER, LYOPHILIZED, FOR SOLUTION INTRAVENOUS at 02:47

## 2017-01-01 RX ADMIN — GLYCOPYRROLATE 0.2 MG: 0.2 INJECTION, SOLUTION INTRAMUSCULAR; INTRAVENOUS at 00:18

## 2017-01-01 RX ADMIN — CETIRIZINE HYDROCHLORIDE 5 MG: 10 TABLET, FILM COATED ORAL at 20:50

## 2017-01-01 RX ADMIN — ASPIRIN 81 MG: 81 TABLET ORAL at 08:57

## 2017-01-01 RX ADMIN — CALCIUM CARBONATE-VITAMIN D TAB 500 MG-200 UNIT 1000 MG: 500-200 TAB at 20:36

## 2017-01-01 RX ADMIN — IPRATROPIUM BROMIDE AND ALBUTEROL SULFATE 3 ML: .5; 3 SOLUTION RESPIRATORY (INHALATION) at 07:15

## 2017-01-01 RX ADMIN — BUDESONIDE 0.5 MG: 0.5 INHALANT RESPIRATORY (INHALATION) at 22:11

## 2017-01-01 RX ADMIN — ARFORMOTEROL TARTRATE 15 MCG: 15 SOLUTION RESPIRATORY (INHALATION) at 23:19

## 2017-01-01 RX ADMIN — HYDROMORPHONE HYDROCHLORIDE 1 MG: 1 INJECTION, SOLUTION INTRAMUSCULAR; INTRAVENOUS; SUBCUTANEOUS at 12:56

## 2017-01-01 RX ADMIN — Medication 1000 MG: at 21:52

## 2017-01-01 RX ADMIN — IPRATROPIUM BROMIDE AND ALBUTEROL SULFATE 3 ML: .5; 3 SOLUTION RESPIRATORY (INHALATION) at 23:39

## 2017-01-01 RX ADMIN — DOXYCYCLINE 100 MG: 100 CAPSULE ORAL at 05:32

## 2017-01-01 RX ADMIN — CALCIUM CARBONATE-VITAMIN D TAB 500 MG-200 UNIT 1000 MG: 500-200 TAB at 18:47

## 2017-01-01 RX ADMIN — IPRATROPIUM BROMIDE AND ALBUTEROL SULFATE 3 ML: .5; 3 SOLUTION RESPIRATORY (INHALATION) at 23:46

## 2017-01-01 RX ADMIN — CALCIUM CARBONATE-VITAMIN D TAB 500 MG-200 UNIT 1000 MG: 500-200 TAB at 21:11

## 2017-01-01 RX ADMIN — TAMSULOSIN HYDROCHLORIDE 0.4 MG: 0.4 CAPSULE ORAL at 14:34

## 2017-01-01 RX ADMIN — BUSPIRONE HYDROCHLORIDE 7.5 MG: 15 TABLET ORAL at 08:39

## 2017-01-01 RX ADMIN — ARFORMOTEROL TARTRATE 15 MCG: 15 SOLUTION RESPIRATORY (INHALATION) at 20:07

## 2017-01-01 RX ADMIN — CEFTRIAXONE SODIUM 1 G: 1 INJECTION, SOLUTION INTRAVENOUS at 04:07

## 2017-01-01 RX ADMIN — FUROSEMIDE 20 MG: 10 INJECTION, SOLUTION INTRAMUSCULAR; INTRAVENOUS at 17:15

## 2017-01-01 RX ADMIN — BUDESONIDE 0.5 MG: 0.5 INHALANT RESPIRATORY (INHALATION) at 20:25

## 2017-01-01 RX ADMIN — METHYLPREDNISOLONE SODIUM SUCCINATE 80 MG: 125 INJECTION, POWDER, FOR SOLUTION INTRAMUSCULAR; INTRAVENOUS at 10:08

## 2017-01-01 RX ADMIN — HEPARIN SODIUM 5000 UNITS: 5000 INJECTION, SOLUTION INTRAVENOUS; SUBCUTANEOUS at 08:57

## 2017-01-01 RX ADMIN — ARFORMOTEROL TARTRATE 15 MCG: 15 SOLUTION RESPIRATORY (INHALATION) at 08:59

## 2017-01-01 RX ADMIN — METHYLPREDNISOLONE SODIUM SUCCINATE 80 MG: 125 INJECTION, POWDER, FOR SOLUTION INTRAMUSCULAR; INTRAVENOUS at 10:29

## 2017-01-01 RX ADMIN — BUSPIRONE HYDROCHLORIDE 7.5 MG: 15 TABLET ORAL at 17:04

## 2017-01-01 RX ADMIN — ARFORMOTEROL TARTRATE 15 MCG: 15 SOLUTION RESPIRATORY (INHALATION) at 20:22

## 2017-01-01 RX ADMIN — IPRATROPIUM BROMIDE AND ALBUTEROL SULFATE 3 ML: .5; 3 SOLUTION RESPIRATORY (INHALATION) at 11:13

## 2017-01-01 RX ADMIN — CALCIUM CARBONATE-VITAMIN D TAB 500 MG-200 UNIT 1000 MG: 500-200 TAB at 17:15

## 2017-01-01 RX ADMIN — FLUTICASONE PROPIONATE 2 SPRAY: 50 SPRAY, METERED NASAL at 08:52

## 2017-01-01 RX ADMIN — NYSTATIN: 100000 POWDER TOPICAL at 10:34

## 2017-01-01 RX ADMIN — BUSPIRONE HYDROCHLORIDE 5 MG: 5 TABLET ORAL at 14:33

## 2017-01-01 RX ADMIN — METHYLPREDNISOLONE SODIUM SUCCINATE 80 MG: 125 INJECTION, POWDER, FOR SOLUTION INTRAMUSCULAR; INTRAVENOUS at 01:53

## 2017-01-01 RX ADMIN — HEPARIN SODIUM 5000 UNITS: 5000 INJECTION, SOLUTION INTRAVENOUS; SUBCUTANEOUS at 21:35

## 2017-01-01 RX ADMIN — ARFORMOTEROL TARTRATE 15 MCG: 15 SOLUTION RESPIRATORY (INHALATION) at 22:02

## 2017-01-01 RX ADMIN — ARFORMOTEROL TARTRATE 15 MCG: 15 SOLUTION RESPIRATORY (INHALATION) at 20:25

## 2017-01-01 RX ADMIN — HEPARIN SODIUM 5000 UNITS: 5000 INJECTION, SOLUTION INTRAVENOUS; SUBCUTANEOUS at 08:05

## 2017-01-01 RX ADMIN — BUDESONIDE 0.5 MG: 0.5 INHALANT RESPIRATORY (INHALATION) at 23:09

## 2017-01-01 RX ADMIN — Medication 1 CAPSULE: at 08:20

## 2017-01-01 RX ADMIN — PANTOPRAZOLE SODIUM 40 MG: 40 TABLET, DELAYED RELEASE ORAL at 05:40

## 2017-01-01 RX ADMIN — IPRATROPIUM BROMIDE AND ALBUTEROL SULFATE 3 ML: .5; 3 SOLUTION RESPIRATORY (INHALATION) at 03:14

## 2017-01-01 RX ADMIN — CEFTRIAXONE SODIUM 1 G: 1 INJECTION, SOLUTION INTRAVENOUS at 02:48

## 2017-01-01 RX ADMIN — FUROSEMIDE 40 MG: 10 INJECTION, SOLUTION INTRAMUSCULAR; INTRAVENOUS at 14:34

## 2017-01-01 RX ADMIN — BUDESONIDE 0.5 MG: 0.5 INHALANT RESPIRATORY (INHALATION) at 08:36

## 2017-01-01 RX ADMIN — BUDESONIDE 0.5 MG: 0.5 INHALANT RESPIRATORY (INHALATION) at 21:18

## 2017-01-01 RX ADMIN — BUSPIRONE HYDROCHLORIDE 7.5 MG: 15 TABLET ORAL at 18:25

## 2017-01-01 RX ADMIN — HEPARIN SODIUM 5000 UNITS: 5000 INJECTION, SOLUTION INTRAVENOUS; SUBCUTANEOUS at 09:55

## 2017-01-01 RX ADMIN — METOPROLOL SUCCINATE 150 MG: 100 TABLET, FILM COATED, EXTENDED RELEASE ORAL at 09:51

## 2017-01-01 RX ADMIN — PANTOPRAZOLE SODIUM 40 MG: 40 TABLET, DELAYED RELEASE ORAL at 09:27

## 2017-01-01 RX ADMIN — IPRATROPIUM BROMIDE AND ALBUTEROL SULFATE 3 ML: .5; 3 SOLUTION RESPIRATORY (INHALATION) at 07:12

## 2017-01-01 RX ADMIN — DIGOXIN 125 MCG: 0.12 TABLET ORAL at 12:25

## 2017-01-01 RX ADMIN — CEFTRIAXONE SODIUM 1 G: 1 INJECTION, SOLUTION INTRAVENOUS at 00:54

## 2017-01-01 RX ADMIN — PANTOPRAZOLE SODIUM 40 MG: 40 TABLET, DELAYED RELEASE ORAL at 06:40

## 2017-01-01 RX ADMIN — LORAZEPAM 0.25 MG: 0.5 TABLET ORAL at 09:45

## 2017-01-01 RX ADMIN — HEPARIN SODIUM 5000 UNITS: 5000 INJECTION, SOLUTION INTRAVENOUS; SUBCUTANEOUS at 08:38

## 2017-01-01 RX ADMIN — CALCIUM CARBONATE-VITAMIN D TAB 500 MG-200 UNIT 1000 MG: 500-200 TAB at 08:57

## 2017-01-01 RX ADMIN — METHYLPREDNISOLONE SODIUM SUCCINATE 80 MG: 125 INJECTION, POWDER, FOR SOLUTION INTRAMUSCULAR; INTRAVENOUS at 09:42

## 2017-01-01 RX ADMIN — CALCIUM CARBONATE-VITAMIN D TAB 500 MG-200 UNIT 1000 MG: 500-200 TAB at 10:25

## 2017-01-01 RX ADMIN — PREDNISONE 40 MG: 20 TABLET ORAL at 08:57

## 2017-01-01 RX ADMIN — FLUTICASONE PROPIONATE 2 SPRAY: 50 SPRAY, METERED NASAL at 08:05

## 2017-01-01 RX ADMIN — PANTOPRAZOLE SODIUM 40 MG: 40 TABLET, DELAYED RELEASE ORAL at 21:14

## 2017-01-01 RX ADMIN — DIGOXIN 125 MCG: 0.12 TABLET ORAL at 12:01

## 2017-01-01 RX ADMIN — ARFORMOTEROL TARTRATE 15 MCG: 15 SOLUTION RESPIRATORY (INHALATION) at 23:33

## 2017-01-01 RX ADMIN — DIGOXIN 125 MCG: 0.12 TABLET ORAL at 11:19

## 2017-01-01 RX ADMIN — TAMSULOSIN HYDROCHLORIDE 0.4 MG: 0.4 CAPSULE ORAL at 09:27

## 2017-01-01 RX ADMIN — IPRATROPIUM BROMIDE AND ALBUTEROL SULFATE 3 ML: .5; 3 SOLUTION RESPIRATORY (INHALATION) at 03:53

## 2017-01-01 RX ADMIN — DIGOXIN 125 MCG: 0.12 TABLET ORAL at 11:42

## 2017-01-01 RX ADMIN — BUSPIRONE HYDROCHLORIDE 7.5 MG: 15 TABLET ORAL at 09:46

## 2017-01-01 RX ADMIN — ARFORMOTEROL TARTRATE 15 MCG: 15 SOLUTION RESPIRATORY (INHALATION) at 23:15

## 2017-01-01 RX ADMIN — HEPARIN SODIUM 5000 UNITS: 5000 INJECTION, SOLUTION INTRAVENOUS; SUBCUTANEOUS at 20:09

## 2017-01-01 RX ADMIN — BUSPIRONE HYDROCHLORIDE 7.5 MG: 15 TABLET ORAL at 21:42

## 2017-01-01 RX ADMIN — AZITHROMYCIN DIHYDRATE 500 MG: 500 INJECTION, POWDER, LYOPHILIZED, FOR SOLUTION INTRAVENOUS at 01:12

## 2017-01-01 RX ADMIN — CALCIUM CARBONATE-VITAMIN D TAB 500 MG-200 UNIT 1000 MG: 500-200 TAB at 21:08

## 2017-01-01 RX ADMIN — CALCIUM CARBONATE-VITAMIN D TAB 500 MG-200 UNIT 1000 MG: 500-200 TAB at 09:18

## 2017-01-01 RX ADMIN — ASPIRIN 81 MG: 81 TABLET ORAL at 09:09

## 2017-01-01 RX ADMIN — CALCIUM CARBONATE-VITAMIN D TAB 500 MG-200 UNIT 1000 MG: 500-200 TAB at 08:05

## 2017-01-01 RX ADMIN — IPRATROPIUM BROMIDE AND ALBUTEROL SULFATE 3 ML: .5; 3 SOLUTION RESPIRATORY (INHALATION) at 18:56

## 2017-01-01 RX ADMIN — ARFORMOTEROL TARTRATE 15 MCG: 15 SOLUTION RESPIRATORY (INHALATION) at 21:41

## 2017-01-01 RX ADMIN — PREDNISONE 20 MG: 20 TABLET ORAL at 14:33

## 2017-01-01 RX ADMIN — NYSTATIN: 100000 POWDER TOPICAL at 20:51

## 2017-01-01 RX ADMIN — OXYMETAZOLINE HYDROCHLORIDE 2 SPRAY: 5 SPRAY NASAL at 21:14

## 2017-01-01 RX ADMIN — ARFORMOTEROL TARTRATE 15 MCG: 15 SOLUTION RESPIRATORY (INHALATION) at 07:23

## 2017-01-01 RX ADMIN — BUSPIRONE HYDROCHLORIDE 7.5 MG: 15 TABLET ORAL at 08:38

## 2017-01-01 RX ADMIN — FUROSEMIDE 40 MG: 40 TABLET ORAL at 08:47

## 2017-01-01 RX ADMIN — NYSTATIN: 100000 POWDER TOPICAL at 09:18

## 2017-01-01 RX ADMIN — ARFORMOTEROL TARTRATE 15 MCG: 15 SOLUTION RESPIRATORY (INHALATION) at 09:31

## 2017-01-01 RX ADMIN — METHYLPREDNISOLONE SODIUM SUCCINATE 80 MG: 125 INJECTION, POWDER, FOR SOLUTION INTRAMUSCULAR; INTRAVENOUS at 03:00

## 2017-01-01 RX ADMIN — NYSTATIN: 100000 POWDER TOPICAL at 20:15

## 2017-01-01 RX ADMIN — HEPARIN SODIUM 5000 UNITS: 5000 INJECTION, SOLUTION INTRAVENOUS; SUBCUTANEOUS at 08:22

## 2017-01-01 RX ADMIN — NYSTATIN: 100000 POWDER TOPICAL at 11:19

## 2017-01-01 RX ADMIN — METHYLPREDNISOLONE SODIUM SUCCINATE 80 MG: 125 INJECTION, POWDER, FOR SOLUTION INTRAMUSCULAR; INTRAVENOUS at 03:24

## 2017-01-01 RX ADMIN — IPRATROPIUM BROMIDE AND ALBUTEROL SULFATE 3 ML: .5; 3 SOLUTION RESPIRATORY (INHALATION) at 03:17

## 2017-01-01 RX ADMIN — PANTOPRAZOLE SODIUM 40 MG: 40 TABLET, DELAYED RELEASE ORAL at 05:55

## 2017-01-01 RX ADMIN — PANTOPRAZOLE SODIUM 40 MG: 40 TABLET, DELAYED RELEASE ORAL at 07:43

## 2017-01-01 RX ADMIN — HEPARIN SODIUM 5000 UNITS: 5000 INJECTION, SOLUTION INTRAVENOUS; SUBCUTANEOUS at 09:09

## 2017-01-01 RX ADMIN — IPRATROPIUM BROMIDE AND ALBUTEROL SULFATE 3 ML: .5; 3 SOLUTION RESPIRATORY (INHALATION) at 04:02

## 2017-01-01 RX ADMIN — BUSPIRONE HYDROCHLORIDE 7.5 MG: 15 TABLET ORAL at 09:37

## 2017-01-01 RX ADMIN — ASPIRIN 325 MG: 325 TABLET, DELAYED RELEASE ORAL at 09:45

## 2017-01-01 RX ADMIN — METOPROLOL SUCCINATE 100 MG: 100 TABLET, FILM COATED, EXTENDED RELEASE ORAL at 08:04

## 2017-01-01 RX ADMIN — CEFTRIAXONE SODIUM 1 G: 1 INJECTION, SOLUTION INTRAVENOUS at 01:11

## 2017-01-01 RX ADMIN — DOXYCYCLINE 100 MG: 100 INJECTION, POWDER, LYOPHILIZED, FOR SOLUTION INTRAVENOUS at 02:37

## 2017-01-01 RX ADMIN — IPRATROPIUM BROMIDE AND ALBUTEROL SULFATE 3 ML: .5; 3 SOLUTION RESPIRATORY (INHALATION) at 20:43

## 2017-01-01 RX ADMIN — CALCIUM CARBONATE-VITAMIN D TAB 500 MG-200 UNIT 1000 MG: 500-200 TAB at 08:47

## 2017-01-01 RX ADMIN — CALCIUM CARBONATE-VITAMIN D TAB 500 MG-200 UNIT 1000 MG: 500-200 TAB at 08:52

## 2017-01-01 RX ADMIN — BUSPIRONE HYDROCHLORIDE 5 MG: 5 TABLET ORAL at 09:51

## 2017-01-01 RX ADMIN — FUROSEMIDE 40 MG: 10 INJECTION, SOLUTION INTRAMUSCULAR; INTRAVENOUS at 17:16

## 2017-01-01 RX ADMIN — ASPIRIN 81 MG: 81 TABLET ORAL at 08:47

## 2017-01-01 RX ADMIN — BUSPIRONE HYDROCHLORIDE 5 MG: 5 TABLET ORAL at 18:01

## 2017-01-01 RX ADMIN — FUROSEMIDE 20 MG: 10 INJECTION, SOLUTION INTRAMUSCULAR; INTRAVENOUS at 06:16

## 2017-01-01 RX ADMIN — METHYLPREDNISOLONE SODIUM SUCCINATE 80 MG: 125 INJECTION, POWDER, FOR SOLUTION INTRAMUSCULAR; INTRAVENOUS at 18:09

## 2017-01-01 RX ADMIN — BUDESONIDE 0.5 MG: 0.5 INHALANT RESPIRATORY (INHALATION) at 09:42

## 2017-01-01 RX ADMIN — FLUTICASONE PROPIONATE 2 SPRAY: 50 SPRAY, METERED NASAL at 08:45

## 2017-01-01 RX ADMIN — NYSTATIN 1 APPLICATION: 100000 POWDER TOPICAL at 20:10

## 2017-01-01 RX ADMIN — NYSTATIN: 100000 POWDER TOPICAL at 21:15

## 2017-01-01 RX ADMIN — BUDESONIDE 0.5 MG: 0.5 INHALANT RESPIRATORY (INHALATION) at 11:03

## 2017-01-01 RX ADMIN — FUROSEMIDE 40 MG: 40 TABLET ORAL at 08:21

## 2017-01-01 RX ADMIN — POTASSIUM CHLORIDE 40 MEQ: 750 CAPSULE, EXTENDED RELEASE ORAL at 16:44

## 2017-01-01 RX ADMIN — FLUTICASONE PROPIONATE 2 SPRAY: 50 SPRAY, METERED NASAL at 10:34

## 2017-01-01 RX ADMIN — PREDNISONE 20 MG: 20 TABLET ORAL at 09:25

## 2017-01-01 RX ADMIN — ARFORMOTEROL TARTRATE 15 MCG: 15 SOLUTION RESPIRATORY (INHALATION) at 08:36

## 2017-01-01 RX ADMIN — IPRATROPIUM BROMIDE AND ALBUTEROL SULFATE 3 ML: .5; 3 SOLUTION RESPIRATORY (INHALATION) at 19:00

## 2017-01-01 RX ADMIN — NYSTATIN: 100000 POWDER TOPICAL at 08:55

## 2017-01-01 RX ADMIN — PANTOPRAZOLE SODIUM 40 MG: 40 TABLET, DELAYED RELEASE ORAL at 08:42

## 2017-01-01 RX ADMIN — BUDESONIDE 0.5 MG: 0.5 INHALANT RESPIRATORY (INHALATION) at 09:51

## 2017-01-01 RX ADMIN — NYSTATIN: 100000 POWDER TOPICAL at 12:02

## 2017-01-01 RX ADMIN — ASPIRIN 81 MG: 81 TABLET ORAL at 09:37

## 2017-01-01 RX ADMIN — METHYLPREDNISOLONE SODIUM SUCCINATE 80 MG: 125 INJECTION, POWDER, FOR SOLUTION INTRAMUSCULAR; INTRAVENOUS at 18:47

## 2017-01-01 RX ADMIN — NYSTATIN: 100000 POWDER TOPICAL at 09:55

## 2017-01-01 RX ADMIN — BUDESONIDE 0.5 MG: 0.5 INHALANT RESPIRATORY (INHALATION) at 21:08

## 2017-01-01 RX ADMIN — DIGOXIN 125 MCG: 0.12 TABLET ORAL at 14:37

## 2017-01-01 RX ADMIN — FUROSEMIDE 20 MG: 10 INJECTION, SOLUTION INTRAMUSCULAR; INTRAVENOUS at 17:30

## 2017-01-01 RX ADMIN — BUDESONIDE 0.5 MG: 0.5 INHALANT RESPIRATORY (INHALATION) at 20:06

## 2017-01-01 RX ADMIN — ASPIRIN 81 MG: 81 TABLET ORAL at 08:38

## 2017-01-01 RX ADMIN — FUROSEMIDE 40 MG: 40 TABLET ORAL at 09:27

## 2017-01-12 PROBLEM — R14.0 ABDOMINAL BLOATING: Status: ACTIVE | Noted: 2017-01-01

## 2017-01-12 NOTE — MR AVS SNAPSHOT
Pooja Duque   1/12/2017 3:00 PM   Office Visit    Dept Phone:  [phone]   Encounter #:  [id number]    Provider:  Justin Jordan MD   Department:  [facility] INTERNAL MEDICINE                Your Full Care Plan              Your Updated Medication List          This list is accurate as of: 1/12/17  3:52 PM.  Always use your most recent med list.                * aspirin 325 MG tablet   Take 1 tablet by mouth daily.       * aspirin  MG tablet   TAKE 1 TABLET BY MOUTH DAILY       BROVANA 15 MCG/2ML nebulizer solution   Generic drug:  arformoterol   INHALE 1 VIAL VIA NEBULIZER TWO TIMES A DAY       budesonide 0.5 MG/2ML nebulizer solution   Commonly known as:  PULMICORT       calcium carbonate-vitamin d 600-400 MG-UNIT per tablet   TAKE 1 TABLET BY MOUTH DAILY       CENTRUM SILVER tablet       CITRACAL + D PO       furosemide 40 MG tablet   Commonly known as:  LASIX   TAKE 1 TABLET BY MOUTH DAILY       metoprolol succinate  MG 24 hr tablet   Commonly known as:  TOPROL-XL   TAKE 1 TABLET BY MOUTH DAILY       ONE DAILY tablet   TAKE ONE TABLET BY MOUTH DAILY DO NOT TAKE WITH LEVOTHRYOXIN MUST TAKE 2 HOURS APART       pantoprazole 40 MG EC tablet   Commonly known as:  PROTONIX   @@ TAKE 1 TABLET BY MOUTH DAILY       PROVENTIL  (90 BASE) MCG/ACT inhaler   Generic drug:  albuterol       * Notice:  This list has 2 medication(s) that are the same as other medications prescribed for you. Read the directions carefully, and ask your doctor or other care provider to review them with you.            We Performed the Following     Hepatic Function Panel     T4, Free     TSH       You Were Diagnosed With        Codes Comments    Essential hypertension    -  Primary ICD-10-CM: I10  ICD-9-CM: 401.9     Abdominal bloating     ICD-10-CM: R14.0  ICD-9-CM: 787.3     Atherosclerosis of native coronary artery of native heart without angina pectoris     ICD-10-CM:  "I25.10  ICD-9-CM: 414.01     COPD, severe     ICD-10-CM: J44.9  ICD-9-CM: 496     Chronic bilateral low back pain without sciatica     ICD-10-CM: M54.5, G89.29  ICD-9-CM: 724.2, 338.29     On home O2     ICD-10-CM: Z99.81  ICD-9-CM: V46.2     Chronic diastolic heart failure     ICD-10-CM: I50.32  ICD-9-CM: 428.32       Instructions     None    Patient Instructions History      Upcoming Appointments     Visit Type Date Time Department    OFFICE VISIT 1/12/2017  3:00 PM [facility] [id number]      Swif[employer]hart Signup     Our records indicate that you have declined [employer] signup. If you would like to sign up for Tip or Skip, please email [email or web address] or call [phone] to obtain an activation code.             Other Info from Your Visit           Allergies     Codeine      Morphine And Related      Pravastatin      Patient denies      Vital Signs     Blood Pressure Pulse Temperature Height Weight Oxygen Saturation    119/65 (BP Location: Left arm, Patient Position: Sitting, Cuff Size: Adult) 75 98.7 °F (37.1 °C) (Tympanic) 60\" (152.4 cm) 202 lb (91.6 kg) 92%    Body Mass Index Smoking Status                39.45 kg/m2 Former Smoker          Problems and Diagnoses Noted     Abdominal bloating    Heart disease due to blocked artery    High blood pressure    Heart failure    Chronic low back pain    COPD, severe    On home O2        "

## 2017-01-16 NOTE — TELEPHONE ENCOUNTER
PT'S DAUGHTER CALLED AND SAID THAT PT IS HAVING A HARD TIME EMPTYING HER BLADDER. YOU TOLD HER YOU WERE GOING TO PRESCRIBE A MEDICATION TO HELP BUT SHE HAS NOT RECEIVED ANYTHING FOR IT

## 2017-01-20 NOTE — TELEPHONE ENCOUNTER
S/W PT'S DAUGHTER REGARDING MESSAGE. PT'S DAUGHTER WAS ADVISED THAT PT WAS REFERRED TO UROLOGY FOR URINARY RETENTION

## 2017-01-21 NOTE — PROGRESS NOTES
Subjective   Pooja Duque is a 85 y.o. female.   She is here today for hypertension abdominal bloating native CAD along with severe COPD low back pain and on home oxygen and chronic diastolic heart failure and with all that she has no new complaints besides abdominal bloating  History of Present Illness   She is here today for hypertension native CAD along with severe COPD requiring home O2 along with low back pain and chronic diastolic heart failure and she has significant abdominal bloating she thinks  The following portions of the patient's history were reviewed and updated as appropriate: allergies, current medications, past family history, past medical history, past social history, past surgical history and problem list.    Review of Systems   Gastrointestinal: Positive for abdominal distention.   Musculoskeletal: Positive for back pain.   All other systems reviewed and are negative.      Objective   Physical Exam   Constitutional: She is oriented to person, place, and time. She appears well-developed and well-nourished. She is cooperative.   HENT:   Head: Normocephalic and atraumatic.   Right Ear: Tympanic membrane and external ear normal.   Left Ear: Tympanic membrane and external ear normal.   Nose: Nose normal.   Mouth/Throat: Oropharynx is clear and moist.   Eyes: Conjunctivae, EOM and lids are normal. Pupils are equal, round, and reactive to light.   Neck: Phonation normal. Neck supple. Carotid bruit is not present.   Cardiovascular: Normal rate, regular rhythm and normal heart sounds.  Exam reveals no gallop and no friction rub.    No murmur heard.  Pulmonary/Chest: Effort normal and breath sounds normal. No respiratory distress.   Abdominal: Soft. Bowel sounds are normal. She exhibits distension (mild bloating). She exhibits no mass. There is no hepatosplenomegaly. There is no tenderness. There is no rebound and no guarding. No hernia.   Musculoskeletal: She exhibits no edema.   Neurological: She  is alert and oriented to person, place, and time. Coordination and gait normal.   Skin: Skin is warm and dry.   Psychiatric: She has a normal mood and affect. Her speech is normal and behavior is normal. Judgment and thought content normal.   Nursing note and vitals reviewed.      Assessment/Plan   Diagnoses and all orders for this visit:    Essential hypertension  -     Hepatic Function Panel  -     T4, Free  -     TSH    Abdominal bloating  -     Hepatic Function Panel  -     T4, Free  -     TSH  -     Cancel: US Abdomen Complete; Future    Atherosclerosis of native coronary artery of native heart without angina pectoris  -     Hepatic Function Panel  -     T4, Free  -     TSH    COPD, severe  -     Hepatic Function Panel  -     T4, Free  -     TSH    Chronic bilateral low back pain without sciatica  -     Hepatic Function Panel  -     T4, Free  -     TSH    On home O2  -     Hepatic Function Panel  -     T4, Free  -     TSH    Chronic diastolic heart failure      Hypertension well-controlled on current medication  Abdominal bloating we will check labs  CAD no evidence of chest pain or anginal equivalents  Severe COPD continue oxygen  Bilateral low back pain supportive meds  On home O2 continue  Chronic diastolic heart failure follow with medication as needed

## 2017-07-13 PROBLEM — Z00.00 MEDICARE ANNUAL WELLNESS VISIT, INITIAL: Status: ACTIVE | Noted: 2017-01-01

## 2017-07-13 PROBLEM — Z99.3 WHEELCHAIR BOUND: Status: ACTIVE | Noted: 2017-01-01

## 2017-07-13 NOTE — PROGRESS NOTES
QUICK REFERENCE INFORMATION:  The ABCs of the Annual Wellness Visit    Initial Medicare Wellness Visit    HEALTH RISK ASSESSMENT    11/27/1931    Recent Hospitalizations:  Recently treated at the following:  [facility].        Current Medical Providers:  Patient Care Team:  Justin Jordan MD as PCP - General  Justin Jordan MD as PCP - Family Medicine  Kofi Guevara MD as PCP - Claims Attributed        Smoking Status:  History   Smoking Status   • Former Smoker   Smokeless Tobacco   • Not on file       Alcohol Consumption:  History   Alcohol use Not on file       Depression Screen:   PHQ-9 Depression Screening 7/13/2017   Little interest or pleasure in doing things 0   Feeling down, depressed, or hopeless 0   PHQ-9 Total Score 0       Health Habits and Functional and Cognitive Screening:  Functional & Cognitive Status 7/13/2017   Do you have difficulty preparing food and eating? Yes   Do you have difficulty bathing yourself? No   Do you have difficulty getting dressed? No   Do you have difficulty using the toilet? No   Do you have difficulty moving around from place to place? Yes   In the past year have you fallen or experienced a near fall? Yes   Do you need help using the phone?  No   Are you deaf or do you have serious difficulty hearing?  No   Do you need help with transportation? Yes   Do you need help shopping? Yes   Do you need help preparing meals?  Yes   Do you need help with housework?  Yes   Do you need help with laundry? Yes   Do you need help taking your medications? No   Do you need help managing money? No   Do you have difficulty concentrating, remembering or making decisions? No       Health Habits  Current Diet: Well Balanced Diet  Dental Exam: Up to date  Eye Exam: Up to date  Exercise (times per week): 0 times per week  Current Exercise Activities Include: None          Does the patient have evidence of cognitive impairment? No    Asiprin use counseling: Taking ASA  appropriately as indicated      Recent Lab Results:    Visual Acuity:  No exam data present    Age-appropriate Screening Schedule:  Refer to the list below for future screening recommendations based on patient's age, sex and/or medical conditions. Orders for these recommended tests are listed in the plan section. The patient has been provided with a written plan.    Health Maintenance   Topic Date Due   • TDAP/TD VACCINES (1 - Tdap) [birth year]/27/1950   • ZOSTER VACCINE  02/01/2016   • MAMMOGRAM  04/06/2017   • INFLUENZA VACCINE  08/01/2017   • DXA SCAN  10/20/2018   • PNEUMOCOCCAL VACCINES (65+ LOW/MEDIUM RISK)  Addressed        Subjective   History of Present Illness    Pooja Duque is a 85 y.o. female who presents for an Annual Wellness Visit.    The following portions of the patient's history were reviewed and updated as appropriate: allergies, current medications, past family history, past medical history, past social history, past surgical history and problem list.    Outpatient Medications Prior to Visit   Medication Sig Dispense Refill   • albuterol (PROVENTIL HFA) 108 (90 BASE) MCG/ACT inhaler Inhale 2 puffs Every 4 (Four) Hours As Needed for wheezing.     • aspirin  MG tablet TAKE 1 TABLET BY MOUTH DAILY 30 tablet 2   • BROVANA 15 MCG/2ML nebulizer solution INHALE 1 VIAL VIA NEBULIZER TWO TIMES A  mL 2   • budesonide (PULMICORT) 0.5 MG/2ML nebulizer solution USE 1 VIAL PER NEBULIZER TWO TIMES A DAY (MAY MIX WITH BROVANA IN THE NEBULIZER) 120 mL 1   • calcium carbonate-vitamin d 600-400 MG-UNIT per tablet TAKE 1 TABLET BY MOUTH DAILY 30 tablet 2   • furosemide (LASIX) 40 MG tablet TAKE 1 TABLET BY MOUTH DAILY 30 tablet 2   • metoprolol succinate XL (TOPROL-XL) 100 MG 24 hr tablet TAKE 1 TABLET BY MOUTH DAILY 30 tablet 2   • Multiple Vitamin (ONE DAILY) tablet TAKE ONE TABLET BY MOUTH DAILY DO NOT TAKE WITH LEVOTHRYOXIN MUST TAKE 2 HOURS APART 30 tablet 2   • Multiple Vitamins-Minerals (CENTRUM  "SILVER) tablet Take by mouth.     • pantoprazole (PROTONIX) 40 MG EC tablet @@ TAKE 1 TABLET BY MOUTH DAILY 30 tablet 2     No facility-administered medications prior to visit.        Patient Active Problem List   Diagnosis   • Abnormal weight gain   • Acute upper respiratory infection   • [place] esophagus   • Benign colonic polyp   • Atherosclerosis of coronary artery   • COPD, severe   • Chest pressure   • Chronic diastolic heart failure   • CN (constipation)   • Acute exacerbation of chronic obstructive airways disease   • Cough   • Bleeding from the nose   • Bloodgood disease   • BP (high blood pressure)   • Hypoxia   • Eczema intertrigo   • Leg cramp   • Chronic low back pain   • Muscle spasms of neck   • OP (osteoporosis)   • Left leg pain   • PND (post-nasal drip)   • Right lower lobe pneumonia   • Basal cell papilloma   • Superficial thrombophlebitis of leg   • On home O2   • Intractable vomiting with nausea   • Dehydration   • Pain and swelling of right lower leg   • Cellulitis of left leg   • Left leg cellulitis   • Chronic respiratory failure with hypoxia   • Hospital discharge follow-up   • Bilateral leg edema   • Anemia   • Abdominal bloating   • Medicare annual wellness visit, initial       Advance Care Planning:  has NO advance directive - not interested in additional information    Identification of Risk Factors:  Risk factors include: cardiovascular risk and increased fall risk.    Review of Systems    Compared to one year ago, the patient feels her physical health is the same.  Compared to one year ago, the patient feels her mental health is the same.    Objective     Physical Exam    Vitals:    07/13/17 1444   BP: 117/69   BP Location: Left arm   Patient Position: Sitting   Cuff Size: Small Adult   Pulse: 82   Resp: 16   Temp: 97.4 °F (36.3 °C)   TempSrc: Oral   SpO2: (!) 85%   Weight: 203 lb (92.1 kg)   Height: 60\" (152.4 cm)   PainSc: 0-No pain       Body mass index is 39.65 " kg/(m^2).  Discussed the patient's BMI with her. The BMI is above average; BMI management plan is completed.    Assessment/Plan   Patient Self-Management and Personalized Health Advice  The patient has been provided with information about: weight management, prevention of cardiac or vascular disease and fall prevention and preventive services including:   · Counseling for cardiovascular disease risk reduction, Fall Risk plan of care done.    Visit Diagnoses:    ICD-10-CM ICD-9-CM   1. Medicare annual wellness visit, initial Z00.00 V70.0   2. Atherosclerosis of native coronary artery of native heart without angina pectoris I25.10 414.01   3. Bilateral leg edema R60.0 782.3   4. OP (osteoporosis) M81.0 733.00   5. Chronic diastolic heart failure I50.32 428.32   6. Essential hypertension I10 401.9   7. COPD, severe J44.9 496   8. Hypoxia R09.02 799.02   9. On home O2 Z99.81 V46.2       No orders of the defined types were placed in this encounter.      Outpatient Encounter Prescriptions as of 7/13/2017   Medication Sig Dispense Refill   • albuterol (PROVENTIL HFA) 108 (90 BASE) MCG/ACT inhaler Inhale 2 puffs Every 4 (Four) Hours As Needed for wheezing.     • aspirin  MG tablet TAKE 1 TABLET BY MOUTH DAILY 30 tablet 2   • BROVANA 15 MCG/2ML nebulizer solution INHALE 1 VIAL VIA NEBULIZER TWO TIMES A  mL 2   • budesonide (PULMICORT) 0.5 MG/2ML nebulizer solution USE 1 VIAL PER NEBULIZER TWO TIMES A DAY (MAY MIX WITH BROVANA IN THE NEBULIZER) 120 mL 1   • calcium carbonate-vitamin d 600-400 MG-UNIT per tablet TAKE 1 TABLET BY MOUTH DAILY 30 tablet 2   • furosemide (LASIX) 40 MG tablet TAKE 1 TABLET BY MOUTH DAILY 30 tablet 2   • metoprolol succinate XL (TOPROL-XL) 100 MG 24 hr tablet TAKE 1 TABLET BY MOUTH DAILY 30 tablet 2   • Multiple Vitamin (ONE DAILY) tablet TAKE ONE TABLET BY MOUTH DAILY DO NOT TAKE WITH LEVOTHRYOXIN MUST TAKE 2 HOURS APART 30 tablet 2   • Multiple Vitamins-Minerals (CENTRUM SILVER)  tablet Take by mouth.     • pantoprazole (PROTONIX) 40 MG EC tablet @@ TAKE 1 TABLET BY MOUTH DAILY 30 tablet 2     No facility-administered encounter medications on file as of 7/13/2017.        Reviewed use of high risk medication in the elderly: yes  Reviewed for potential of harmful drug interactions in the elderly: yes    Follow Up:  No Follow-up on file.     An After Visit Summary and PPPS with all of these plans were given to the patient.

## 2017-07-13 NOTE — PATIENT INSTRUCTIONS
Medicare Wellness  Personal Prevention Plan of Service     Date of Office Visit:  2017  Encounter Provider:  Justin Jordan MD  Place of Service:  [facility] INTERNAL MEDICINE  Patient Name: Pooja Duque  :  1931    As part of the Medicare Wellness portion of your visit today, we are providing you with this personalized preventive plan of services (PPPS). This plan is based upon recommendations of the [place] Preventive Services Task Force (USPSTF) and the Advisory Committee on Immunization Practices (ACIP).    This lists the preventive care services that should be considered, and provides dates of when you are due. Items listed as completed are up-to-date and do not require any further intervention.    Health Maintenance   Topic Date Due   • TDAP/TD VACCINES (1 - Tdap) 1950   • ZOSTER VACCINE  2016   • MAMMOGRAM  2017   • INFLUENZA VACCINE  2017   • MEDICARE ANNUAL WELLNESS  2018   • DXA SCAN  10/20/2018   • PNEUMOCOCCAL VACCINES (65+ LOW/MEDIUM RISK)  Addressed       Orders Placed This Encounter   Procedures   • Ambulatory Referral to Home Health     Referral Priority:   Routine     Referral Type:   Home Health     Referral Reason:   Specialty Services Required     Requested Specialty:   Home Health Services     Number of Visits Requested:   1       No Follow-up on file.

## 2017-07-23 NOTE — PROGRESS NOTES
Subjective   Pooja Duque is a 85 y.o. female.   She is here today for Medicare annual wellness visit initial along with native CAD bilateral leg edema osteoporosis chronic diastolic heart failure hypertension COPD hypoxia on home O2 wheelchair-bound  History of Present Illness   She is here today for Medicare annual wellness visit initial along with native CAD lateral leg edema osteoporosis chronic diastolic heart failure hypertension COPD hypoxia and on home O2 and wheelchair-bound  The following portions of the patient's history were reviewed and updated as appropriate: allergies, current medications, past family history, past medical history, past social history, past surgical history and problem list.    Review of Systems   Cardiovascular: Positive for leg swelling.   Neurological: Positive for weakness (legs).   All other systems reviewed and are negative.      Objective   Physical Exam   Constitutional: She is oriented to person, place, and time. She appears well-developed and well-nourished. She is cooperative.   HENT:   Head: Normocephalic and atraumatic.   Right Ear: Hearing, tympanic membrane, external ear and ear canal normal.   Left Ear: Hearing, tympanic membrane, external ear and ear canal normal.   Nose: Nose normal.   Mouth/Throat: Uvula is midline, oropharynx is clear and moist and mucous membranes are normal.   Eyes: Conjunctivae, EOM and lids are normal. Pupils are equal, round, and reactive to light.   Neck: Phonation normal. Neck supple. Carotid bruit is not present.   Cardiovascular: Normal rate, regular rhythm and normal heart sounds.  Exam reveals no gallop and no friction rub.    No murmur heard.  Pulmonary/Chest: Effort normal and breath sounds normal. No respiratory distress.   Abdominal: Soft. Bowel sounds are normal. She exhibits no distension and no mass. There is no hepatosplenomegaly. There is no tenderness. There is no rebound and no guarding. No hernia.   Musculoskeletal:  She exhibits edema (both lower extremities).   Neurological: She is alert and oriented to person, place, and time. Coordination and gait normal.   Skin: Skin is warm and dry.   Psychiatric: She has a normal mood and affect. Her speech is normal and behavior is normal. Judgment and thought content normal.   Nursing note and vitals reviewed.      Assessment/Plan   Diagnoses and all orders for this visit:    Medicare annual wellness visit, initial  -     CBC & Differential  -     Comprehensive Metabolic Panel  -     T4, Free  -     TSH    Atherosclerosis of native coronary artery of native heart without angina pectoris  -     CBC & Differential  -     Comprehensive Metabolic Panel  -     T4, Free  -     TSH    Bilateral leg edema  -     CBC & Differential  -     Comprehensive Metabolic Panel  -     T4, Free  -     TSH    OP (osteoporosis)  -     CBC & Differential  -     Comprehensive Metabolic Panel  -     T4, Free  -     TSH    Chronic diastolic heart failure  -     CBC & Differential  -     Comprehensive Metabolic Panel  -     T4, Free  -     TSH    Essential hypertension  -     CBC & Differential  -     Comprehensive Metabolic Panel  -     T4, Free  -     TSH    COPD, severe  -     CBC & Differential  -     Comprehensive Metabolic Panel  -     T4, Free  -     TSH    Hypoxia  -     CBC & Differential  -     Comprehensive Metabolic Panel  -     T4, Free  -     TSH    On home O2  -     CBC & Differential  -     Comprehensive Metabolic Panel  -     T4, Free  -     TSH    Wheelchair bound  -     Ambulatory Referral to Home Health  -     CBC & Differential  -     Comprehensive Metabolic Panel  -     T4, Free  -     TSH      Medicare annual wellness visit initial following recommendations  COPD severe continue home O2  Hypoxia continue home O2  On home O2 continue home O2  Wheelchair-bound noted and her greatest risk as far as  Hypertension well-controlled on current medication  Chronic diastolic heart failure  stable  Bilateral leg edema follow with more medicine to get the fluid off  Osteoporosis regular DEXA scans and prevent falls  Bilateral leg edema morbid pills for getting the fluid off  Native CAD no evidence of chest pain or anginal equivalents

## 2017-10-14 PROBLEM — I48.92 ATRIAL FLUTTER (HCC): Status: ACTIVE | Noted: 2017-01-01

## 2017-10-14 PROBLEM — R11.2 NON-INTRACTABLE VOMITING WITH NAUSEA: Status: ACTIVE | Noted: 2017-01-01

## 2017-10-14 PROBLEM — N17.9 ACUTE RENAL FAILURE (HCC): Status: ACTIVE | Noted: 2017-01-01

## 2017-10-14 PROBLEM — J18.9 PNEUMONIA OF RIGHT LOWER LOBE DUE TO INFECTIOUS ORGANISM: Status: ACTIVE | Noted: 2017-01-01

## 2017-10-14 NOTE — ED NOTES
Per pt's family, pt lives at assisted living home and when family went to check on pt, pt had a fever, high HR, and SOA. Pt denies SOA right now. She reports a chronic cough. Reassurance given; call light in reach. Pts breathing even and unlabored. Pt appears in NAD at this time. Family at bedside.        Humera Colorado RN  10/14/17 0053

## 2017-10-14 NOTE — ED PROVIDER NOTES
EMERGENCY DEPARTMENT ENCOUNTER    CHIEF COMPLAINT  Chief Complaint: SOA  History given by: pt, daughter   History limited by: none  Room Number: 27/27  PMD: Justin Jordan MD      HPI:  Pt is a 85 y.o. female who presents from assisted living complaining of increasing SOA for the past week. Pt's daughter states that she could not get the pt's O2 level over 80 today. Pt's daughter also reports productive cough with yellow sputum, generalized weakness, and leg swelling. Pt also c/o dry heaves 2 nights ago, diarrhea, and congestion. Pt denies CP, diaphoresis, nausea. Pt's daughter states that the pt has recently required assistance to ambulate, and that she believes that the pt has not been wearing her compression stockings. Pt states a Hx of CHF. And pt's family states a Hx of hypotension. Pt's daughter denies a HX of A-flutter.     Duration:  1 week  Onset: gradual   Timing: constat   Quality: SOA  Intensity/Severity: moderate   Progression: increasing   Associated Symptoms: cough, generalized weakness, leg swelling, dry heaves, diarrhea, congestion  Aggravating Factors: none stated   Alleviating Factors: none stated   Previous Episodes: Hx CHF  Treatment before arrival: none stated     PAST MEDICAL HISTORY  Active Ambulatory Problems     Diagnosis Date Noted   • Abnormal weight gain 02/01/2016   • Acute upper respiratory infection 02/01/2016   • [place] esophagus 02/01/2016   • Benign colonic polyp 02/01/2016   • Atherosclerosis of coronary artery 02/01/2016   • COPD, severe 02/01/2016   • Chest pressure 02/01/2016   • Chronic diastolic heart failure 02/01/2016   • CN (constipation) 02/01/2016   • Acute exacerbation of chronic obstructive airways disease 02/01/2016   • Cough 02/01/2016   • Bleeding from the nose 02/01/2016   • Bloodgood disease 02/01/2016   • BP (high blood pressure) 02/01/2016   • Hypoxia 02/01/2016   • Eczema intertrigo 02/01/2016   • Leg cramp 02/01/2016   • Chronic low back pain 02/01/2016    • Muscle spasms of neck 02/01/2016   • OP (osteoporosis) 02/01/2016   • Left leg pain 02/01/2016   • PND (post-nasal drip) 02/01/2016   • Right lower lobe pneumonia 02/01/2016   • Basal cell papilloma 02/01/2016   • Superficial thrombophlebitis of leg 02/01/2016   • On home O2 02/01/2016   • Intractable vomiting with nausea 03/04/2016   • Dehydration 03/04/2016   • Pain and swelling of right lower leg 05/02/2016   • Cellulitis of left leg 10/02/2016   • Left leg cellulitis 10/02/2016   • Chronic respiratory failure with hypoxia 10/03/2016   • Hospital discharge follow-up 10/12/2016   • Bilateral leg edema 10/12/2016   • Anemia 10/12/2016   • Abdominal bloating 01/12/2017   • Medicare annual wellness visit, initial 07/13/2017   • Wheelchair bound 07/13/2017     Resolved Ambulatory Problems     Diagnosis Date Noted   • No Resolved Ambulatory Problems     Past Medical History:   Diagnosis Date   • Anemia    • Atherosclerosis of coronary artery 2/1/2016   • [place] esophagus 2/1/2016   • Cancer    • Candidiasis    • Chronic diastolic heart failure 2/1/2016   • Colon polyp    • COPD (chronic obstructive pulmonary disease)    • COPD, severe 2/1/2016   • FLOYD (dyspnea on exertion)    • Eczema intertrigo 2/1/2016   • Esophageal reflux    • Fracture, carpal bone    • Hiatal hernia    • Hypertension    • Low back pain    • Macular degeneration    • On home O2 2/1/2016   • Osteopenia    • Osteoporosis    • PND (post-nasal drip) 2/1/2016   • Pneumonia    • Superficial thrombophlebitis of leg 2/1/2016       PAST SURGICAL HISTORY  Past Surgical History:   Procedure Laterality Date   • APPENDECTOMY     • FOOT SURGERY Bilateral    • VERTEBRAL AUGMENTATION  10/02/2015    percutaneous vertebral augmentation kyphoplasty- T8- Dr. Alejandro    • WRIST SURGERY Left        FAMILY HISTORY  Family History   Problem Relation Age of Onset   • Alzheimer's disease Mother    • Lung cancer Sister    • Diabetes Brother        SOCIAL  HISTORY  Social History     Social History   • Marital status:      Spouse name: N/A   • Number of children: N/A   • Years of education: N/A     Occupational History   • Not on file.     Social History Main Topics   • Smoking status: Former Smoker   • Smokeless tobacco: Not on file   • Alcohol use Not on file   • Drug use: Not on file   • Sexual activity: Not on file     Other Topics Concern   • Not on file     Social History Narrative       ALLERGIES  Codeine; Morphine and related; and Pravastatin    REVIEW OF SYSTEMS  Review of Systems   Constitutional: Negative for fever.   HENT: Positive for congestion. Negative for sore throat.    Eyes: Negative.    Respiratory: Positive for cough (productive with yellow sputum) and shortness of breath.    Cardiovascular: Positive for leg swelling. Negative for chest pain.   Gastrointestinal: Positive for diarrhea and vomiting (dry heaves). Negative for abdominal pain.   Genitourinary: Negative for dysuria.   Musculoskeletal: Negative for neck pain.   Skin: Negative for rash.   Allergic/Immunologic: Negative.    Neurological: Positive for weakness (generalized). Negative for numbness and headaches.   Hematological: Negative.    Psychiatric/Behavioral: Negative.    All other systems reviewed and are negative.      PHYSICAL EXAM  ED Triage Vitals   Temp Heart Rate Resp BP SpO2   10/13/17 2134 10/13/17 2134 10/13/17 2134 10/13/17 2134 10/13/17 2134   100.3 °F (37.9 °C) 116 18 110/70 93 %      Temp src Heart Rate Source Patient Position BP Location FiO2 (%)   10/13/17 2134 10/13/17 2134 10/14/17 0039 10/14/17 0039 --   Oral Monitor Lying Right arm        Physical Exam   Constitutional: She is oriented to person, place, and time. She appears distressed (mild).   HENT:   Head: Normocephalic and atraumatic.   Eyes: EOM are normal. Pupils are equal, round, and reactive to light.   Neck: Normal range of motion. Neck supple.   Cardiovascular: Regular rhythm and normal heart  sounds.  Tachycardia present.    Pulmonary/Chest: Effort normal. No respiratory distress. She has rhonchi in the right lower field.   Abdominal: Soft. There is no tenderness. There is no rebound and no guarding.   Musculoskeletal: Normal range of motion. She exhibits edema (3+ bilateral lower extremities).   Neurological: She is alert and oriented to person, place, and time. She has normal sensation and normal strength.   Skin: Skin is warm and dry. No rash noted.   Psychiatric: Mood and affect normal.   Nursing note and vitals reviewed.      LAB RESULTS  Lab Results (last 24 hours)     Procedure Component Value Units Date/Time    CBC & Differential [[id number]] Collected:  10/13/17 2158    Specimen:  Blood Updated:  10/13/17 2217    Narrative:       The following orders were created for panel order CBC & Differential.  Procedure                               Abnormality         Status                     ---------                               -----------         ------                     CBC Auto Differential[[id number]]        Abnormal            Final result                 Please view results for these tests on the individual orders.    Comprehensive Metabolic Panel [[id number]]  (Abnormal) Collected:  10/13/17 2158    Specimen:  Blood Updated:  10/13/17 2236     Glucose 111 (H) mg/dL      BUN 26 (H) mg/dL      Creatinine 1.11 (H) mg/dL      Sodium 143 mmol/L      Potassium 4.2 mmol/L      Chloride 99 mmol/L      CO2 35.3 (H) mmol/L      Calcium 8.7 mg/dL      Total Protein 7.6 g/dL      Albumin 3.50 g/dL      ALT (SGPT) 18 U/L      AST (SGOT) 22 U/L      Alkaline Phosphatase 82 U/L      Total Bilirubin 0.7 mg/dL      eGFR Non African Amer 47 (L) mL/min/1.73      Globulin 4.1 gm/dL      A/G Ratio 0.9 g/dL      BUN/Creatinine Ratio 23.4     Anion Gap 8.7 mmol/L     Narrative:       The MDRD GFR formula is only valid for adults with stable renal function between ages 18 and 70.    BNP [[id number]]  (Abnormal)  Collected:  10/13/17 2158    Specimen:  Blood Updated:  10/13/17 2235     proBNP 3196.0 (H) pg/mL     Narrative:       Among patients with dyspnea, NT-proBNP is highly sensitive for the detection of acute congestive heart failure. In addition NT-proBNP of <300 pg/ml effectively rules out acute congestive heart failure with 99% negative predictive value.    Troponin [[id number]]  (Normal) Collected:  10/13/17 2158    Specimen:  Blood Updated:  10/13/17 2236     Troponin T <0.010 ng/mL     Narrative:       Troponin T Reference Ranges:  Less than 0.03 ng/mL:    Negative for AMI  0.03 to 0.09 ng/mL:      Indeterminant for AMI  Greater than 0.09 ng/mL: Positive for AMI    CBC Auto Differential [[id number]]  (Abnormal) Collected:  10/13/17 2158    Specimen:  Blood Updated:  10/13/17 2217     WBC 7.23 10*3/mm3      RBC 3.34 (L) 10*6/mm3      Hemoglobin 9.5 (L) g/dL      Hematocrit 33.4 (L) %      .0 (H) fL      MCH 28.4 pg      MCHC 28.4 (L) g/dL      RDW 17.2 (H) %      RDW-SD 63.3 (H) fl      MPV 10.3 fL      Platelets 182 10*3/mm3      Neutrophil % 67.9 %      Lymphocyte % 20.9 %      Monocyte % 8.0 %      Eosinophil % 2.6 %      Basophil % 0.3 %      Immature Grans % 0.3 %      Neutrophils, Absolute 4.91 10*3/mm3      Lymphocytes, Absolute 1.51 10*3/mm3      Monocytes, Absolute 0.58 10*3/mm3      Eosinophils, Absolute 0.19 10*3/mm3      Basophils, Absolute 0.02 10*3/mm3      Immature Grans, Absolute 0.02 10*3/mm3     Lactic Acid, Plasma [[id number]]  (Normal) Collected:  10/13/17 2158    Specimen:  Blood Updated:  10/13/17 2238     Lactate 0.7 mmol/L     Blood Culture - Blood, [[id number]] Collected:  10/13/17 2158    Specimen:  Blood from Arm, Left Updated:  10/13/17 2209    Procalcitonin [[id number]]  (Abnormal) Collected:  10/13/17 2158    Specimen:  Blood Updated:  10/14/17 0310     Procalcitonin 0.06 (L) ng/mL     Narrative:       As a Marker for Sepsis (Non-Neonates):   1. <0.5 ng/mL represents a low risk of  "severe sepsis and/or septic shock.  1. >2 ng/mL represents a high risk of severe sepsis and/or septic shock.    As a Marker for Lower Respiratory Tract Infections that require antibiotic therapy:  PCT on Admission     Antibiotic Therapy             6-12 Hrs later  > 0.5                Strongly Recommended            >0.25 - <0.5         Recommended  0.1 - 0.25           Discouraged                   Remeasure/reassess PCT  <0.1                 Strongly Discouraged          Remeasure/reassess PCT      As 28 day mortality risk marker: \"Change in Procalcitonin Result\" (> 80 % or <=80 %) if Day 0 (or Day 1) and Day 4 values are available. Refer to http://www.[email or web address] 46pct-calculator.com/   Change in PCT <=80 %   A decrease of PCT levels below or equal to 80 % defines a positive change in PCT test result representing a higher risk for 28-day all-cause mortality of patients diagnosed with severe sepsis or septic shock.  Change in PCT > 80 %   A decrease of PCT levels of more than 80 % defines a negative change in PCT result representing a lower risk for 28-day all-cause mortality of patients diagnosed with severe sepsis or septic shock.                Blood Culture - Blood, [[id number]] Collected:  10/14/17 0242    Specimen:  Blood from Arm, Left Updated:  10/14/17 0246          I ordered the above labs and reviewed the results    RADIOLOGY  XR Chest 2 View   Final Result   1. Emphysema/COPD with increasing right lower lobe opacity which could   be related to an area of pneumonia.   2. Pulmonary vascular congestion with small effusions       This report was finalized on 10/13/2017 10:35 PM by Sukhwinder Shay MD.               I ordered the above noted radiological studies. Interpreted by radiologist. Reviewed by me in PACS.       PROCEDURES  Procedures  EKG           EKG time: 2143  Rhythm/Rate: A-flutter 117, artifact  QRS, axis: Q waves in V1   ST and T waves: nonspecific T wave changes      Interpreted Contemporaneously by " me, independently viewed  changed compared to prior March 22, 2015      PROGRESS AND CONSULTS  ED Course   2136  Ordered labs, CXR, and EKG for further evaluation.   2142  Ordered labs for further evaluation.   0222  Ordered labs for further evaluation.   0225  Ordered rocephin for pneumonia and lopressor for b/p.   0300  Ordered consult to [facility].  0310  Received a call from Dr. Blackman and discussed pt's case. Dr. Blackman agreed to admit the pt.      MEDICAL DECISION MAKING  Results were reviewed/discussed with the patient and they were also made aware of online access. Pt also made aware that some labs, such as cultures, will not be resulted during ER visit and follow up with PMD is necessary.     MDM  Number of Diagnoses or Management Options  Atrial flutter, paroxysmal:   Pneumonia of right lower lobe due to infectious organism:      Amount and/or Complexity of Data Reviewed  Clinical lab tests: reviewed and ordered (BUN 26, Creatine 1.11, BNP 3169.0, Troponin <0.010, HGB 9.5, Lactate 0.7, Procalcitonin 0.06)  Tests in the radiology section of CPT®: reviewed and ordered (CXR: vascular congestion, RLL opacity)  Tests in the medicine section of CPT®: reviewed and ordered (See procedures section for EKG. )  Decide to obtain previous medical records or to obtain history from someone other than the patient: yes (Pt's records in [facility].)  Discuss the patient with other providers: yes (Dr. Blackman)    Patient Progress  Patient progress: stable         DIAGNOSIS  Final diagnoses:   Pneumonia of right lower lobe due to infectious organism   Atrial flutter, paroxysmal       DISPOSITION  ADMISSION by Dr. Blackman    Discussed treatment plan and reason for admission with pt/family and admitting physician.  Pt/family voiced understanding of the plan for admission for further testing/treatment as needed.       Latest Documented Vital Signs:  As of 3:18 AM  BP- 105/62 HR- 112 Temp- 99.2 °F (37.3 °C) (Oral) O2 sat-  97%    --  Documentation assistance provided by andrzej Valadez for Dr. Puente.  Information recorded by the andrzej was done at my direction and has been verified and validated by me.     Maldonado Valadez  10/14/17 0320       Marcos Puente MD  10/14/17 1503

## 2017-10-14 NOTE — H&P
Name: Pooja Duque ADMIT: 10/14/2017   : 1931  PCP: Justin Jordan MD    MRN: [id number] LOS: 0 days   AGE/SEX: 85 y.o. female  ROOM: 419/1     Chief Complaint   Patient presents with   • Shortness of Breath   • Fever   • Cough       Subjective   Ms. Duque is a 85 y.o. female who presents to [facility] complaining of several weeks of worsening dyspnea on exertion and increasing cough that acutely got worse day before admission. She was working in her kitchen when she because acutely short of breath and started having productive cough. She was dyspneic and weak and had to sit down. She then reports period of flushness and sweating that resolved after few minutes. She denies any chest pain or palpitations. She did not have any arm or neck numbness. She did have nausea and dry heaving followed by single episode on NBNB vomitus and then that resolved. She has not had any diarrhea or abdominal pain. She also reports she has had worsening anxiety recently with symptoms of gasping for breath and abdominal pain. She has not ahd any dysuria or difficulty urinating but she does report occasional incontinence with cough. She is on 3L Home O2 at baseline and was hypoxic in ER with wheezing and required increase in O2 to 4L. She was found to be in AFlutter in ER with normal ventricular rate. She has a hx of CHF but denies any previous AFib/Flutter dx.    History of Present Illness    Past Medical History:   Diagnosis Date   • Anemia    • Atherosclerosis of coronary artery 2016   • [place] esophagus 2016   • Cancer    • Candidiasis    • CHF (congestive heart failure)    • Chronic diastolic heart failure 2016   • Colon polyp    • COPD (chronic obstructive pulmonary disease)    • COPD, severe 2016   • FLOYD (dyspnea on exertion)    • Eczema intertrigo 2016   • Esophageal reflux    • Fracture, carpal bone    • Hiatal hernia    • Hypertension    • Low back pain    •  Macular degeneration    • On home O2 2/1/2016   • Osteopenia    • Osteoporosis    • PND (post-nasal drip) 2/1/2016   • Pneumonia    • Superficial thrombophlebitis of leg 2/1/2016     Past Surgical History:   Procedure Laterality Date   • APPENDECTOMY     • FOOT SURGERY Bilateral    • VERTEBRAL AUGMENTATION  10/02/2015    percutaneous vertebral augmentation kyphoplasty- T8- Dr. Alejandro    • WRIST SURGERY Left      Family History   Problem Relation Age of Onset   • Alzheimer's disease Mother    • Lung cancer Sister    • Diabetes Brother      Social History   Substance Use Topics   • Smoking status: Former Smoker     Packs/day: 1.00     Years: 40.00     Types: Cigarettes   • Smokeless tobacco: Never Used   • Alcohol use No     Prescriptions Prior to Admission   Medication Sig Dispense Refill Last Dose   • albuterol (PROVENTIL HFA) 108 (90 BASE) MCG/ACT inhaler Inhale 2 puffs Every 4 (Four) Hours As Needed for wheezing.   Taking   • aspirin  MG tablet TAKE 1 TABLET BY MOUTH DAILY 30 tablet 2    • BROVANA 15 MCG/2ML nebulizer solution INHALE ONE VIAL BY MOUTH TWICE A DAY 60 mL 0    • budesonide (PULMICORT) 0.5 MG/2ML nebulizer solution USE 1 VIAL PER NEBULIZER TWO TIMES A DAY (MAY MIX WITH BROVANA IN THE NEBULIZER) 120 mL 1    • busPIRone (BUSPAR) 5 MG tablet Take 1 tablet by mouth 2 (Two) Times a Day. 60 tablet 3    • calcium carbonate-vitamin d 600-400 MG-UNIT per tablet TAKE 1 TABLET BY MOUTH DAILY 30 tablet 2    • furosemide (LASIX) 40 MG tablet TAKE 1 TABLET BY MOUTH DAILY 30 tablet 2    • Loratadine (CLARITIN) 10 MG capsule Take 1 tablet/day by mouth Daily.      • metoprolol succinate XL (TOPROL-XL) 100 MG 24 hr tablet TAKE 1 TABLET BY MOUTH DAILY 30 tablet 2    • Multiple Vitamin (ONE DAILY) tablet TAKE ONE TABLET BY MOUTH DAILY DO NOT TAKE WITH LEVOTHRYOXIN MUST TAKE 2 HOURS APART 30 tablet 2    • pantoprazole (PROTONIX) 40 MG EC tablet @@ TAKE 1 TABLET BY MOUTH DAILY 30 tablet 2    • Tamsulosin HCl  (FLOMAX PO) Take 0.4 capsules by mouth Daily.      • Multiple Vitamins-Minerals (CENTRUM SILVER) tablet Take by mouth.   Taking     Allergies:  Codeine; Morphine and related; and Pravastatin    Review of Systems   Constitutional: Positive for diaphoresis. Negative for chills and fever.   HENT: Negative for sore throat and trouble swallowing.    Eyes: Negative for pain and visual disturbance.   Respiratory: Positive for cough, shortness of breath and wheezing.    Cardiovascular: Positive for leg swelling. Negative for chest pain and palpitations.   Gastrointestinal: Positive for nausea and vomiting. Negative for constipation and diarrhea.   Endocrine: Negative for cold intolerance and heat intolerance.   Genitourinary: Positive for urgency. Negative for difficulty urinating and dysuria.   Musculoskeletal: Negative for neck pain and neck stiffness.   Skin: Negative for pallor and rash.   Allergic/Immunologic: Negative for environmental allergies and food allergies.   Neurological: Positive for weakness. Negative for seizures and syncope.   Hematological: Negative for adenopathy.   Psychiatric/Behavioral: Negative for agitation and confusion.        Objective    Vital Signs  Temp:  [97.6 °F (36.4 °C)-100.3 °F (37.9 °C)] 97.6 °F (36.4 °C)  Heart Rate:  [112-117] 113  Resp:  [16-24] 24  BP: ()/(32-70) 101/57  SpO2:  [93 %-97 %] 94 %  on  Flow (L/min):  [3-4] 4;   O2 Device: nasal cannula  Body mass index is 41.12 kg/(m^2).    Physical Exam   Constitutional: She is oriented to person, place, and time. She appears well-developed and well-nourished. No distress.   HENT:   Head: Normocephalic and atraumatic.   Nose: Nose normal.   Eyes: Conjunctivae and EOM are normal. Pupils are equal, round, and reactive to light.   Neck: Normal range of motion. Neck supple.   Cardiovascular: Intact distal pulses.  An irregular rhythm present. Tachycardia present.    No murmur heard.  Pulmonary/Chest: Effort normal. She has decreased  breath sounds. She has wheezes. She has rales.   Abdominal: Soft. Bowel sounds are normal. She exhibits no distension. There is no tenderness. There is no rebound and no guarding.   Musculoskeletal: Normal range of motion. She exhibits edema (1+ BLE).   Neurological: She is alert and oriented to person, place, and time. No cranial nerve deficit.   Skin: Skin is warm and dry. She is not diaphoretic.   Psychiatric: She has a normal mood and affect. Her behavior is normal.   Nursing note and vitals reviewed.      Results Review:   I reviewed the patient's new clinical results. Reviewed imaging, agree with interpretation. Reviewed EKG, atrial tachycardia with ventricular rate 110s. Reviewed telemetry, aflutter with variable conduction rate 2-3:1, ventricular rate 110s. Reviewed prior records.    Results from last 7 days  Lab Units 10/14/17  0555 10/13/17  2158   WBC 10*3/mm3 6.55 7.23   HEMOGLOBIN g/dL 9.9* 9.5*   PLATELETS 10*3/mm3 191 182     Results from last 7 days  Lab Units 10/14/17  0555 10/13/17  2158   SODIUM mmol/L 143 143   POTASSIUM mmol/L 4.4 4.2   CHLORIDE mmol/L 101 99   CO2 mmol/L 35.6* 35.3*   BUN mg/dL 31* 26*   CREATININE mg/dL 1.40* 1.11*   GLUCOSE mg/dL 125* 111*   ALBUMIN g/dL  --  3.50   BILIRUBIN mg/dL  --  0.7   ALK PHOS U/L  --  82   AST (SGOT) U/L  --  22   ALT (SGPT) U/L  --  18   Estimated Creatinine Clearance: 31.6 mL/min (by C-G formula based on Cr of 1.4).  Results from last 7 days  Lab Units 10/13/17  2158   TROPONIN T ng/mL <0.010   PROBNP pg/mL 3196.0*           XR Chest 2 View   Final Result   1. Emphysema/COPD with increasing right lower lobe opacity which could   be related to an area of pneumonia.   2. Pulmonary vascular congestion with small effusions       This report was finalized on 10/13/2017 10:35 PM by Sukhwinder Shay MD.            Assessment/Plan   Assessment:     Active Hospital Problems (** Indicates Principal Problem)    Diagnosis Date Noted   • **Pneumonia of right  lower lobe due to infectious organism [J18.1] 10/14/2017   • Atrial flutter [I48.92] 10/14/2017   • Non-intractable vomiting with nausea [R11.2] 10/14/2017   • Acute renal failure [N17.9] 10/14/2017   • Acute on chronic respiratory failure with hypoxia [J96.21] 10/03/2016   • On home O2 [Z99.81] 02/01/2016   • COPD, severe [J44.9] 02/01/2016   • Acute on chronic diastolic heart failure [I50.33] 02/01/2016      Resolved Hospital Problems    Diagnosis Date Noted Date Resolved   No resolved problems to display.       Plan:     - PNA/COPD AE: Infiltrate on CXR with wheezing/rales on exam and near fever (100.3). Procal is normal. With her sputum changes she would warrant antibiotics for COPD AE as well. Will give Azithromycin and Rocephin for antibiotic coverage. Check urine antigens and RVP. Give breathing treatments and PO steroid. A/C hypoxic respiratory failure now requiring 4L.  - AFlutter/A/C Diastolic HF: Need to rule out ACS with her previous diaphoresis and nausea episode. Initial troponin was negative, EKG doesn't have st changes, and she has no CP currently. Telemetry has flutter with variable conduction. Will give ASA and Atorvastatin. Continue home metoprolol. BNP elevated and has edema so will give IV lasix x1. Check Echo and repeat troponin. Consult Cardiology  - BREA: 2/2 HF vs AF. Check UA and PVR with her reports of urgency and incontinence. Monitor with diuresis.  - Ppx Lovenox  - Full Code    I discussed the patients findings and my recommendations with patient and nursing staff.    Kevin Donis MD  [facility]  10/14/17  9:07 AM

## 2017-10-14 NOTE — CONSULTS
Patient Name: Pooja Duque  Age/Sex: 85 y.o. female  : 1931  MRN: [id number]    Date of Admission: 10/14/2017  Date of Encounter Visit: 10/14/17  Encounter Provider: Kush Paul MD  Place of Service: [facility]      Referring Provider: Chavo Blackman MD  Patient Care Team:  Justin Jordan MD as PCP - General  Justin Jordan MD as PCP - Family Medicine  KYM Ventura as PCP - Claims Attributed    Subjective:     Consulted for: Atrial flutter    Chief Complaint: SOA, generalized weakness     History of Present Illness:  Pooja Duque is a 85 y.o. female with a hx of atrial fibrillation ( a short burst was last seen on a holter monitor in ), CHF, COPD, & HTN. She is followed by Dr. Faustino renee and was last seen in , where it was noted that she had a cardiac catheterization in  which showed minor disease in her LAD- other coronaries were normal. It was felt that her chest discomfort then was due to GERD.  Her last echocardiogram was in 2016 which showed abnormal left ventricular diastolic filling, left atrium mildly dilated, mild aortic regurgitation, and mild to moderate tricuspid regurgitation.     She came into the ER today from assisted living facility for worsening SOA for the past week. She was in her kitchen today when she suddenly became SOA and felt like she had to sit down right away. Her O2 sats were noted to be in the 80s on 2 L of oxygen ( which is her baseline)- she had to increase it to 4L. Shortness of air was severe.  Did not improve with positional alteration or rest.  Denied chest pain.  She also c/o productive cough, generalized weakness, and leg swelling.     ER work up suspected her to be in atrial flutter with normal ventricular rate.     Negative troponin x 1. BNP of 3196- she was given a one time dose of IV lasix. Echocardiogram pending.  She is also getting treated for  pneumonia.Blood cultures pending.    Previous testing:   Echocardiogram on 3/8/16-      Holter Monitor on 3/4/16-  IMPRESSION: Short run of atrial fibrillation that lasted for approximately 3  hours.     Echocardiogram on 4/29/15-  Conclusions  There is normal left ventricular systolic function.  Mild concentric left ventricular hypertrophy is observed.  Abnormal left ventricular diastolic filling is observed, consistent with  Grade 1a (impaired LV relaxation with high Left Atrial pressure).  The left atrium is mildly dilated.  There is mild aortic regurgitation.   There is mild tricuspid regurgitation.    Past Medical History:  Past Medical History:   Diagnosis Date   • Anemia    • Atherosclerosis of coronary artery 2/1/2016   • [place] esophagus 2/1/2016   • Cancer    • Candidiasis    • CHF (congestive heart failure)    • Chronic diastolic heart failure 2/1/2016   • Colon polyp    • COPD (chronic obstructive pulmonary disease)    • COPD, severe 2/1/2016   • FLOYD (dyspnea on exertion)    • Eczema intertrigo 2/1/2016   • Esophageal reflux    • Fracture, carpal bone    • Hiatal hernia    • Hypertension    • Low back pain    • Macular degeneration    • On home O2 2/1/2016   • Osteopenia    • Osteoporosis    • PND (post-nasal drip) 2/1/2016   • Pneumonia    • Superficial thrombophlebitis of leg 2/1/2016       Past Surgical History:   Procedure Laterality Date   • APPENDECTOMY     • FOOT SURGERY Bilateral    • VERTEBRAL AUGMENTATION  10/02/2015    percutaneous vertebral augmentation kyphoplasty- T8- Dr. Alejandro    • WRIST SURGERY Left        Home Medications:   Prescriptions Prior to Admission   Medication Sig Dispense Refill Last Dose   • albuterol (PROVENTIL HFA) 108 (90 BASE) MCG/ACT inhaler Inhale 2 puffs Every 4 (Four) Hours As Needed for wheezing.   Taking   • aspirin  MG tablet TAKE 1 TABLET BY MOUTH DAILY 30 tablet 2    • BROVANA 15 MCG/2ML nebulizer solution INHALE ONE VIAL BY MOUTH TWICE A DAY 60 mL 0     • budesonide (PULMICORT) 0.5 MG/2ML nebulizer solution USE 1 VIAL PER NEBULIZER TWO TIMES A DAY (MAY MIX WITH BROVANA IN THE NEBULIZER) 120 mL 1    • busPIRone (BUSPAR) 5 MG tablet Take 1 tablet by mouth 2 (Two) Times a Day. 60 tablet 3    • calcium carbonate-vitamin d 600-400 MG-UNIT per tablet TAKE 1 TABLET BY MOUTH DAILY 30 tablet 2    • furosemide (LASIX) 40 MG tablet TAKE 1 TABLET BY MOUTH DAILY 30 tablet 2    • Loratadine (CLARITIN) 10 MG capsule Take 1 tablet/day by mouth Daily.      • metoprolol succinate XL (TOPROL-XL) 100 MG 24 hr tablet TAKE 1 TABLET BY MOUTH DAILY 30 tablet 2    • Multiple Vitamin (ONE DAILY) tablet TAKE ONE TABLET BY MOUTH DAILY DO NOT TAKE WITH LEVOTHRYOXIN MUST TAKE 2 HOURS APART 30 tablet 2    • pantoprazole (PROTONIX) 40 MG EC tablet @@ TAKE 1 TABLET BY MOUTH DAILY 30 tablet 2    • Tamsulosin HCl (FLOMAX PO) Take 0.4 capsules by mouth Daily.      • Multiple Vitamins-Minerals (CENTRUM SILVER) tablet Take by mouth.   Taking       Allergies:  Allergies   Allergen Reactions   • Codeine    • Morphine And Related    • Pravastatin      Patient denies       Past Social History:  Social History     Social History   • Marital status:      Spouse name: N/A   • Number of children: N/A   • Years of education: N/A     Occupational History   • Not on file.     Social History Main Topics   • Smoking status: Former Smoker     Packs/day: 1.00     Years: 40.00     Types: Cigarettes   • Smokeless tobacco: Never Used   • Alcohol use No   • Drug use: No   • Sexual activity: No     Other Topics Concern   • Not on file     Social History Narrative        Past Family History:  Family History   Problem Relation Age of Onset   • Alzheimer's disease Mother    • Lung cancer Sister    • Diabetes Brother          Review of Systems:  All systems reviewed. Pertinent positives identified in HPI. All other systems are negative.       Objective:     Objective:  Temp:  [97.6 °F (36.4 °C)-100.3 °F (37.9 °C)]  97.6 °F (36.4 °C)  Heart Rate:  [112-117] 114  Resp:  [16-24] 22  BP: ()/(32-70) 101/57    Intake/Output Summary (Last 24 hours) at 10/14/17 1432  Last data filed at 10/14/17 0509   Gross per 24 hour   Intake              250 ml   Output                0 ml   Net              250 ml     Body mass index is 41.12 kg/(m^2).  Last 3 weights    10/13/17  2134 10/14/17  0528   Weight: 200 lb (90.7 kg) 217 lb 9.6 oz (98.7 kg)           Physical Exam:   General Appearance Well developed, cooperative and well nourished and no acute distress   Head Normocephalic, without abnormality, atraumatic   Ears Ears appear intact with no abnormalities noted   Throat No oral lesions, no thrush, oral mucosa moist   Neck No adenopathy, supple, trachea midline, no thyromegaly, no carotid bruit, no JVD   Back No skin lesions, erythema or scars, no tenderness to percussion or palptaion,range of motion is normal   Lungs Clear to auscultation,respirations regular, even and unlabored   Heart Regular rhythm and tachycardic rate, normal S1 and S2, no murmur, no gallop, no rub, no click   Chest wall No abnormalities observed   Abdomen Normal bowel sounds, no masses, no hepatomegaly,    Extremities Moves all extremities well, no edema, no cyanosis, no redness   Pulses Pulses palpable and equal bilaterally. Normal radial, carotid, femoral, dorsalis pedis and posterior tibial pulses bilaterally. Normal abdominal aorta   Skin No bleeding, bruising or rash   Psyhiatric Alert and oriented x 3, normal mood and affect       Lab Review:       Results from last 7 days  Lab Units 10/14/17  0555 10/13/17  2158   SODIUM mmol/L 143 143   POTASSIUM mmol/L 4.4 4.2   CHLORIDE mmol/L 101 99   CO2 mmol/L 35.6* 35.3*   BUN mg/dL 31* 26*   CREATININE mg/dL 1.40* 1.11*   GLUCOSE mg/dL 125* 111*   CALCIUM mg/dL 8.5* 8.7         Results from last 7 days  Lab Units 10/13/17  2158   TROPONIN T ng/mL <0.010       Results from last 7 days  Lab Units 10/14/17  0555    WBC 10*3/mm3 6.55   HEMOGLOBIN g/dL 9.9*   HEMATOCRIT % 34.9*   PLATELETS 10*3/mm3 191                       Results from last 7 days  Lab Units 10/13/17  2158   PROBNP pg/mL 3196.0*               Imaging:    Imaging Results (most recent)     Procedure Component Value Units Date/Time    XR Chest 2 View [[id number]] Collected:  10/13/17 2232     Updated:  10/13/17 2238    Narrative:       PA AND LATERAL CHEST X-RAY     HISTORY: SOA  triage protocol     COMPARISON: 05/02/2016.     FINDINGS: PA and lateral views of the chest were obtained. There is  severe kyphosis. The lungs are hyperexpanded suggesting COPD. There is  underlying emphysema. There is an opacity in the right lower lobe which  may be related to atelectasis or pneumonia. There is pulmonary vascular  congestion without overt edema/CHF. The heart is enlarged. Small  effusions are present posteriorly. There is generalized osteopenia.  There are several compression deformities in the thoracolumbar spine one  of which is status post vertebroplasty. They are stable compared to  previous (and most likely osteoporotic in nature considering marked  regional osteopenia.)       Impression:       1. Emphysema/COPD with increasing right lower lobe opacity which could  be related to an area of pneumonia.  2. Pulmonary vascular congestion with small effusions     This report was finalized on 10/13/2017 10:35 PM by Sukhwinder Shay MD.             Results for orders placed in visit on 09/25/15   SCANNED - ECHOCARDIOGRAM       EKG: 10/14/17-          BASELINE: 10/13/17      I personally viewed and interpreted the patient's EKG/Telemetry data.    Assessment:   Assessment/Plan   Principal Problem:    Pneumonia of right lower lobe due to infectious organism  Active Problems:    COPD, severe    Acute on chronic diastolic heart failure    On home O2    Acute on chronic respiratory failure with hypoxia    Atrial flutter    Non-intractable vomiting with nausea    Acute renal  failure      Plan:   -Patient does not have evidence of volume overload as the etiology for her shortness of air.  She does have an elevated proBNP, however she now has acute kidney injury secondary to diuresis.  In addition she does not have an elevated JVP and her IVC size on echocardiogram is normal.  -Her ejection fraction is normal.  -She does have atrial flutter versus atrial tachycardia with a ventricular rate of about 120 bpm.  Blood pressure will likely only tolerate a slight increase the beta blockade.  I have increased her Toprol to 150 mg by mouth daily.  I will give her an additional dose of 50 mg today.  One-time dose of digoxin.  -Would not recommend anticoagulation and cardioversion.  -In addition I would not recommend additional diuresis    Thank you for allowing me to participate in the care of Pooja Duque. Feel free to contact me directly with any further questions or concerns.    Kush Paul MD  [facility]  10/14/17  2:31 PM

## 2017-10-14 NOTE — ED NOTES
Pt reports she started feeling soa for weeks and has gotten worse since yesterday fever     Isaac Everett, DUSTIN  10/13/17 8930

## 2017-10-14 NOTE — PLAN OF CARE
Problem: Patient Care Overview (Adult)  Goal: Plan of Care Review  Outcome: Ongoing (interventions implemented as appropriate)    10/14/17 0613   Coping/Psychosocial Response Interventions   Plan Of Care Reviewed With patient;family   Patient Care Overview   Progress no change   Outcome Evaluation   Outcome Summary/Follow up Plan patient just admitted from er; vitals stable; received iv abx x 2; needs urine and rvp; will monitor and await orders       Goal: Adult Individualization and Mutuality  Outcome: Ongoing (interventions implemented as appropriate)  Goal: Discharge Needs Assessment  Outcome: Ongoing (interventions implemented as appropriate)    Problem: Fall Risk (Adult)  Goal: Identify Related Risk Factors and Signs and Symptoms  Outcome: Outcome(s) achieved Date Met:  10/14/17  Goal: Absence of Falls  Outcome: Ongoing (interventions implemented as appropriate)    Problem: Pneumonia (Adult)  Goal: Signs and Symptoms of Listed Potential Problems Will be Absent or Manageable (Pneumonia)  Outcome: Ongoing (interventions implemented as appropriate)

## 2017-10-14 NOTE — PLAN OF CARE
Problem: Patient Care Overview (Adult)  Goal: Plan of Care Review  Outcome: Ongoing (interventions implemented as appropriate)    10/14/17 8933   Outcome Evaluation   Outcome Summary/Follow up Plan aao x 4, o2 @ 4 ltrs, st, still need urine specimen       Goal: Adult Individualization and Mutuality  Outcome: Ongoing (interventions implemented as appropriate)  Goal: Discharge Needs Assessment  Outcome: Ongoing (interventions implemented as appropriate)    Problem: Fall Risk (Adult)  Goal: Absence of Falls  Outcome: Ongoing (interventions implemented as appropriate)    Problem: Pneumonia (Adult)  Goal: Signs and Symptoms of Listed Potential Problems Will be Absent or Manageable (Pneumonia)  Outcome: Ongoing (interventions implemented as appropriate)

## 2017-10-15 NOTE — PLAN OF CARE
Problem: Patient Care Overview (Adult)  Goal: Plan of Care Review  Outcome: Ongoing (interventions implemented as appropriate)    10/15/17 1547   Coping/Psychosocial Response Interventions   Plan Of Care Reviewed With patient   Patient Care Overview   Progress no change   Outcome Evaluation   Outcome Summary/Follow up Plan HR continues to be elevated. medications adjusted to lower HR.        Goal: Adult Individualization and Mutuality  Outcome: Ongoing (interventions implemented as appropriate)  Goal: Discharge Needs Assessment  Outcome: Ongoing (interventions implemented as appropriate)    Problem: Fall Risk (Adult)  Goal: Absence of Falls  Outcome: Ongoing (interventions implemented as appropriate)    Problem: Pneumonia (Adult)  Goal: Signs and Symptoms of Listed Potential Problems Will be Absent or Manageable (Pneumonia)  Outcome: Ongoing (interventions implemented as appropriate)

## 2017-10-15 NOTE — PROGRESS NOTES
"DAILY PROGRESS NOTE  [facility]    Patient Identification:  Name: Pooja Duque  Age: 85 y.o.  Sex: female  :  1931  MRN: [id number]         Primary Care Physician: Justin Jordan MD    Subjective:  Interval History:She is feeling better. On 4 L O 2.    Objective:    Scheduled Meds:  arformoterol 15 mcg Nebulization BID - RT   aspirin  mg Oral Daily   azithromycin 500 mg Intravenous Q24H   budesonide 0.5 mg Nebulization BID - RT   busPIRone 5 mg Oral BID   ceftriaxone 1 g Intravenous Q24H   cetirizine 5 mg Oral Nightly   digoxin 250 mcg Intravenous Once   [START ON 10/16/2017] digoxin 125 mcg Oral Daily   enoxaparin 40 mg Subcutaneous Nightly   [START ON 10/16/2017] metoprolol succinate  mg Oral Q24H   metoprolol succinate XL 50 mg Oral Once   nystatin  Topical Q12H   predniSONE 20 mg Oral Daily With Breakfast   tamsulosin 0.4 mg Oral Daily     Continuous Infusions:     Vital signs in last 24 hours:  Temp:  [97.4 °F (36.3 °C)-97.8 °F (36.6 °C)] 97.4 °F (36.3 °C)  Heart Rate:  [101-113] 113  Resp:  [20-22] 20  BP: (110-114)/(61-68) 114/68    Intake/Output:    Intake/Output Summary (Last 24 hours) at 10/15/17 1226  Last data filed at 10/14/17 1917   Gross per 24 hour   Intake                0 ml   Output              400 ml   Net             -400 ml       Exam:  /68 (BP Location: Right arm, Patient Position: Sitting)  Pulse 113  Temp 97.4 °F (36.3 °C) (Oral)   Resp 20  Ht 61\" (154.9 cm)  Wt 214 lb 6.4 oz (97.3 kg)  SpO2 100%  BMI 40.51 kg/m2    General Appearance:    Alert, cooperative, no distress   Head:    Normocephalic, without obvious abnormality, atraumatic   Eyes:       Throat:   Lips, tongue, gums normal   Neck:   Supple, symmetrical, trachea midline, no JVD   Lungs:     Rhonchi and crackles and wheezes bilaterally, respirations unlabored   Chest Wall:    No tenderness or deformity    Heart:    Regular rate and rhythm, S1 and S2 normal, no murmur,no  " Rub or gallop   Abdomen:     Soft, non-tender, bowel sounds active, no masses, no organomegaly    Extremities:   Extremities normal, atraumatic, no cyanosis , some leg edema   Pulses:      Skin:   Skin is warm and dry,  no rashes or palpable lesions   Neurologic:   no focal deficits noted      [unfilled]  Data Review:    Results from last 7 days  Lab Units 10/15/17  0354 10/14/17  0555 10/13/17  2158   SODIUM mmol/L 144 143 143   POTASSIUM mmol/L 4.2 4.4 4.2   CHLORIDE mmol/L 100 101 99   CO2 mmol/L 33.3* 35.6* 35.3*   BUN mg/dL 31* 31* 26*   CREATININE mg/dL 1.16* 1.40* 1.11*   GLUCOSE mg/dL 176* 125* 111*   CALCIUM mg/dL 7.9* 8.5* 8.7       Results from last 7 days  Lab Units 10/15/17  0354 10/14/17  0555 10/13/17  2158   WBC 10*3/mm3 7.60 6.55 7.23   HEMOGLOBIN g/dL 9.1* 9.9* 9.5*   HEMATOCRIT % 31.8* 34.9* 33.4*   PLATELETS 10*3/mm3 165 191 182       Results from last 7 days  Lab Units 10/15/17  0354   TSH mIU/mL 0.719           Lab Results  Lab Value Date/Time   TROPONINT <0.010 10/14/2017 1827   TROPONINT <0.010 10/14/2017 1502   TROPONINT <0.010 10/13/2017 2158   TROPONINT <0.01 03/01/2016 1317   TROPONINT <0.01 02/29/2016 2114       Results from last 7 days  Lab Units 10/15/17  0354   CHOLESTEROL mg/dL 128   TRIGLYCERIDES mg/dL 87   HDL CHOL mg/dL 33*       Results from last 7 days  Lab Units 10/13/17  2158   ALK PHOS U/L 82   BILIRUBIN mg/dL 0.7   ALT (SGPT) U/L 18   AST (SGOT) U/L 22       Results from last 7 days  Lab Units 10/15/17  0354   TSH mIU/mL 0.719         No results found for: POCGLU    Results from last 7 days  Lab Units 10/15/17  0354   INR  1.19*       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   • Benign colonic polyp K63.5   • Atherosclerosis of coronary artery I25.10   • COPD, severe J44.9   • Chest pressure R07.89   • Acute on chronic diastolic heart failure I50.33   • CN  (constipation) K59.00   • Acute exacerbation of chronic obstructive airways disease J44.1   • Cough R05   • Bleeding from the nose R04.0   • Bloodgood disease N60.19   • BP (high blood pressure) I10   • Hypoxia R09.02   • Eczema intertrigo L30.4   • Leg cramp R25.2   • Chronic low back pain M54.5, G89.29   • Muscle spasms of neck M62.838   • OP (osteoporosis) M81.0   • Left leg pain M79.605   • PND (post-nasal drip) R09.82   • Right lower lobe pneumonia J18.1   • Basal cell papilloma L82.1   • Superficial thrombophlebitis of leg I80.00   • On home O2 Z99.81   • Intractable vomiting with nausea R11.2   • Dehydration E86.0   • Pain and swelling of right lower leg M79.661, M79.89   • Cellulitis of left leg L03.116   • Left leg cellulitis L03.116   • Acute on chronic respiratory failure with hypoxia J96.21   • Hospital discharge follow-up Z09   • Bilateral leg edema R60.0   • Anemia D64.9   • Abdominal bloating R14.0   • Medicare annual wellness visit, initial Z00.00   • Wheelchair bound Z99.3   • Pneumonia of right lower lobe due to infectious organism J18.1   • Atrial flutter I48.92   • Non-intractable vomiting with nausea R11.2   • Acute renal failure N17.9       Assessment:  Principal Problem:    Pneumonia of right lower lobe due to infectious organism  Active Problems:    COPD, severe    Acute on chronic diastolic heart failure    On home O2    Acute on chronic respiratory failure with hypoxia    Atrial flutter    Non-intractable vomiting with nausea    Acute renal failure      Plan:  Continue with antibiotics and O 2 and nebs. Get PT eval she lives in assisted living. ??? May need SNU??    Ronny Delgado MD  10/15/2017  12:26 PM

## 2017-10-15 NOTE — PLAN OF CARE
Problem: Patient Care Overview (Adult)  Goal: Plan of Care Review  Outcome: Ongoing (interventions implemented as appropriate)    10/15/17 0532   Coping/Psychosocial Response Interventions   Plan Of Care Reviewed With patient   Patient Care Overview   Progress improving   Outcome Evaluation   Outcome Summary/Follow up Plan Pt's sats have been stable throughout shift on the 4 L. Urine sent, negative. C/o pain up the entire arm during administration of IV azithromycin, note left for MD to address. Denies any other problems at this time, will continue to monitor.        Goal: Adult Individualization and Mutuality  Outcome: Ongoing (interventions implemented as appropriate)  Goal: Discharge Needs Assessment  Outcome: Ongoing (interventions implemented as appropriate)    Problem: Fall Risk (Adult)  Goal: Absence of Falls  Outcome: Ongoing (interventions implemented as appropriate)

## 2017-10-15 NOTE — PROGRESS NOTES
"Patient Care Team:  Justin Jordan MD as PCP - General  Justin Jordan MD as PCP - Family Medicine  KYM Ventura as PCP - Claims Attributed    Chief Complaint: Follow-up atrial flutter with a rapid ventricular rate    Interval History:   No significant change in heart rate.  Renal function has improved off of diuretic therapy    Objective   Vital Signs  Temp:  [97.4 °F (36.3 °C)-97.8 °F (36.6 °C)] 97.4 °F (36.3 °C)  Heart Rate:  [101-113] 113  Resp:  [20-22] 20  BP: (110-114)/(61-68) 114/68    Intake/Output Summary (Last 24 hours) at 10/15/17 0946  Last data filed at 10/14/17 1917   Gross per 24 hour   Intake                0 ml   Output              400 ml   Net             -400 ml     Flowsheet Rows         First Filed Value    Admission Height  61\" (154.9 cm) Documented at 10/13/2017 2134    Admission Weight  200 lb (90.7 kg) Documented at 10/13/2017 2134          General Appearance:    Alert, cooperative, in no acute distress   Head:    Normocephalic, without obvious abnormality, atraumatic       Neck:   No adenopathy, supple, no thyromegaly, no carotid bruit, no    JVD   Lungs:     Clear to auscultation bilaterally, no wheezes, rales, or     rhonchi    Heart:   Tachycardic rate, regular rhythm,  No murmur, rub, or gallop   Chest Wall:    No abnormalities observed   Abdomen:     Normal bowel sounds, soft, non-tender, non-distended,            no rebound tenderness   Extremities:   No cyanosis, clubbing, or edema   Pulses:   Pulses palpable and equal bilaterally   Skin:   No bleeding or rash       Neurologic:   Cranial nerves 2 - 12 grossly intact, sensation intact             arformoterol 15 mcg Nebulization BID - RT   aspirin  mg Oral Daily   azithromycin 500 mg Intravenous Q24H   budesonide 0.5 mg Nebulization BID - RT   busPIRone 5 mg Oral BID   ceftriaxone 1 g Intravenous Q24H   cetirizine 5 mg Oral Nightly   enoxaparin 40 mg Subcutaneous Nightly   metoprolol succinate  mg Oral " Q24H   nystatin  Topical Q12H   pantoprazole 40 mg Oral Q AM   predniSONE 20 mg Oral Daily With Breakfast   tamsulosin 0.4 mg Oral Daily            Results Review:      Results from last 7 days  Lab Units 10/15/17  0354   SODIUM mmol/L 144   POTASSIUM mmol/L 4.2   CHLORIDE mmol/L 100   CO2 mmol/L 33.3*   BUN mg/dL 31*   CREATININE mg/dL 1.16*   GLUCOSE mg/dL 176*   CALCIUM mg/dL 7.9*       Results from last 7 days  Lab Units 10/14/17  1827 10/14/17  1502 10/13/17  2158   TROPONIN T ng/mL <0.010 <0.010 <0.010       Results from last 7 days  Lab Units 10/15/17  0354   WBC 10*3/mm3 7.60   HEMOGLOBIN g/dL 9.1*   HEMATOCRIT % 31.8*   PLATELETS 10*3/mm3 165       Results from last 7 days  Lab Units 10/15/17  0354   INR  1.19*       Results from last 7 days  Lab Units 10/15/17  0354   CHOLESTEROL mg/dL 128       Results from last 7 days  Lab Units 10/15/17  0354   MAGNESIUM mg/dL 2.1       Results from last 7 days  Lab Units 10/15/17  0354   CHOLESTEROL mg/dL 128   TRIGLYCERIDES mg/dL 87   HDL CHOL mg/dL 33*       I reviewed the patient's new clinical results.  I personally viewed and interpreted the patient's EKG/Telemetry data        Assessment/Plan   Pneumonia of right lower lobe due to infectious organism    COPD, severe    Acute on chronic diastolic heart failure    On home O2    Acute on chronic respiratory failure with hypoxia    Atrial flutter: Mild rapid ventricular rate    Non-intractable vomiting with nausea    Acute renal failure: Creatinine has improved to 1.16       Plan:   -Patient does not have evidence of volume overload as the etiology for her shortness of air.  She does have an elevated proBNP, however she now has acute kidney injury secondary to diuresis.  In addition she does not have an elevated JVP and her IVC size on echocardiogram is normal.  -Her ejection fraction is normal.  -She does have atrial flutter versus atrial tachycardia with a ventricular rate of about 120 bpm.    -Would not recommend  anticoagulation and cardioversion.  -In addition I would not recommend additional diuresis  -I will try to press up a little bit more on her metoprolol therapy.  Metoprolol succinate will be increased to 200 mg daily.  I will give her another 500 mg of digoxin IV and place her on 0.125 mg daily.  Her renal function has improved to where I think this will be tolerated  -I would leave her at this rate if this does not work rather than recommending Coumadin and cardioversion

## 2017-10-16 NOTE — PROGRESS NOTES
LOS: 2 days   Patient Care Team:  Justin Jordan MD as PCP - General  Justin Jordan MD as PCP - Family Medicine  KYM Ventura as PCP - Claims Attributed    Chief Complaint   Patient presents with   • Shortness of Breath   • Fever   • Cough         Subjective   Breathing is worse early in day. Nose feels stopped up & feels like she can't get enough air in.     Objective     Vital Signs  Temp:  [97.5 °F (36.4 °C)-97.9 °F (36.6 °C)] 97.5 °F (36.4 °C)  Heart Rate:  [] 101  Resp:  [16-22] 18  BP: (109-127)/(60-82) 119/75    Physical Exam:  Obese WF in NAD, currently on 4 liters O2. On the graphic, it has recorded that she is on 3 liters which is not correct  Skin warm & dry  Lungs BS markedly decreased throughout  Heart RRR  Abd soft, NT, BS+  Ext trace-1+ edema     Results Review:     I reviewed the patient's new clinical results.    Medication Review:       LABS    Results from last 7 days  Lab Units 10/16/17  0804 10/15/17  0354 10/14/17  0555   SODIUM mmol/L 145 144 143   POTASSIUM mmol/L 4.3 4.2 4.4   CHLORIDE mmol/L 103 100 101   CO2 mmol/L 33.5* 33.3* 35.6*   BUN mg/dL 28* 31* 31*   CREATININE mg/dL 0.96 1.16* 1.40*   GLUCOSE mg/dL 131* 176* 125*   CALCIUM mg/dL 8.4* 7.9* 8.5*       Results from last 7 days  Lab Units 10/16/17  0804 10/15/17  0354 10/14/17  0555   WBC 10*3/mm3 8.27 7.60 6.55   HEMOGLOBIN g/dL 9.5* 9.1* 9.9*   HEMATOCRIT % 33.9* 31.8* 34.9*   PLATELETS 10*3/mm3 186 165 191       Results from last 7 days  Lab Units 10/15/17  0354   INR  1.19*           Assessment/Plan     Principal Problem:    Pneumonia of right lower lobe due to infectious organism  Active Problems:    [place] esophagus    COPD, severe    Acute on chronic diastolic heart failure - will recheck BNP in am.    Hypertension    On home O2    Acute on chronic respiratory failure with hypoxia    Anemia    Atrial flutter    Non-intractable vomiting with nausea    Acute renal failure - she may need to be a  little on the dry side.          Bhargavi Vann MD  10/16/17  6:55 PM      Time:

## 2017-10-16 NOTE — PLAN OF CARE
Problem: Patient Care Overview (Adult)  Goal: Plan of Care Review    10/16/17 0932   Coping/Psychosocial Response Interventions   Plan Of Care Reviewed With patient   Outcome Evaluation   Outcome Summary/Follow up Plan Pt presents with PNA, upon exam demonstrates generalized weakness, impaired balance, decreased endurance, and decreased aerobic capacity limiting mobility. Pt currently requiring assist of one for safety; gait distance limited by SOA and decreased O2 sats with exertion. Pt would benefit from PT to address impairments and assist w/return to PLOF.          Problem: Inpatient Physical Therapy  Goal: Bed Mobility Goal LTG- PT    10/16/17 0932   Bed Mobility PT LTG   Bed Mobility PT LTG, Date Established 10/16/17   Bed Mobility PT LTG, Time to Achieve 1 wk   Bed Mobility PT LTG, Activity Type all bed mobility   Bed Mobility PT LTG, [place] Level independent       Goal: Transfer Training Goal 1 LTG- PT    10/16/17 0932   Transfer Training PT LTG   Transfer Training PT LTG, Date Established 10/16/17   Transfer Training PT LTG, Time to Achieve 1 wk   Transfer Training PT LTG, Activity Type all transfers   Transfer Training PT LTG, [place] Level supervision required   Transfer Training PT LTG, Assist Device walker, rolling  (rollator)       Goal: Gait Training Goal LTG- PT    10/16/17 0932   Gait Training PT LTG   Gait Training Goal PT LTG, Date Established 10/16/17   Gait Training Goal PT LTG, Time to Achieve 1 wk   Gait Training Goal PT LTG, [place] Level supervision required   Gait Training Goal PT LTG, Assist Device walker, rolling  (rollator)   Gait Training Goal PT LTG, Distance to Achieve 50   Gait Training Goal PT LTG, Additional Goal with O2 sats >= 88%        Goal: Patient Education Goal LTG- PT    10/16/17 0932   Patient Education PT LTG   Patient Education PT LTG, Date Established 10/16/17   Patient Education PT LTG, Time to Achieve 1 wk   Patient Education PT LTG, Education Type HEP    Patient Education PT LTG, Education Understanding demonstrate adequately

## 2017-10-16 NOTE — PROGRESS NOTES
Continued Stay Note  [facility]     Patient Name: Pooja Duque  MRN: [id number]  Today's Date: 10/16/2017    Admit Date: 10/14/2017          Discharge Plan       10/16/17 1452    Case Management/Social Work Plan    Plan Return to [facility]  with [facility]    Patient/Family In Agreement With Plan yes    Additional Comments Spoke with Zuleima(daughter-in-law) at [phone] and she would like for [facility](Ninoska notified at ext. [phone]) to follow patient at [facility] at discharge as she has used them in the past.  CCP will continue to follow and assist as needed....Dee Dee Ramires RN,CCP                     Discharge Codes     None            Dee Dee Ramires RN

## 2017-10-16 NOTE — PROGRESS NOTES
"Patient Care Team:  Justin Jordan MD as PCP - General  Justin Jordan MD as PCP - Family Medicine  KYM Ventura as PCP - Claims Attributed    Chief Complaint: Follow-up atrial flutter    Interval History:   Rate better controlled.  No new complaints.    Objective   Vital Signs  Temp:  [97.8 °F (36.6 °C)] 97.8 °F (36.6 °C)  Heart Rate:  [] 87  Resp:  [16-22] 16  BP: (100-127)/(68-82) 118/82    Intake/Output Summary (Last 24 hours) at 10/16/17 0859  Last data filed at 10/16/17 0143   Gross per 24 hour   Intake              300 ml   Output                0 ml   Net              300 ml     Flowsheet Rows         First Filed Value    Admission Height  61\" (154.9 cm) Documented at 10/13/2017 2134    Admission Weight  200 lb (90.7 kg) Documented at 10/13/2017 2134          General Appearance:    Alert, cooperative, in no acute distress   Head:    Normocephalic, without obvious abnormality, atraumatic       Neck:   No adenopathy, supple, no thyromegaly, no carotid bruit, no    JVD   Lungs:     Clear to auscultation bilaterally, no wheezes, rales, or     rhonchi    Heart:    Normal rate, regular rhythm,  No murmur, rub, or gallop   Chest Wall:    No abnormalities observed   Abdomen:     Normal bowel sounds, soft, non-tender, non-distended,            no rebound tenderness   Extremities:   No cyanosis, clubbing, or edema   Pulses:   Pulses palpable and equal bilaterally   Skin:   No bleeding or rash   Lymph nodes:   No cervical adenopathy   Neurologic:   Cranial nerves 2 - 12 grossly intact, sensation intact             arformoterol 15 mcg Nebulization BID - RT   aspirin  mg Oral Daily   azithromycin 500 mg Intravenous Q24H   budesonide 0.5 mg Nebulization BID - RT   busPIRone 5 mg Oral BID   ceftriaxone 1 g Intravenous Q24H   cetirizine 5 mg Oral Nightly   digoxin 125 mcg Oral Daily   enoxaparin 40 mg Subcutaneous Nightly   metoprolol succinate  mg Oral Q24H   nystatin  Topical Q12H "   predniSONE 20 mg Oral Daily With Breakfast   tamsulosin 0.4 mg Oral Daily            Results Review:      Results from last 7 days  Lab Units 10/16/17  0804   SODIUM mmol/L 145   POTASSIUM mmol/L 4.3   CHLORIDE mmol/L 103   CO2 mmol/L 33.5*   BUN mg/dL 28*   CREATININE mg/dL 0.96   GLUCOSE mg/dL 131*   CALCIUM mg/dL 8.4*       Results from last 7 days  Lab Units 10/14/17  1827 10/14/17  1502 10/13/17  2158   TROPONIN T ng/mL <0.010 <0.010 <0.010       Results from last 7 days  Lab Units 10/16/17  0804   WBC 10*3/mm3 8.27   HEMOGLOBIN g/dL 9.5*   HEMATOCRIT % 33.9*   PLATELETS 10*3/mm3 186       Results from last 7 days  Lab Units 10/15/17  0354   INR  1.19*       Results from last 7 days  Lab Units 10/15/17  0354   CHOLESTEROL mg/dL 128       Results from last 7 days  Lab Units 10/15/17  0354   MAGNESIUM mg/dL 2.1       Results from last 7 days  Lab Units 10/15/17  0354   CHOLESTEROL mg/dL 128   TRIGLYCERIDES mg/dL 87   HDL CHOL mg/dL 33*       I reviewed the patient's new clinical results.  I personally viewed and interpreted the patient's EKG/Telemetry data        Assessment/Plan   Pneumonia of right lower lobe due to infectious organism    COPD, severe    Acute on chronic diastolic heart failure    On home O2    Acute on chronic respiratory failure with hypoxia    Atrial flutter: Mild rapid ventricular rate    Non-intractable vomiting with nausea    Acute renal failure: Creatinine has improved today         Plan:     -Her ejection fraction is normal.  -She does have atrial flutter versus atrial tachycardia. Ventricular rate is better controlled today.  Continue Toprol at current dose of 200 mg daily.  In Senior low-dose digoxin at 0.125 mg by mouth daily.  -Restart home Lasix therapy  -Would not recommend anticoagulation and cardioversion.    -I will sign off.  Call with questions.

## 2017-10-16 NOTE — PROGRESS NOTES
Discharge Planning Assessment  [facility]     Patient Name: Pooja Duque  MRN: [id number]  Today's Date: 10/16/2017    Admit Date: 10/14/2017          Discharge Needs Assessment       10/16/17 1422    Living Environment    Lives With facility resident   [facility] [address] [facility]     Living Arrangements assisted living    Home Accessibility no concerns    Stair Railings at Home none    Transportation Available car;family or friend will provide    Living Environment    Provides Primary Care For no one, unable/limited ability to care for self    Quality Of Family Relationships supportive    Able to Return to Prior Living Arrangements yes    Discharge Needs Assessment    Concerns To Be Addressed basic needs concerns    Readmission Within The Last 30 Days no previous admission in last 30 days    Anticipated Changes Related to Illness inability to care for self    Equipment Currently Used at Home oxygen;nebulizer;wheelchair, motorized;rollator;bath bench;grab bar    Equipment Needed After Discharge none    Discharge Facility/Level Of Care Needs assisted living facility    Discharge Disposition skilled nursing facility   return to [facility] or SNF    Discharge Planning Comments Return to [facility] or SNF- undetermined            Discharge Plan       10/16/17 1424    Case Management/Social Work Plan    Plan Undetermined-  Return to [facility] or SNF- awaiting return call from family    Patient/Family In Agreement With Plan yes    Additional Comments Introduced self and explained role of CCP, IMM checked and facesheet verified with patient, who is alert and oriented x 4, at chairside.  Patient states she lives at [facility] on [facility] and they assist her with bathing, medications, provide meals, housekeeping and transportation.  Patient states she currently has oxygen 3L continuous from [employer], nebulizer, bath bench, grab bars,  rollator walker and motorized wheelchair and denies DME needs at discharge.  Patient states she has never been to SNF and would be agreeable if her family thinks so and gave CCP permission to call daughter-in-lawZuleima at [phone](left voicemail to return CCP call with phone number and hours).  Patient states she uses [facility] on [address] and [place], [place] and denies any issues with affording or obtaining medications.  CCP will continue to follow and assist as needed.....Dee Dee Ramires RN,CCP         Discharge Placement     No information found                Demographic Summary       10/16/17 1419    Referral Information    Admission Type inpatient    Arrived From home or self-care    Contact Information    Permission Granted to Share Information With family/designee   Zuleima Duque(daughter-in-law) [phone]    Primary Care Physician Information    Name Justin Jordan MD            Functional Status       10/16/17 1421    Functional Status Current    Ambulation 1-->assistive equipment    Transferring 1-->assistive equipment    Toileting 1-->assistive equipment    Bathing 2-->assistive person    Dressing 2-->assistive person    Eating 0-->independent    Change in Functional Status Since Onset of Current Illness/Injury no    Functional Status Prior    Ambulation 1-->assistive equipment    Transferring 1-->assistive equipment    Toileting 1-->assistive equipment    Bathing 2-->assistive person    Dressing 2-->assistive person    Eating 0-->independent    Communication 0-->understands/communicates without difficulty    IADL    Medications assistive person    Meal Preparation assistive person    Housekeeping assistive person    Laundry assistive person    Shopping assistive person    Oral Care independent    Activity Tolerance    Current Activity Limitations none    Usual Activity Tolerance fair    Current Activity Tolerance fair    Cognitive/Perceptual/Developmental     Current Mental Status/Cognitive Functioning no deficits noted    Recent Changes in Mental Status/Cognitive Functioning no changes    Developmental Stage (Eriksson's Stages of Development) Stage 8 (65 years-death/Late Adulthood) Integrity vs. Despair            Psychosocial     None            Abuse/Neglect     None            Legal     None            Substance Abuse     None            Patient Forms     None          Dee Dee Ramires, RN

## 2017-10-16 NOTE — THERAPY EVALUATION
Acute Care - Physical Therapy Initial Evaluation  [facility]     Patient Name: Pooja Duque  : 1931  MRN: [id number]  Today's Date: 10/16/2017   Onset of Illness/Injury or Date of Surgery Date: 10/14/17     Referring Physician: Edwar      Admit Date: 10/14/2017     Visit Dx:    ICD-10-CM ICD-9-CM   1. Pneumonia of right lower lobe due to infectious organism J18.1 486   2. Atrial flutter, paroxysmal I48.92 427.32   3. Decreased mobility R26.89 781.99     Patient Active Problem List   Diagnosis   • Abnormal weight gain   • Acute upper respiratory infection   • [place] esophagus   • Benign colonic polyp   • Atherosclerosis of coronary artery   • COPD, severe   • Chest pressure   • Acute on chronic diastolic heart failure   • CN (constipation)   • Acute exacerbation of chronic obstructive airways disease   • Cough   • Bleeding from the nose   • Bloodgood disease   • BP (high blood pressure)   • Hypoxia   • Eczema intertrigo   • Leg cramp   • Chronic low back pain   • Muscle spasms of neck   • OP (osteoporosis)   • Left leg pain   • PND (post-nasal drip)   • Right lower lobe pneumonia   • Basal cell papilloma   • Superficial thrombophlebitis of leg   • On home O2   • Intractable vomiting with nausea   • Dehydration   • Pain and swelling of right lower leg   • Cellulitis of left leg   • Left leg cellulitis   • Acute on chronic respiratory failure with hypoxia   • Hospital discharge follow-up   • Bilateral leg edema   • Anemia   • Abdominal bloating   • Medicare annual wellness visit, initial   • Wheelchair bound   • Pneumonia of right lower lobe due to infectious organism   • Atrial flutter   • Non-intractable vomiting with nausea   • Acute renal failure     Past Medical History:   Diagnosis Date   • Anemia    • Atherosclerosis of coronary artery 2016   • [place] esophagus 2016   • Cancer    • Candidiasis    • CHF (congestive heart failure)    • Chronic diastolic heart failure 2016   • Colon  polyp    • COPD (chronic obstructive pulmonary disease)    • COPD, severe 2/1/2016   • FLOYD (dyspnea on exertion)    • Eczema intertrigo 2/1/2016   • Esophageal reflux    • Fracture, carpal bone    • Hiatal hernia    • Hypertension    • Low back pain    • Macular degeneration    • On home O2 2/1/2016   • Osteopenia    • Osteoporosis    • PND (post-nasal drip) 2/1/2016   • Pneumonia    • Superficial thrombophlebitis of leg 2/1/2016     Past Surgical History:   Procedure Laterality Date   • APPENDECTOMY     • FOOT SURGERY Bilateral    • VERTEBRAL AUGMENTATION  10/02/2015    percutaneous vertebral augmentation kyphoplasty- T8- Dr. Alejandro    • WRIST SURGERY Left           PT ASSESSMENT (last 72 hours)      PT Evaluation       10/16/17 0900 10/14/17 0613    Rehab Evaluation    Document Type evaluation  -EE     Subjective Information agree to therapy;complains of;weakness;dyspnea  -EE     Patient Effort, Rehab Treatment good  -EE     Symptoms Noted During/After Treatment shortness of breath;significant change in vital signs  -EE     General Information    Onset of Illness/Injury or Date of Surgery Date 10/14/17  -EE     Referring Physician Edwar  -EE     General Observations Pt supine in bed in no acute distress  -EE     Pertinent History Of Current Problem PNA  -EE     Precautions/Limitations fall precautions;oxygen therapy device and L/min   4 L O2/min per nc  -EE     Prior Level of Function independent:;all household mobility;community mobility  -EE     Equipment Currently Used at Home oxygen;rollator;wheelchair, motorized   scooter for community distances  -EE     Plans/Goals Discussed With patient;agreed upon  -EE     Barriers to Rehab none identified  -EE     Living Environment    Lives With facility resident  -EE     Living Arrangements assisted living  -EE     Home Accessibility no concerns  -EE     Transportation Available  car;family or friend will provide  -LH    Clinical Impression    Patient/Family Goals  Statement Go home  -EE     Criteria for Skilled Therapeutic Interventions Met yes;treatment indicated  -EE     Pathology/Pathophysiology Noted (Describe Specifically for Each System) musculoskeletal  -EE     Impairments Found (describe specific impairments) gait, locomotion, and balance  -EE     Rehab Potential good, to achieve stated therapy goals  -EE     Vital Signs    Pre SpO2 (%) 88  -EE     O2 Delivery Pre Treatment supplemental O2  -EE     Intra SpO2 (%) 79   immediately after ambulating  -EE     O2 Delivery Intra Treatment supplemental O2  -EE     Post SpO2 (%) 88  -EE     O2 Delivery Post Treatment supplemental O2  -EE     Pre Patient Position Supine  -EE     Intra Patient Position Standing  -EE     Post Patient Position Sitting  -EE     Recovery Time 3 min  -EE     Pain Assessment    Pain Assessment No/denies pain  -EE     Cognitive Assessment/Intervention    Current Cognitive/Communication Assessment functional  -EE     Orientation Status oriented x 4  -EE     Follows Commands/Answers Questions 100% of the time  -EE     Personal Safety WNL/WFL  -EE     Personal Safety Interventions fall prevention program maintained;gait belt;muscle strengthening facilitated;nonskid shoes/slippers when out of bed;supervised activity  -EE     ROM (Range of Motion)    General ROM no range of motion deficits identified  -EE     MMT (Manual Muscle Testing)    General MMT Assessment lower extremity strength deficits identified  -EE     General MMT Assessment Detail B LEs grossly 4-/5  -EE     Bed Mobility, Assessment/Treatment    Bed Mobility, Assistive Device head of bed elevated  -EE     Bed Mob, Supine to Sit, [place] supervision required  -EE     Bed Mob, Sit to Supine, [place] not tested   up in chair  -EE     Transfer Assessment/Treatment    Transfers, Sit-Stand [place] contact guard assist  -EE     Transfers, Stand-Sit [place] contact guard assist  -EE     Transfers, Sit-Stand-Sit, Assist Device  rolling walker   rollator  -EE     Transfer, Impairments strength decreased;impaired balance  -EE     Gait Assessment/Treatment    Gait, [place] contact guard assist  -EE     Gait, Assistive Device rollator  -EE     Gait, Distance (Feet) 15  -EE     Gait, Gait Deviations forward flexed posture;lynda decreased;step length decreased  -EE     Gait, Safety Issues step length decreased;supplemental O2  -EE     Gait, Impairments strength decreased;impaired balance  -EE     Gait, Comment SOA limiting gait distance  -EE     Motor Skills/Interventions    Additional Documentation Balance Skills Training (Group)  -EE     Balance Skills Training    Sitting-Level of Assistance Independent  -EE     Standing-Level of Assistance Contact guard  -EE     Static Standing Balance Support assistive device  -EE     Gait Balance-Level of Assistance Contact guard  -EE     Gait Balance Support assistive device  -EE     Positioning and Restraints    Pre-Treatment Position in bed  -EE     Post Treatment Position chair  -EE     In Chair reclined;call light within reach;encouraged to call for assist;exit alarm on;legs elevated  -       10/14/17 0546 10/14/17 0542    General Information    Equipment Currently Used at Home  rollator;wheelchair, motorized;nebulizer;oxygen  -    Living Environment    Lives With facility resident  -     Living Arrangements assisted living  -     Home Accessibility no concerns  -     Stair Railings at Home none  -     Type of Financial/Environmental Concern none  -     Transportation Available car;family or friend will provide  -       User Key  (r) = Recorded By, (t) = Taken By, (c) = Cosigned By    Initials Name Provider Type    VLADIMIR Gautam PT Physical Therapist     Becka Khalil RN Registered Nurse          Physical Therapy Education     Title: PT OT SLP Therapies (Active)     Topic: Physical Therapy (Active)     Point: Mobility training (Done)    Learning Progress Summary     Learner Readiness Method Response Comment Documented by Status   Patient Acceptance E,TB VU,NR  EE 10/16/17 0931 Done                      User Key     Initials Effective Dates Name Provider Type Discipline    EE 12/01/15 -  Nicole Gautam, PT Physical Therapist PT                PT Recommendation and Plan  Anticipated Discharge Disposition: home with assist  Planned Therapy Interventions: balance training, bed mobility training, gait training, home exercise program, patient/family education, strengthening, transfer training  PT Frequency: daily  Plan of Care Review  Plan Of Care Reviewed With: patient  Outcome Summary/Follow up Plan: Pt presents with PNA, upon exam demonstrates generalized weakness, impaired balance, decreased endurance, and decreased aerobic capacity limiting mobility. Pt currently requiring assist of one for safety; gait distance limited by SOA and decreased O2 sats with exertion. Pt would benefit from PT to address impairments and assist w/return to PLOF.           IP PT Goals       10/16/17 0932          Bed Mobility PT LTG    Bed Mobility PT LTG, Date Established 10/16/17  -EE      Bed Mobility PT LTG, Time to Achieve 1 wk  -EE      Bed Mobility PT LTG, Activity Type all bed mobility  -EE      Bed Mobility PT LTG, [place] Level independent  -EE      Transfer Training PT LTG    Transfer Training PT LTG, Date Established 10/16/17  -EE      Transfer Training PT LTG, Time to Achieve 1 wk  -EE      Transfer Training PT LTG, Activity Type all transfers  -EE      Transfer Training PT LTG, [place] Level supervision required  -EE      Transfer Training PT LTG, Assist Device walker, rolling   rollator  -EE      Gait Training PT LTG    Gait Training Goal PT LTG, Date Established 10/16/17  -EE      Gait Training Goal PT LTG, Time to Achieve 1 wk  -EE      Gait Training Goal PT LTG, [place] Level supervision required  -EE      Gait Training Goal PT LTG, Assist Device walker, rolling   rollator   -EE      Gait Training Goal PT LTG, Distance to Achieve 50  -EE      Gait Training Goal PT LTG, Additional Goal with O2 sats >= 88%   -EE      Patient Education PT LTG    Patient Education PT LTG, Date Established 10/16/17  -EE      Patient Education PT LTG, Time to Achieve 1 wk  -EE      Patient Education PT LTG, Education Type HEP  -EE      Patient Education PT LTG, Education Understanding demonstrate adequately  -EE        User Key  (r) = Recorded By, (t) = Taken By, (c) = Cosigned By    Initials Name Provider Type    VLADIMIR Gautam PT Physical Therapist                Outcome Measures       10/16/17 0900          How much help from another person do you currently need...    Turning from your back to your side while in flat bed without using bedrails? 4  -EE      Moving from lying on back to sitting on the side of a flat bed without bedrails? 3  -EE      Moving to and from a bed to a chair (including a wheelchair)? 3  -EE      Standing up from a chair using your arms (e.g., wheelchair, bedside chair)? 3  -EE      Climbing 3-5 steps with a railing? 2  -EE      To walk in hospital room? 3  -EE      AM-PAC 6 Clicks Score 18  -EE      Functional Assessment    Outcome Measure Options AM-PAC 6 Clicks Basic Mobility (PT)  -EE        User Key  (r) = Recorded By, (t) = Taken By, (c) = Cosigned By    Initials Name Provider Type    VLADIMIR Gautam PT Physical Therapist           Time Calculation:         PT Charges       10/16/17 0934          Time Calculation    Start Time 0900  -EE      Stop Time 0924  -EE      Time Calculation (min) 24 min  -EE      PT Received On 10/16/17  -EE      PT - Next Appointment 10/17/17  -EE      PT Goal Re-Cert Due Date 10/23/17  -EE        User Key  (r) = Recorded By, (t) = Taken By, (c) = Cosigned By    Initials Name Provider Type    VLADIMIR Gautam PT Physical Therapist          Therapy Charges for Today     Code Description Service Date Service Provider Modifiers Qty    42284208461  PT  EVAL MOD COMPLEXITY 2 10/16/2017 Nicole Gautam, PT GP 1    24877881673 HC PT THER PROC EA 15 MIN 10/16/2017 Nicole Gautam, PT GP 1          PT G-Codes  Outcome Measure Options: AM-PAC 6 Clicks Basic Mobility (PT)      Nicole Gautam, PT  10/16/2017

## 2017-10-16 NOTE — PLAN OF CARE
Problem: Patient Care Overview (Adult)  Goal: Plan of Care Review  Outcome: Ongoing (interventions implemented as appropriate)    10/16/17 0422   Coping/Psychosocial Response Interventions   Plan Of Care Reviewed With patient   Patient Care Overview   Progress improving   Outcome Evaluation   Outcome Summary/Follow up Plan PT has been sleeping throughout the shift. PTs HR has been in 90s-110s A flutter. BPs have been stable. PT still recieving antibiotics. Will continue to monitor.        Goal: Adult Individualization and Mutuality  Outcome: Ongoing (interventions implemented as appropriate)  Goal: Discharge Needs Assessment  Outcome: Ongoing (interventions implemented as appropriate)    Problem: Fall Risk (Adult)  Goal: Absence of Falls  Outcome: Ongoing (interventions implemented as appropriate)    Problem: Pneumonia (Adult)  Goal: Signs and Symptoms of Listed Potential Problems Will be Absent or Manageable (Pneumonia)  Outcome: Ongoing (interventions implemented as appropriate)

## 2017-10-17 NOTE — THERAPY TREATMENT NOTE
Acute Care - Physical Therapy Treatment Note  [facility]     Patient Name: Pooja Duque  : 1931  MRN: [id number]  Today's Date: 10/17/2017  Onset of Illness/Injury or Date of Surgery Date: 10/14/17     Referring Physician: Edwar    Admit Date: 10/14/2017    Visit Dx:    ICD-10-CM ICD-9-CM   1. Pneumonia of right lower lobe due to infectious organism J18.1 486   2. Atrial flutter, paroxysmal I48.92 427.32   3. Decreased mobility R26.89 781.99     Patient Active Problem List   Diagnosis   • Abnormal weight gain   • Acute upper respiratory infection   • [place] esophagus   • Benign colonic polyp   • Atherosclerosis of coronary artery   • COPD, severe   • Chest pressure   • Acute on chronic diastolic heart failure   • CN (constipation)   • Acute exacerbation of chronic obstructive airways disease   • Bleeding from the nose   • Bloodgood disease   • Hypertension   • Hypoxia   • Eczema intertrigo   • Leg cramp   • Chronic low back pain   • OP (osteoporosis)   • Left leg pain   • Basal cell papilloma   • Superficial thrombophlebitis of leg   • On home O2   • Intractable vomiting with nausea   • Acute on chronic respiratory failure with hypoxia   • Hospital discharge follow-up   • Bilateral leg edema   • Anemia   • Abdominal bloating   • Medicare annual wellness visit, initial   • Wheelchair bound   • Pneumonia of right lower lobe due to infectious organism   • Atrial flutter   • Non-intractable vomiting with nausea   • Acute renal failure               Adult Rehabilitation Note       10/17/17 1101          Rehab Assessment/Intervention    Discipline physical therapist  -AR      Document Type therapy note (daily note)  -AR      Subjective Information agree to therapy  -AR      Patient Effort, Rehab Treatment good  -AR      Symptoms Noted During/After Treatment shortness of breath  -AR      Precautions/Limitations fall precautions;oxygen therapy device and L/min  -AR      Recorded by [AR] Belkys Mendoza, PT       Vital Signs    O2 Delivery Pre Treatment supplemental O2  -AR      Recorded by [AR] Belkys Mendoza PT      Pain Assessment    Pain Assessment No/denies pain  -AR      Recorded by [AR] Belkys Mendoza PT      Cognitive Assessment/Intervention    Current Cognitive/Communication Assessment functional  -AR      Orientation Status oriented x 4  -AR      Recorded by [AR] Belkys Mendoza PT      Bed Mobility, Assessment/Treatment    Bed Mob, Supine to Sit, [place] not tested  -AR      Bed Mob, Sit to Supine, [place] not tested  -AR      Recorded by [AR] Belkys Mendoza PT      Transfer Assessment/Treatment    Transfers, Sit-Stand [place] contact guard assist;minimum assist (75% patient effort)   min A 2nd attempt  -AR      Transfers, Stand-Sit [place] contact guard assist  -AR      Transfers, Sit-Stand-Sit, Assist Device --   rollator  -AR      Recorded by [AR] Belkys Mendoza PT      Gait Assessment/Treatment    Gait, [place] Level contact guard assist  -AR      Gait, Assistive Device rollator  -AR      Gait, Distance (Feet) 25  -AR      Gait, Gait Deviations lyn[place] decreased;decreased heel strike  -AR      Gait, Safety Issues step length decreased;weight-shifting ability decreased  -AR      Gait, Impairments strength decreased;impaired balance  -AR      Gait, Comment 1 sitting rest break  -AR      Recorded by [AR] Belkys Mendoza PT      Positioning and Restraints    Pre-Treatment Position in bed  -AR      Post Treatment Position chair  -AR      In Chair reclined;sitting;call light within reach;encouraged to call for assist;exit alarm on  -AR      Recorded by [AR] Belkys Mendoza PT        User Key  (r) = Recorded By, (t) = Taken By, (c) = Cosigned By    Initials Name Effective Dates    AR Belkys Mendoza PT 06/27/16 -                 IP PT Goals       10/16/17 0932          Bed Mobility PT LTG    Bed Mobility PT LTG, Date Established 10/16/17  -EE      Bed Mobility PT LTG, Time to  Achieve 1 wk  -EE      Bed Mobility PT LTG, Activity Type all bed mobility  -EE      Bed Mobility PT LTG, [place] Level independent  -EE      Transfer Training PT LTG    Transfer Training PT LTG, Date Established 10/16/17  -EE      Transfer Training PT LTG, Time to Achieve 1 wk  -EE      Transfer Training PT LTG, Activity Type all transfers  -EE      Transfer Training PT LTG, [place] Level supervision required  -EE      Transfer Training PT LTG, Assist Device walker, rolling   rollator  -EE      Gait Training PT LTG    Gait Training Goal PT LTG, Date Established 10/16/17  -EE      Gait Training Goal PT LTG, Time to Achieve 1 wk  -EE      Gait Training Goal PT LTG, [place] Level supervision required  -EE      Gait Training Goal PT LTG, Assist Device walker, rolling   rollator  -EE      Gait Training Goal PT LTG, Distance to Achieve 50  -EE      Gait Training Goal PT LTG, Additional Goal with O2 sats >= 88%   -EE      Patient Education PT LTG    Patient Education PT LTG, Date Established 10/16/17  -EE      Patient Education PT LTG, Time to Achieve 1 wk  -EE      Patient Education PT LTG, Education Type HEP  -EE      Patient Education PT LTG, Education Understanding demonstrate adequately  -EE        User Key  (r) = Recorded By, (t) = Taken By, (c) = Cosigned By    Initials Name Provider Type    VLADIMIR Gautam, PT Physical Therapist          Physical Therapy Education     Title: PT OT SLP Therapies (Active)     Topic: Physical Therapy (Active)     Point: Mobility training (Active)    Learning Progress Summary    Learner Readiness Method Response Comment Documented by Status   Patient Acceptance E NR  AR 10/17/17 1102 Active    Acceptance E,TB VU,NR  EE 10/16/17 0931 Done               Point: Home exercise program (Active)    Learning Progress Summary    Learner Readiness Method Response Comment Documented by Status   Patient Acceptance E NR  AR 10/17/17 1102 Active               Point: Body mechanics  (Active)    Learning Progress Summary    Learner Readiness Method Response Comment Documented by Status   Patient Acceptance E NR  AR 10/17/17 1102 Active               Point: Precautions (Active)    Learning Progress Summary    Learner Readiness Method Response Comment Documented by Status   Patient Acceptance E NR  AR 10/17/17 1102 Active                      User Key     Initials Effective Dates Name Provider Type Discipline    EE 12/01/15 -  Nicole Gautam, PT Physical Therapist PT    AR 06/27/16 -  Belkys Mendoza, PT Physical Therapist PT                    PT Recommendation and Plan  Anticipated Discharge Disposition: skilled nursing facility  Planned Therapy Interventions: balance training, bed mobility training, gait training, home exercise program, patient/family education, strengthening, transfer training  PT Frequency: daily  Plan of Care Review  Plan Of Care Reviewed With: patient  Outcome Summary/Follow up Plan: Slowly improving endurance during PT, ambulated 25' w/ rollator and contact assist.  Few verbal instructions for safety concerns using rollator.  May benefit from DC to [facility].            Outcome Measures       10/17/17 1100 10/16/17 0900       How much help from another person do you currently need...    Turning from your back to your side while in flat bed without using bedrails? 4  -AR 4  -EE     Moving from lying on back to sitting on the side of a flat bed without bedrails? 3  -AR 3  -EE     Moving to and from a bed to a chair (including a wheelchair)? 3  -AR 3  -EE     Standing up from a chair using your arms (e.g., wheelchair, bedside chair)? 3  -AR 3  -EE     Climbing 3-5 steps with a railing? 2  -AR 2  -EE     To walk in hospital room? 3  -AR 3  -EE     AM-PAC 6 Clicks Score 18  -AR 18  -EE     Functional Assessment    Outcome Measure Options  AM-PAC 6 Clicks Basic Mobility (PT)  -EE       User Key  (r) = Recorded By, (t) = Taken By, (c) = Cosigned By    Initials Name Provider Type    EE  Nicole Gautam, PT Physical Therapist    AR Belkys Mendoza PT Physical Therapist           Time Calculation:         PT Charges       10/17/17 1103          Time Calculation    Start Time 1047  -AR      Stop Time 1103  -AR      Time Calculation (min) 16 min  -AR      PT Received On 10/17/17  -AR      PT - Next Appointment 10/18/17  -AR        User Key  (r) = Recorded By, (t) = Taken By, (c) = Cosigned By    Initials Name Provider Type    AR Belkys Mendoza PT Physical Therapist          Therapy Charges for Today     Code Description Service Date Service Provider Modifiers Qty    33868615139 HC PT THER PROC EA 15 MIN 10/17/2017 Belkys Mendoza PT GP 1          PT G-Codes  Outcome Measure Options: AM-PAC 6 Clicks Basic Mobility (PT)    Belkys Mendoza PT  10/17/2017

## 2017-10-17 NOTE — CONSULTS
Consult Note    Dr Alvaro Dominguez. MD [facility]  Pulmonary/Critical Care/Sleep Medicine    Pooja Duque  11/27/1931  female  85 y.o.    PCP: Justin Jordan MD     Reason for consult:  Pneumonia    HPI:   This is 85 y.o. old female admitted on 10/14/2017 with a history of having shortness breath for almost a week.  She is well-known to me with a history of chronic hypoxic respiratory failure who is on 3 L of oxygen and also COPD.  She reports that she had some productive cough and weakness.  She feels much better.      Past Medical History:   Diagnosis Date   • Anemia    • Atherosclerosis of coronary artery 2/1/2016   • [place] esophagus 2/1/2016   • Cancer    • Candidiasis    • CHF (congestive heart failure)    • Chronic diastolic heart failure 2/1/2016   • Colon polyp    • COPD (chronic obstructive pulmonary disease)    • COPD, severe 2/1/2016   • FLOYD (dyspnea on exertion)    • Eczema intertrigo 2/1/2016   • Esophageal reflux    • Fracture, carpal bone    • Hiatal hernia    • Hypertension    • Low back pain    • Macular degeneration    • On home O2 2/1/2016   • Osteopenia    • Osteoporosis    • PND (post-nasal drip) 2/1/2016   • Pneumonia    • Superficial thrombophlebitis of leg 2/1/2016     Past Surgical History:   Procedure Laterality Date   • APPENDECTOMY     • FOOT SURGERY Bilateral    • VERTEBRAL AUGMENTATION  10/02/2015    percutaneous vertebral augmentation kyphoplasty- T8- Dr. Alejandro    • WRIST SURGERY Left      No current facility-administered medications on file prior to encounter.      Current Outpatient Prescriptions on File Prior to Encounter   Medication Sig Dispense Refill   • albuterol (PROVENTIL HFA) 108 (90 BASE) MCG/ACT inhaler Inhale 2 puffs Every 4 (Four) Hours As Needed for wheezing.     • aspirin  MG tablet TAKE 1 TABLET BY MOUTH DAILY 30 tablet 2   • BROVANA 15 MCG/2ML nebulizer solution INHALE ONE VIAL BY MOUTH TWICE A DAY 60 mL 0   • budesonide (PULMICORT) 0.5 MG/2ML  nebulizer solution USE 1 VIAL PER NEBULIZER TWO TIMES A DAY (MAY MIX WITH BROVANA IN THE NEBULIZER) 120 mL 1   • busPIRone (BUSPAR) 5 MG tablet Take 1 tablet by mouth 2 (Two) Times a Day. 60 tablet 3   • calcium carbonate-vitamin d 600-400 MG-UNIT per tablet TAKE 1 TABLET BY MOUTH DAILY 30 tablet 2   • furosemide (LASIX) 40 MG tablet TAKE 1 TABLET BY MOUTH DAILY 30 tablet 2   • metoprolol succinate XL (TOPROL-XL) 100 MG 24 hr tablet TAKE 1 TABLET BY MOUTH DAILY 30 tablet 2   • Multiple Vitamin (ONE DAILY) tablet TAKE ONE TABLET BY MOUTH DAILY DO NOT TAKE WITH LEVOTHRYOXIN MUST TAKE 2 HOURS APART 30 tablet 2   • pantoprazole (PROTONIX) 40 MG EC tablet @@ TAKE 1 TABLET BY MOUTH DAILY 30 tablet 2   • Multiple Vitamins-Minerals (CENTRUM SILVER) tablet Take by mouth.       Allergies as of 10/13/2017 - Kurt as Reviewed 10/13/2017   Allergen Reaction Noted   • Codeine  03/03/2016   • Morphine and related  03/03/2016   • Pravastatin  01/20/2016     Social History   Substance Use Topics   • Smoking status: Former Smoker     Packs/day: 1.00     Years: 40.00     Types: Cigarettes   • Smokeless tobacco: Never Used   • Alcohol use No     Family History   Problem Relation Age of Onset   • Alzheimer's disease Mother    • Lung cancer Sister    • Diabetes Brother        arformoterol 15 mcg Nebulization BID - RT   aspirin  mg Oral Daily   [START ON 10/18/2017] azithromycin 500 mg Oral Daily   budesonide 0.5 mg Nebulization BID - RT   busPIRone 5 mg Oral BID   ceftriaxone 1 g Intravenous Q24H   cetirizine 5 mg Oral Nightly   digoxin 125 mcg Oral Daily   enoxaparin 40 mg Subcutaneous Nightly   furosemide 40 mg Intravenous Q12H   furosemide 40 mg Oral Daily   lactobacillus acidophilus 1 capsule Oral Daily   metoprolol succinate  mg Oral Q24H   nystatin  Topical Q12H   oxymetazoline 2 spray Each Nare BID   pantoprazole 40 mg Oral Q AM   predniSONE 20 mg Oral Daily With Breakfast   tamsulosin 0.4 mg Oral Daily       Review of  "Systems   Constitution: Negative for chills and fever.   HENT: Negative for congestion, nosebleeds and stridor.    Eyes: Negative for blurred vision and discharge.   Cardiovascular: Positive for leg swelling. Negative for chest pain and cyanosis.   Respiratory: Positive for cough, shortness of breath and sputum production. Negative for hemoptysis, snoring and wheezing.    Endocrine: Negative for cold intolerance and heat intolerance.   Skin: Negative for itching and rash.   Musculoskeletal: Negative for falls, neck pain and stiffness.   Gastrointestinal: Negative for diarrhea, heartburn, jaundice and vomiting.   Genitourinary: Negative for dysuria and hematuria.   Neurological: Negative for dizziness, headaches, numbness and seizures.   Psychiatric/Behavioral: Negative for depression and hallucinations. The patient does not have insomnia.        /69 (BP Location: Right arm, Patient Position: Sitting)  Pulse 82  Temp 97.7 °F (36.5 °C) (Oral)   Resp 24  Ht 61\" (154.9 cm)  Wt 217 lb 11.2 oz (98.7 kg)  SpO2 92%  BMI 41.13 kg/m2  I/O last 3 completed shifts:  In: 650 [IV Piggyback:650]  Out: -   I/O this shift:  In: -   Out: 200 [Urine:200]    Physical Exam   Constitutional: She is oriented to person, place, and time. She appears well-developed and well-nourished. No distress. Nasal cannula in place.   HENT:   Head: Normocephalic and atraumatic.   Eyes: EOM are normal. Pupils are equal, round, and reactive to light. Right pupil is reactive. Left pupil is reactive.   Neck: Trachea normal and normal range of motion. Neck supple. No JVD present. No thyromegaly present.   Cardiovascular: Normal rate, regular rhythm and normal heart sounds.    No murmur heard.  Pulmonary/Chest: Effort normal. No accessory muscle usage. No respiratory distress. She has decreased breath sounds. She has no wheezes. She has no rhonchi. She has no rales.   Abdominal: Soft. Bowel sounds are normal. She exhibits no distension and no " mass. There is no tenderness.   Musculoskeletal: Normal range of motion. She exhibits edema. She exhibits no deformity.   Neurological: She is alert and oriented to person, place, and time. No cranial nerve deficit. She exhibits normal muscle tone.   Skin: Skin is warm and dry. No rash noted. She is not diaphoretic. No erythema. No pallor.   Psychiatric: Her behavior is normal. Her mood appears anxious.         Results from last 7 days  Lab Units 10/16/17  0804   WBC 10*3/mm3 8.27   HEMOGLOBIN g/dL 9.5*   HEMATOCRIT % 33.9*   PLATELETS 10*3/mm3 186       Results from last 7 days  Lab Units 10/16/17  0804  10/13/17  2158   SODIUM mmol/L 145  < > 143   POTASSIUM mmol/L 4.3  < > 4.2   CHLORIDE mmol/L 103  < > 99   CO2 mmol/L 33.5*  < > 35.3*   BUN mg/dL 28*  < > 26*   CREATININE mg/dL 0.96  < > 1.11*   CALCIUM mg/dL 8.4*  < > 8.7   BILIRUBIN mg/dL  --   --  0.7   ALK PHOS U/L  --   --  82   ALT (SGPT) U/L  --   --  18   AST (SGOT) U/L  --   --  22   GLUCOSE mg/dL 131*  < > 111*   < > = values in this interval not displayed.        Results from last 7 days  Lab Units 10/15/17  0354   INR  1.19*       CXR  Right lower lobe infiltrate with small pleural effusion     Assessment and plan:     · Acute on chronic respiratory failure.  Her oxygen requirement has increased continue oxygen.  · Right lower lobe pneumonia I do agree with antibiotics  · COPD with exacerbation secondary to pneumonia, continue bronchodilators along with low-dose prednisone  · Diastolic heart failure patient has 1 plus pitting edema along with elevated pro BNP.                                                   Alvaro Dominguez MD, [facility]  Pulmonary, Critical Care and Sleep Medicine.  [facility]    10/17/2017 ,    EMR Dragon/Transcription disclaimer:   Much of this encounter note is an electronic transcription/translation of spoken language to printed text. The electronic translation of spoken language may permit erroneous,  or at times, nonsensical words or phrases to be inadvertently transcribed; Although I have reviewed the note for such errors, some may still exist.

## 2017-10-17 NOTE — PLAN OF CARE
Problem: Patient Care Overview (Adult)  Goal: Plan of Care Review  Outcome: Ongoing (interventions implemented as appropriate)    10/17/17 0341   Coping/Psychosocial Response Interventions   Plan Of Care Reviewed With patient   Patient Care Overview   Progress improving   Outcome Evaluation   Outcome Summary/Follow up Plan Lasix 40 mg po. VSS. no c/o voiced. Will continue to monitor.         Problem: Fall Risk (Adult)  Goal: Absence of Falls  Outcome: Ongoing (interventions implemented as appropriate)    10/17/17 0341   Fall Risk (Adult)   Absence of Falls making progress toward outcome         Problem: Pneumonia (Adult)  Goal: Signs and Symptoms of Listed Potential Problems Will be Absent or Manageable (Pneumonia)  Outcome: Ongoing (interventions implemented as appropriate)    10/17/17 0341   Pneumonia   Problems Assessed (Pneumonia) fluid/electrolyte imbalance;progression of infection;respiratory compromise   Problems Present (Pneumonia) none

## 2017-10-17 NOTE — PLAN OF CARE
Problem: Patient Care Overview (Adult)  Goal: Plan of Care Review  Outcome: Ongoing (interventions implemented as appropriate)    10/17/17 1947   Coping/Psychosocial Response Interventions   Plan Of Care Reviewed With patient   Patient Care Overview   Progress improving   Outcome Evaluation   Outcome Summary/Follow up Plan Pt has activity intolerance with O2 sats dropping to mid 70s with minimal activity. Currently on 4.5 L HF O2. Pt. on Lasix daily with one time dose, IV given today per MD order.. Zithromax antibiotic changed from IV to oral. No complaints of pain. Vitals otherwise stable. Will continue to monitor.

## 2017-10-17 NOTE — PLAN OF CARE
Problem: Patient Care Overview (Adult)  Goal: Plan of Care Review    10/17/17 1103   Coping/Psychosocial Response Interventions   Plan Of Care Reviewed With patient   Outcome Evaluation   Outcome Summary/Follow up Plan Slowly improving endurance during PT, ambulated 25' w/ rollator and contact assist. Few verbal instructions for safety concerns using rollator. May benefit from DC to [facility].

## 2017-10-17 NOTE — PLAN OF CARE
Problem: Patient Care Overview (Adult)  Goal: Plan of Care Review  Outcome: Ongoing (interventions implemented as appropriate)    10/16/17 0758   Coping/Psychosocial Response Interventions   Plan Of Care Reviewed With patient   Patient Care Overview   Progress improving   Outcome Evaluation   Outcome Summary/Follow up Plan Pt started on Lasix today as well as protonix and probiotic. Pt. with no complaints of pain. Seen by physical therapy and up to chair most of day. Vitals stable. Will continue to monitor.

## 2017-10-18 NOTE — PLAN OF CARE
Problem: Patient Care Overview (Adult)  Goal: Plan of Care Review  Outcome: Ongoing (interventions implemented as appropriate)    10/18/17 2088   Coping/Psychosocial Response Interventions   Plan Of Care Reviewed With patient   Patient Care Overview   Progress improving   Outcome Evaluation   Outcome Summary/Follow up Plan pt continues 4LNC; aflutter on the monitor; d/c afrin; soa on minimal exertion; increased buspar and added ativan for anxiety; applied nystatin under breasts; full code; continue to monitor         Problem: Pneumonia (Adult)  Goal: Signs and Symptoms of Listed Potential Problems Will be Absent or Manageable (Pneumonia)  Outcome: Ongoing (interventions implemented as appropriate)

## 2017-10-18 NOTE — PROGRESS NOTES
Daily Progress Note.     10/18/2017    Pooja Duque ,  85 y.o. ,female  [address]      Brief problem (summary)  · Acute on chronic respiratory failure. .  · Right lower lobe pneumonia   · COPD with exacerbation secondary to pneumonia,  · Diastolic heart failure      Today, patient reports that she is feeling lot better.  She is sitting in a chair and having dinner.  She also walked to the bathroom today still complains of urinary urgency    PMS/FH/SH: reviewed and unchanged.  Medications:     arformoterol 15 mcg Nebulization BID - RT   aspirin  mg Oral Daily   azithromycin 500 mg Oral Daily   budesonide 0.5 mg Nebulization BID - RT   busPIRone 7.5 mg Oral BID   ceftriaxone 1 g Intravenous Q24H   cetirizine 5 mg Oral Nightly   digoxin 125 mcg Oral Daily   enoxaparin 40 mg Subcutaneous Nightly   furosemide 40 mg Oral Daily   lactobacillus acidophilus 1 capsule Oral Daily   LORazepam 0.25 mg Oral Daily   metoprolol succinate  mg Oral Q24H   nystatin  Topical Q12H   pantoprazole 40 mg Oral Q AM   tamsulosin 0.4 mg Oral Daily          ROS:  Respiratory ROS: positive for - shortness of breath    Objective  Temp:  [97.6 °F (36.4 °C)-97.9 °F (36.6 °C)] 97.6 °F (36.4 °C)  Heart Rate:  [66-97] 82  Resp:  [18-24] 24  BP: (123-139)/(59-82) 139/82  I/O last 3 completed shifts:  In: 560 [P.O.:210; IV Piggyback:350]  Out: 200 [Urine:200]  I/O this shift:  In: 560 [P.O.:560]  Out: -     Physical Exam   Constitutional: She is oriented to person, place, and time. She appears well-developed and well-nourished. No distress. Nasal cannula in place.   HENT:   Head: Normocephalic and atraumatic.   Eyes: Pupils are equal, round, and reactive to light. No scleral icterus.   Cardiovascular: Normal rate and regular rhythm.    Pulmonary/Chest: Effort normal. No respiratory distress. She has decreased breath sounds. She has no wheezes.   Abdominal: Soft. Bowel sounds are normal. There is no  hepatosplenomegaly.   Neurological: She is alert and oriented to person, place, and time.   Skin: No cyanosis. Nails show no clubbing.   Psychiatric: She has a normal mood and affect. Her behavior is normal.         Results from last 7 days  Lab Units 10/18/17  0618 10/16/17  0804 10/15/17  0354   WBC 10*3/mm3 8.01 8.27 7.60   HEMOGLOBIN g/dL 10.2* 9.5* 9.1*   HEMATOCRIT % 36.1 33.9* 31.8*   PLATELETS 10*3/mm3 181 186 165       Results from last 7 days  Lab Units 10/18/17  0618 10/17/17  2050 10/16/17  0804   SODIUM mmol/L 148* 144 145   POTASSIUM mmol/L 4.0 4.3 4.3   CHLORIDE mmol/L 100 97* 103   CO2 mmol/L 39.2* 37.9* 33.5*   BUN mg/dL 31* 30* 28*   CREATININE mg/dL 0.95 1.06* 0.96   GLUCOSE mg/dL 92 150* 131*   CALCIUM mg/dL 9.2 9.0 8.4*       Results from last 7 days  Lab Units 10/15/17  0354   INR  1.19*           ASSESSMENT/ PLAN:  · Acute on chronic respiratory failure.  Her oxygen requirement has increased continue oxygen.  · Right lower lobe pneumonia on antibiotics  · COPD with exacerbation secondary to pneumonia, continue bronchodilators along with low-dose prednisone  · Diastolic heart failure   · Hypernatremia, labs in AM.   ·        Alvaro Dominguez MD, [facility]  Pulmonary, Critical Care and Sleep Medicine.  [facility]    10/18/2017 ,    EMR Dragon/Transcription disclaimer:   Much of this encounter note is an electronic transcription/translation of spoken language to printed text. The electronic translation of spoken language may permit erroneous, or at times, nonsensical words or phrases to be inadvertently transcribed; Although I have reviewed the note for such errors, some may still exist.

## 2017-10-18 NOTE — PLAN OF CARE
Problem: Patient Care Overview (Adult)  Goal: Plan of Care Review  Outcome: Ongoing (interventions implemented as appropriate)    10/18/17 9253   Coping/Psychosocial Response Interventions   Plan Of Care Reviewed With patient   Patient Care Overview   Progress improving   Outcome Evaluation   Outcome Summary/Follow up Plan Pt desats with any exertion, including laying her head down to change brief. Maintaining on 4L O2. Received lasix IV during shift, urinating frequently, but pt is still breathing abdominally. Denies pain at this time, all other VSS. Will continue to monitor.       Goal: Adult Individualization and Mutuality  Outcome: Ongoing (interventions implemented as appropriate)  Goal: Discharge Needs Assessment  Outcome: Ongoing (interventions implemented as appropriate)    Problem: Fall Risk (Adult)  Goal: Absence of Falls  Outcome: Outcome(s) achieved Date Met:  10/18/17

## 2017-10-18 NOTE — PROGRESS NOTES
LOS: 3 days   Patient Care Team:  Justin Jordan MD as PCP - General  Justin Jordan MD as PCP - Family Medicine  KYM Ventura as PCP - Claims Attributed    Chief Complaint   Patient presents with   • Shortness of Breath   • Fever   • Cough         Subjective   Breathing better today. Nose not feeling stopped up with using afrin. She also just coughed up some mucous & that has helped.    Objective     Vital Signs  Temp:  [97.6 °F (36.4 °C)-98.1 °F (36.7 °C)] 97.7 °F (36.5 °C)  Heart Rate:  [59-86] 81  Resp:  [16-24] 20  BP: (127-137)/(69-78) 137/69    Physical Exam:  Obese WF in NAD, currently on 4 liters O2  Skin warm & dry  Lungs BS absent in lower lung fields, a little better air movement in upper lung john  Heart RRR  Abd soft, NT, BS+  Ext  1+ edema     Results Review:     I reviewed the patient's new clinical results.    Medication Review:       LABS    Results from last 7 days  Lab Units 10/16/17  0804 10/15/17  0354 10/14/17  0555   SODIUM mmol/L 145 144 143   POTASSIUM mmol/L 4.3 4.2 4.4   CHLORIDE mmol/L 103 100 101   CO2 mmol/L 33.5* 33.3* 35.6*   BUN mg/dL 28* 31* 31*   CREATININE mg/dL 0.96 1.16* 1.40*   GLUCOSE mg/dL 131* 176* 125*   CALCIUM mg/dL 8.4* 7.9* 8.5*       Results from last 7 days  Lab Units 10/16/17  0804 10/15/17  0354 10/14/17  0555   WBC 10*3/mm3 8.27 7.60 6.55   HEMOGLOBIN g/dL 9.5* 9.1* 9.9*   HEMATOCRIT % 33.9* 31.8* 34.9*   PLATELETS 10*3/mm3 186 165 191       Results from last 7 days  Lab Units 10/15/17  0354   INR  1.19*           Assessment/Plan     Principal Problem:    Pneumonia of right lower lobe due to infectious organism  Active Problems:    [place] esophagus - on PPI    COPD, severe    Acute on chronic diastolic heart failure - will recheck BNP in am.    Hypertension    On home O2    Acute on chronic respiratory failure with hypoxia    Anemia - stable    Atrial flutter    Non-intractable vomiting with nausea    Acute kidney injury - creat previously  has been around 0.9 but I wonder if she needs to be at a higher level. It's still good today even with diuresing. She had gone up to 1.4 before & that may be where her baseline should be. Will cont to monitor          Bhargavi Vann MD  10/17/17  8:11 PM      Time:

## 2017-10-18 NOTE — PLAN OF CARE
Problem: Patient Care Overview (Adult)  Goal: Plan of Care Review    10/18/17 0926   Coping/Psychosocial Response Interventions   Plan Of Care Reviewed With patient   Patient Care Overview   Progress progress towards functional goals is fair   Outcome Evaluation   Outcome Summary/Follow up Plan Pt ambulating with assist of one; continues to demonstrate dyspnea on exertion. SOA limiting tolerance of increased gait distance; will continue to increase activity as able.

## 2017-10-18 NOTE — DISCHARGE INSTR - APPOINTMENTS
Appointment scheduled with KYM Jones(for Dr. Jordan) on October 25,2017 at 11:30am for hospital follow up

## 2017-10-18 NOTE — THERAPY TREATMENT NOTE
Acute Care - Physical Therapy Treatment Note  [facility]     Patient Name: Pooja Duque  : 1931  MRN: [id number]  Today's Date: 10/18/2017  Onset of Illness/Injury or Date of Surgery Date: 10/14/17     Referring Physician: Edwar    Admit Date: 10/14/2017    Visit Dx:    ICD-10-CM ICD-9-CM   1. Pneumonia of right lower lobe due to infectious organism J18.1 486   2. Atrial flutter, paroxysmal I48.92 427.32   3. Decreased mobility R26.89 781.99     Patient Active Problem List   Diagnosis   • Abnormal weight gain   • Acute upper respiratory infection   • [place] esophagus   • Benign colonic polyp   • Atherosclerosis of coronary artery   • COPD, severe   • Chest pressure   • Acute on chronic diastolic heart failure   • CN (constipation)   • Acute exacerbation of chronic obstructive airways disease   • Bleeding from the nose   • Bloodgood disease   • Hypertension   • Hypoxia   • Eczema intertrigo   • Leg cramp   • Chronic low back pain   • OP (osteoporosis)   • Left leg pain   • Basal cell papilloma   • Superficial thrombophlebitis of leg   • On home O2   • Intractable vomiting with nausea   • Acute on chronic respiratory failure with hypoxia   • Hospital discharge follow-up   • Bilateral leg edema   • Anemia   • Abdominal bloating   • Medicare annual wellness visit, initial   • Wheelchair bound   • Pneumonia of right lower lobe due to infectious organism   • Atrial flutter   • Non-intractable vomiting with nausea   • Acute kidney injury               Adult Rehabilitation Note       10/18/17 0900 10/17/17 1101       Rehab Assessment/Intervention    Discipline physical therapist  -EE physical therapist  -AR     Document Type therapy note (daily note)  -EE therapy note (daily note)  -AR     Subjective Information agree to therapy  -EE agree to therapy  -AR     Patient Effort, Rehab Treatment good  -EE good  -AR     Symptoms Noted During/After Treatment shortness of breath  -EE shortness of breath  -AR      Precautions/Limitations fall precautions;oxygen therapy device and L/min   4 L O2/min per nc  -EE fall precautions;oxygen therapy device and L/min  -AR     Recorded by [EE] Nicole Gautam, PT [AR] Belkys Mendoza PT     Vital Signs    Pre SpO2 (%) 87  -EE      O2 Delivery Pre Treatment supplemental O2  -EE supplemental O2  -AR     Intra SpO2 (%) 84  -EE      O2 Delivery Intra Treatment supplemental O2  -EE      Post SpO2 (%) 87  -EE      O2 Delivery Post Treatment supplemental O2  -EE      Pre Patient Position Supine  -EE      Intra Patient Position Standing  -EE      Post Patient Position Sitting  -EE      Recovery Time 2 min  -EE      Recorded by [EE] Nicole Gautam, PT [AR] Belkys Mendoza PT     Pain Assessment    Pain Assessment No/denies pain  -EE No/denies pain  -AR     Recorded by [EE] Nicole Gautam PT [AR] Belkys Mendoza PT     Cognitive Assessment/Intervention    Current Cognitive/Communication Assessment functional  -EE functional  -AR     Orientation Status oriented x 4  -EE oriented x 4  -AR     Follows Commands/Answers Questions 100% of the time  -EE      Personal Safety mild impairment  -EE      Personal Safety Interventions fall prevention program maintained;gait belt;muscle strengthening facilitated;nonskid shoes/slippers when out of bed;supervised activity  -EE      Recorded by [EE] Nicole Gautam, NAOMI [AR] Belkys Mendoza PT     Bed Mobility, Assessment/Treatment    Bed Mobility, Assistive Device head of bed elevated  -EE      Bed Mob, Supine to Sit, [place] supervision required  -EE not tested  -AR     Bed Mob, Sit to Supine, [place]  not tested  -AR     Recorded by [EE] Nicole Gautam, PT [AR] Belkys Mendoza PT     Transfer Assessment/Treatment    Transfers, Sit-Stand [place] contact guard assist;verbal cues required  -EE contact guard assist;minimum assist (75% patient effort)   min A 2nd attempt  -AR     Transfers, Stand-Sit [place] contact guard assist;verbal cues required  -EE  contact guard assist  -AR     Transfers, Sit-Stand-Sit, Assist Device other (see comments)   rollator  -EE --   rollator  -AR     Toilet Transfer, [place] minimum assist (75% patient effort);verbal cues required  -EE      Toilet Transfer, Assistive Device other (see comments)   rollator; grab bar  -EE      Transfer, Safety Issues balance decreased during turns  -EE      Transfer, Impairments strength decreased;impaired balance  -EE      Transfer, Comment verbal cues required for hand placement  -EE      Recorded by [EE] Nicole Gautam, PT [AR] Belkys Mendoza PT     Gait Assessment/Treatment    Gait, [place] Level contact guard assist  -EE contact guard assist  -AR     Gait, Assistive Device rollator  -EE rollator  -AR     Gait, Distance (Feet) 25  -EE 25  -AR     Gait, Gait Deviations forward flexed posture;lynda decreased;decreased heel strike  -EE lynda decreased;decreased heel strike  -AR     Gait, Safety Issues step length decreased;weight-shifting ability decreased;supplemental O2  -EE step length decreased;weight-shifting ability decreased  -AR     Gait, Impairments strength decreased;impaired balance  -EE strength decreased;impaired balance  -AR     Gait, Comment 1 sitting rest break  -EE 1 sitting rest break  -AR     Recorded by [EE] Nicole Gautam, PT [AR] Belkys Mendoza PT     Positioning and Restraints    Pre-Treatment Position in bed  -EE in bed  -AR     Post Treatment Position chair  -EE chair  -AR     In Chair reclined;call light within reach;encouraged to call for assist;legs elevated;with nsg  -EE reclined;sitting;call light within reach;encouraged to call for assist;exit alarm on  -AR     Recorded by [EE] Nicole Gautam PT [AR] Belkys Mendoza PT       User Key  (r) = Recorded By, (t) = Taken By, (c) = Cosigned By    Initials Name Effective Dates    EE Nicole Gautam, PT 12/01/15 -     AR Belkys Mendoza PT 06/27/16 -                 IP PT Goals       10/16/17 0932          Bed Mobility PT  LTG    Bed Mobility PT LTG, Date Established 10/16/17  -EE      Bed Mobility PT LTG, Time to Achieve 1 wk  -EE      Bed Mobility PT LTG, Activity Type all bed mobility  -EE      Bed Mobility PT LTG, [place] Level independent  -EE      Transfer Training PT LTG    Transfer Training PT LTG, Date Established 10/16/17  -EE      Transfer Training PT LTG, Time to Achieve 1 wk  -EE      Transfer Training PT LTG, Activity Type all transfers  -EE      Transfer Training PT LTG, [place] Level supervision required  -EE      Transfer Training PT LTG, Assist Device walker, rolling   rollator  -EE      Gait Training PT LTG    Gait Training Goal PT LTG, Date Established 10/16/17  -EE      Gait Training Goal PT LTG, Time to Achieve 1 wk  -EE      Gait Training Goal PT LTG, [place] Level supervision required  -EE      Gait Training Goal PT LTG, Assist Device walker, rolling   rollator  -EE      Gait Training Goal PT LTG, Distance to Achieve 50  -EE      Gait Training Goal PT LTG, Additional Goal with O2 sats >= 88%   -EE      Patient Education PT LTG    Patient Education PT LTG, Date Established 10/16/17  -EE      Patient Education PT LTG, Time to Achieve 1 wk  -EE      Patient Education PT LTG, Education Type HEP  -EE      Patient Education PT LTG, Education Understanding demonstrate adequately  -EE        User Key  (r) = Recorded By, (t) = Taken By, (c) = Cosigned By    Initials Name Provider Type    VLADIMIR Gautam PT Physical Therapist          Physical Therapy Education     Title: PT OT SLP Therapies (Active)     Topic: Physical Therapy (Active)     Point: Mobility training (Active)    Learning Progress Summary    Learner Readiness Method Response Comment Documented by Status   Patient Acceptance E,TB NR  EE 10/18/17 0926 Active    Acceptance E NR  AR 10/17/17 1102 Active    Acceptance E,TB VU,NR  EE 10/16/17 0931 Done               Point: Home exercise program (Active)    Learning Progress Summary    Learner  Readiness Method Response Comment Documented by Status   Patient Acceptance E NR  AR 10/17/17 1102 Active               Point: Body mechanics (Active)    Learning Progress Summary    Learner Readiness Method Response Comment Documented by Status   Patient Acceptance E,TB NR  EE 10/18/17 0926 Active    Acceptance E NR  AR 10/17/17 1102 Active               Point: Precautions (Active)    Learning Progress Summary    Learner Readiness Method Response Comment Documented by Status   Patient Acceptance E NR  AR 10/17/17 1102 Active                      User Key     Initials Effective Dates Name Provider Type Discipline    EE 12/01/15 -  Nicole Gautam, PT Physical Therapist PT    AR 06/27/16 -  Belkys Mendoza, PT Physical Therapist PT                    PT Recommendation and Plan  Anticipated Discharge Disposition: skilled nursing facility  Planned Therapy Interventions: balance training, bed mobility training, gait training, home exercise program, patient/family education, strengthening, transfer training  PT Frequency: daily  Plan of Care Review  Plan Of Care Reviewed With: patient  Progress: progress towards functional goals is fair  Outcome Summary/Follow up Plan: Pt ambulating with assist of one; continues to demonstrate dyspnea on exertion. SOA limiting tolerance of increased gait distance; will continue to increase activity as able.           Outcome Measures       10/18/17 0900 10/17/17 1100 10/16/17 0900    How much help from another person do you currently need...    Turning from your back to your side while in flat bed without using bedrails? 4  -EE 4  -AR 4  -EE    Moving from lying on back to sitting on the side of a flat bed without bedrails? 3  -EE 3  -AR 3  -EE    Moving to and from a bed to a chair (including a wheelchair)? 3  -EE 3  -AR 3  -EE    Standing up from a chair using your arms (e.g., wheelchair, bedside chair)? 3  -EE 3  -AR 3  -EE    Climbing 3-5 steps with a railing? 2  -EE 2  -AR 2  -EE    To  walk in hospital room? 3  -EE 3  -AR 3  -EE    AM-PAC 6 Clicks Score 18  -EE 18  -AR 18  -EE    Functional Assessment    Outcome Measure Options AM-PAC 6 Clicks Basic Mobility (PT)  -EE  AM-PAC 6 Clicks Basic Mobility (PT)  -EE      User Key  (r) = Recorded By, (t) = Taken By, (c) = Cosigned By    Initials Name Provider Type    EE Nicole Gautam, PT Physical Therapist    HERNANDO Mendoza PT Physical Therapist           Time Calculation:         PT Charges       10/18/17 0927          Time Calculation    Start Time 0900  -EE      Stop Time 0918  -EE      Time Calculation (min) 18 min  -EE      PT Received On 10/18/17  -EE      PT - Next Appointment 10/19/17  -EE        User Key  (r) = Recorded By, (t) = Taken By, (c) = Cosigned By    Initials Name Provider Type    VLADIMIR Gautam, NAOMI Physical Therapist          Therapy Charges for Today     Code Description Service Date Service Provider Modifiers Qty    69858446777 HC PT THER PROC EA 15 MIN 10/18/2017 Nicole Gautam, PT GP 1    35648783036 HC PT THER SUPP EA 15 MIN 10/18/2017 Nicole Gautam, PT GP 1          PT G-Codes  Outcome Measure Options: AM-PAC 6 Clicks Basic Mobility (PT)    Nicole Gautam PT  10/18/2017

## 2017-10-18 NOTE — PROGRESS NOTES
"     LOS: 4 days   Primary Care Physician: Justin Jordan MD     Subjective   Feels better.  Thinks that she will be able to manage at her assisted living facility.  On 3 L of oxygen at home.    Vital Signs  Body mass index is 40.25 kg/(m^2).  Temp:  [97.6 °F (36.4 °C)-97.9 °F (36.6 °C)] 97.9 °F (36.6 °C)  Heart Rate:  [59-97] 97  Resp:  [16-24] 24  BP: (123-137)/(59-76) 132/76    On 4 L O2    Objective:  General Appearance:  In no acute distress (Mild dyspnea with conversation).    Vital signs: (most recent): Blood pressure 132/76, pulse 97, temperature 97.9 °F (36.6 °C), temperature source Oral, resp. rate 24, height 61\" (154.9 cm), weight 213 lb (96.6 kg), SpO2 92 %.    HEENT: (No JVD but increased shortness of breath when lying at 30°.  Neck supple.  No lymphadenopathy.  Trachea midline)    Lungs:  There are rales and decreased breath sounds.  (Crackles at right base)  Heart: Irregular rhythm.  No murmur.   Abdomen: Abdomen is soft and non-distended.  Bowel sounds are normal.   There is no abdominal tenderness.   There is no splenomegaly. There is no hepatomegaly.   Extremities: There is dependent edema.  (1+ edema)  Neurological: Patient is alert.          Results Review:    I reviewed the patient's new clinical results.      Results from last 7 days  Lab Units 10/18/17  0618 10/16/17  0804   WBC 10*3/mm3 8.01 8.27   HEMOGLOBIN g/dL 10.2* 9.5*   PLATELETS 10*3/mm3 181 186       Results from last 7 days  Lab Units 10/18/17  0618 10/17/17  2050   SODIUM mmol/L 148* 144   POTASSIUM mmol/L 4.0 4.3   CHLORIDE mmol/L 100 97*   CO2 mmol/L 39.2* 37.9*   BUN mg/dL 31* 30*   CREATININE mg/dL 0.95 1.06*   CALCIUM mg/dL 9.2 9.0   GLUCOSE mg/dL 92 150*       Results from last 7 days  Lab Units 10/15/17  0354   INR  1.19*     Hemoglobin A1C:  Lab Results   Component Value Date    HGBA1C 6.3 (H) 09/26/2015       Glucose Range:No results found for: POCGLU    Lab Results   Component Value Date    CTFGOQIZ81 354 09/26/2015 "       Lab Results   Component Value Date    TSH 0.719 10/15/2017       Assessment & Plan      Medication Review: Yes    Active Hospital Problems (** Indicates Principal Problem)    Diagnosis Date Noted   • **Pneumonia of right lower lobe due to infectious organism [J18.1] 10/14/2017   • Atrial flutter [I48.92] 10/14/2017   • Non-intractable vomiting with nausea [R11.2] 10/14/2017   • Acute kidney injury [N17.9] 10/14/2017   • Anemia [D64.9] 10/12/2016   • Acute on chronic respiratory failure with hypoxia [J96.21] 10/03/2016   • On home O2 [Z99.81] 02/01/2016   • COPD, severe [J44.9] 02/01/2016   • Acute on chronic diastolic heart failure [I50.33] 02/01/2016   • [place] esophagus [K22.70] 02/01/2016   • Hypertension [I10] 02/01/2016      Resolved Hospital Problems    Diagnosis Date Noted Date Resolved   No resolved problems to display.       Assessment/Plan  1.  Pneumonia with acute on chronic hypoxic respiratory failure.  Day 4 of antibiotics: Zithromax and Rocephin.  Wean oxygen as tolerated  2.  Ectopy, probable PACs related to current illness.  Potassium is okay.  Heart rate is okay.  3.  Severe COPD.  Continue O2 and mini nebs and inhaler.  Also on oral steroids.  4.  Chronic diastolic congestive heart failure.  She has peripheral edema but her sodium level is increased.  Change IV Lasix back to her home dose of 40 mg daily.  She got 2 doses yesterday of IV Lasix.  Recheck labs in a.m.  Echocardiogram showed fairly normal ejection fraction and could not determine diastolic function.  5.  Anemia.  Hemoglobin was 10.7 one year ago.  Iron stores were low at that time.  Outpatient follow-up    Citlali Robles MD  10/18/17  9:28 AM

## 2017-10-19 NOTE — PROGRESS NOTES
Continued Stay Note  [facility]     Patient Name: Pooja Duque  MRN: [id number]  Today's Date: 10/19/2017    Admit Date: 10/14/2017          Discharge Plan       10/19/17 1116    Case Management/Social Work Plan    Plan [facility] with [facility]    Patient/Family In Agreement With Plan yes    Additional Comments Spoke with daughter-in-law, Zuleima at [phone] to inform of patient's discharge and she states staff at [facility] check on patient 2-3 times a day and family will be checking on patient also.  Patient to return  to [facility] with [facility](Ninoska notified of discharge at ext. [phone]).  DUSTIN Hodge informed Zuleima(D-I-L) plans to provide transportation with her portable oxygen for patient at 2pm today to assisted living facility.....Dee Dee Ramires RN,CCP              Discharge Codes     None        Expected Discharge Date and Time     Expected Discharge Date Expected Discharge Time    Oct 19, 2017             Dee Dee Ramires RN

## 2017-10-19 NOTE — PLAN OF CARE
Problem: Patient Care Overview (Adult)  Goal: Plan of Care Review  Outcome: Ongoing (interventions implemented as appropriate)    10/19/17 0329   Coping/Psychosocial Response Interventions   Plan Of Care Reviewed With patient   Patient Care Overview   Progress no change   Outcome Evaluation   Outcome Summary/Follow up Plan Pt still very SOA with position changes, on 4LNC. Receiving IV and PO abx. Aflutter persists, will continue to monitor.        Goal: Adult Individualization and Mutuality  Outcome: Ongoing (interventions implemented as appropriate)  Goal: Discharge Needs Assessment  Outcome: Ongoing (interventions implemented as appropriate)    Problem: Fall Risk (Adult)  Goal: Absence of Falls  Outcome: Ongoing (interventions implemented as appropriate)

## 2017-10-19 NOTE — DISCHARGE SUMMARY
Date of Admission: 10/14/2017  Date of Discharge:  10/19/2017  Primary Care Physician: Justin Jordan MD     Discharge Diagnosis:  Active Hospital Problems (** Indicates Principal Problem)    Diagnosis Date Noted   • **Pneumonia of right lower lobe due to infectious organism [J18.1] 10/14/2017   • Atrial flutter [I48.92] 10/14/2017   • Non-intractable vomiting with nausea [R11.2] 10/14/2017   • Acute kidney injury [N17.9] 10/14/2017   • Anemia [D64.9] 10/12/2016   • Acute on chronic respiratory failure with hypoxia [J96.21] 10/03/2016   • On home O2 [Z99.81] 02/01/2016   • COPD, severe [J44.9] 02/01/2016   • Acute on chronic diastolic heart failure [I50.33] 02/01/2016   • [place] esophagus [K22.70] 02/01/2016   • Hypertension [I10] 02/01/2016      Resolved Hospital Problems    Diagnosis Date Noted Date Resolved   No resolved problems to display.       Presenting Problem/History of Present Illness:  Atrial flutter, paroxysmal [I48.92]  Pneumonia of right lower lobe due to infectious organism [J18.1]     Hospital Course:  The patient is a 85 y.o. woman admitted to our service for pneumonia with COPD exacerbation.  She had acute and chronic respiratory failure.  Normally she is on 3 L of oxygen.  She had what looked to be atrial flutter at admission versus atrial tachycardia per cardiology..  Echocardiogram was done.  Broad-spectrum IV antibiotics were started. Her Toprol dosage was increased.  She was started on digoxin as well.  Cardiology recommended against anticoagulation or cardioversion.    She received several doses of IV Lasix which I discontinued yesterday.  Sodium level was increasing and serum bicarbonate level was increasing.  Sodium level is better today.    Patient has improved slowly.  Today is day 5 on the antibiotics.  Physical therapy has been working with her.  She is ready for discharge back to assisted living.  She does believe she will be able to manage there.  [facility] spoke with patient's  daughter-in-law.  The patient's family members and the assisted living staff will be checking on her more frequently.  Yesterday the patient's daughter-in-law requested low dose medication to help with the patient's anxiety.  I started her on Ativan 0.25 mg daily as needed with instructions to hold for sedation.  BuSpar dosage was increased.    Stable condition; fair prognosis    Exam Today:  Feels about the same.  Did walk with physical therapy yesterday.  At the assisted living, she uses the wheelchair and also walks a little bit.  Elderly and chronically ill-appearing.  She is on 4 L of oxygen.  Vital signs noted.  No distress.  Heart is regular.  No murmur.  Lungs with decreased breath sounds, light crackles more on the right than the left.  Extremities trace edema.  Abdomen soft and nontender.  Obese.    Procedures Performed:  Echocardiogram 10/14/17     Labs  Glucose 103 BUN 33 creatinine 0.9 sodium 147 potassium 4.2 chloride 99 bicarbonate 41  White count 8.5 hemoglobin 10.1 platelets 209,000  ProBNP 7757 on 10/18/17  TSH 0.7  Strep and legionella antigens negative  Troponins negative  Respiratory PCR panel negative  Blood cultures negative    Consults:   Dr. Angelica Paul     Discharge Disposition:  Home or Self Care    Discharge Medications:   Pooja Duque   Home Medication Instructions KYLEIGH:[id number]    Printed on:10/19/17 1043   Medication Information                      acetaminophen (TYLENOL) 325 MG tablet  Take 2 tablets by mouth Every 6 (Six) Hours As Needed for Mild Pain .             albuterol (PROVENTIL HFA) 108 (90 BASE) MCG/ACT inhaler  Inhale 2 puffs Every 4 (Four) Hours As Needed for wheezing.             aspirin  MG tablet  TAKE 1 TABLET BY MOUTH DAILY             BROVANA 15 MCG/2ML nebulizer solution  INHALE ONE VIAL BY MOUTH TWICE A DAY             budesonide (PULMICORT) 0.5 MG/2ML nebulizer solution  USE 1 VIAL PER NEBULIZER TWO TIMES A DAY (MAY MIX WITH  BROVANA IN THE NEBULIZER)             busPIRone (BUSPAR) 5 MG tablet  Take 1.5 tablets by mouth 2 (Two) Times a Day.             calcium carbonate-vitamin d 600-400 MG-UNIT per tablet  TAKE 1 TABLET BY MOUTH DAILY             cefpodoxime (VANTIN) 200 MG tablet  Take 1 tablet by mouth Every 12 (Twelve) Hours.             digoxin (LANOXIN) 125 MCG tablet  Take 1 tablet by mouth Daily.             furosemide (LASIX) 40 MG tablet  TAKE 1 TABLET BY MOUTH DAILY             Loratadine (CLARITIN) 10 MG capsule  Take 1 tablet/day by mouth Daily.             LORazepam (ATIVAN) 0.5 MG tablet  Take 0.5 tablets by mouth Daily As Needed for Anxiety for up to 8 doses.             metoprolol succinate XL (TOPROL-XL) 100 MG 24 hr tablet  Take 2 tablets by mouth Daily.             Multiple Vitamins-Minerals (CENTRUM SILVER) tablet  Take by mouth.             nystatin (MYCOSTATIN) 605934 UNIT/GM powder  Apply  topically Every 12 (Twelve) Hours.             pantoprazole (PROTONIX) 40 MG EC tablet  @@ TAKE 1 TABLET BY MOUTH DAILY             sennosides-docusate sodium (SENOKOT-S) 8.6-50 MG tablet  Take 2 tablets by mouth At Night As Needed for Constipation.             sodium chloride (OCEAN) 0.65 % nasal spray  1 spray into each nostril As Needed for Congestion.             Tamsulosin HCl (FLOMAX PO)  Take 0.4 capsules by mouth Daily.                 Discharge Diet:   Diet Instructions     Diet: Regular; Thin       Discharge Diet:  Regular   Fluid Consistency:  Thin                 Activity at Discharge:   Activity Instructions     Activity as Tolerated                     Follow-up Appointments:  Dr. Jordan in 1 month.  Nurse practitioner as scheduled below    Future Appointments  Date Time Provider Department Center   10/25/2017 11:30 AM KYM Jones [facility] None   1/18/2018 2:30 PM Justin Jordan MD [facility] None     Additional Instructions for the Follow-ups that You Need to Schedule     Referral to Home  Health    As directed    Face to Face Visit Date:  10/19/2017   Follow-up Provider for Plan of Care?:  I treated the patient in an acute care facility and will not continue treatment after discharge.   Follow-up Provider:  JESSICA TAMEZ   Reason/Clinical Findings:  COPD, pneumonia   Describe mobility limitations that make leaving home difficult:  severe copd with pneumonia; acute/chronic resp failure   Nursing/Therapeutic Services Requested:   Physical Therapy  Skilled Nursing      Skilled nursing orders:   COPD management  Cardiopulmonary assessments      PT orders:   Strengthening  Home safety assessment      Frequency:  1 Week 1                 Test Results Pending at Discharge:  None       Citlali Robles MD  10/19/17  10:43 AM    Time Spent on Discharge Activities: 40 minutes.  Discussed with patient and nurse.  Discussed with CCP

## 2017-10-19 NOTE — PLAN OF CARE
Problem: Patient Care Overview (Adult)  Goal: Plan of Care Review    10/19/17 1046   Coping/Psychosocial Response Interventions   Plan Of Care Reviewed With patient   Patient Care Overview   Progress improving   Outcome Evaluation   Outcome Summary/Follow up Plan Pt demonstrating improved endurance as evidenced by tolerance of increased gait distance. Continues to be limited primarily by SOA with activity; no new concerns.

## 2017-10-19 NOTE — PROGRESS NOTES
Case Management Discharge Note    Final Note: Discharged  10/19/17 to home with [facility]    Discharge Placement     Facility/Agency Request Status Selected? Address Phone Number Fax Number    [facility] Accepted    Yes [address] [facility] [address], [facility] [address] [phone]        Other: Other (Family provided transportation at discharge)    Discharge Codes: 06  Discharged/transferred to home under care of organized home health service organization in anticipation of skilled care

## 2017-10-19 NOTE — THERAPY TREATMENT NOTE
Acute Care - Physical Therapy Treatment Note  [facility]     Patient Name: Pooja Duque  : 1931  MRN: [id number]  Today's Date: 10/19/2017  Onset of Illness/Injury or Date of Surgery Date: 10/14/17     Referring Physician: Edwar    Admit Date: 10/14/2017    Visit Dx:    ICD-10-CM ICD-9-CM   1. Pneumonia of right lower lobe due to infectious organism J18.1 486   2. Atrial flutter, paroxysmal I48.92 427.32   3. Decreased mobility R26.89 781.99   4. COPD, severe J44.9 496   5. Acute on chronic diastolic heart failure I50.33 428.33   6. Acute on chronic respiratory failure with hypoxia J96.21 518.84     799.02     Patient Active Problem List   Diagnosis   • Abnormal weight gain   • Acute upper respiratory infection   • [place] esophagus   • Benign colonic polyp   • Atherosclerosis of coronary artery   • COPD, severe   • Chest pressure   • Acute on chronic diastolic heart failure   • CN (constipation)   • Acute exacerbation of chronic obstructive airways disease   • Bleeding from the nose   • Bloodgood disease   • Hypertension   • Hypoxia   • Eczema intertrigo   • Leg cramp   • Chronic low back pain   • OP (osteoporosis)   • Left leg pain   • Basal cell papilloma   • Superficial thrombophlebitis of leg   • On home O2   • Intractable vomiting with nausea   • Acute on chronic respiratory failure with hypoxia   • Hospital discharge follow-up   • Bilateral leg edema   • Anemia   • Abdominal bloating   • Medicare annual wellness visit, initial   • Wheelchair bound   • Pneumonia of right lower lobe due to infectious organism   • Atrial flutter   • Non-intractable vomiting with nausea   • Acute kidney injury               Adult Rehabilitation Note       10/19/17 1020 10/18/17 0900 10/17/17 1101    Rehab Assessment/Intervention    Discipline physical therapist  -EE physical therapist  -EE physical therapist  -AR    Document Type therapy note (daily note)  -EE therapy note (daily note)  -EE therapy note (daily  note)  -AR    Subjective Information agree to therapy;complains of;fatigue  -EE agree to therapy  -EE agree to therapy  -AR    Patient Effort, Rehab Treatment good  -EE good  -EE good  -AR    Symptoms Noted During/After Treatment shortness of breath  -EE shortness of breath  -EE shortness of breath  -AR    Precautions/Limitations fall precautions;oxygen therapy device and L/min   4 L  -EE fall precautions;oxygen therapy device and L/min   4 L O2/min per nc  -EE fall precautions;oxygen therapy device and L/min  -AR    Recorded by [EE] Nicole Gautam PT [EE] Nicole Gautam PT [AR] Belkys Mendoza PT    Vital Signs    Pre SpO2 (%) 92  -EE 87  -EE     O2 Delivery Pre Treatment supplemental O2  -EE supplemental O2  -EE supplemental O2  -AR    Intra SpO2 (%) 84  -EE 84  -EE     O2 Delivery Intra Treatment supplemental O2  -EE supplemental O2  -EE     Post SpO2 (%) 91  -EE 87  -EE     O2 Delivery Post Treatment supplemental O2  -EE supplemental O2  -EE     Pre Patient Position Sitting  -EE Supine  -EE     Intra Patient Position Standing  -EE Standing  -EE     Post Patient Position Sitting  -EE Sitting  -EE     Recovery Time 1 min  -EE 2 min  -EE     Rest Breaks  1   standing rest break  -EE      Recorded by [EE] Nicole Gautam PT [EE] Nicole Gautam PT [AR] Belkys Mendoza PT    Pain Assessment    Pain Assessment No/denies pain  -EE No/denies pain  -EE No/denies pain  -AR    Recorded by [EE] Nicole Gautam PT [EE] Nicole Gautam PT [AR] Belkys Mendoza PT    Cognitive Assessment/Intervention    Current Cognitive/Communication Assessment functional  -EE functional  -EE functional  -AR    Orientation Status oriented x 4  -EE oriented x 4  -EE oriented x 4  -AR    Follows Commands/Answers Questions 100% of the time  -% of the time  -EE     Personal Safety WNL/WFL  -EE mild impairment  -EE     Personal Safety Interventions fall prevention program maintained;gait belt;muscle strengthening facilitated;nonskid shoes/slippers when  out of bed;supervised activity  -EE fall prevention program maintained;gait belt;muscle strengthening facilitated;nonskid shoes/slippers when out of bed;supervised activity  -EE     Recorded by [EE] Nicole Gauatm PT [EE] Nicole Gautam PT [AR] Belkys Mendoza PT    Bed Mobility, Assessment/Treatment    Bed Mobility, Assistive Device  head of bed elevated  -EE     Bed Mob, Supine to Sit, [place] not tested  -EE supervision required  -EE not tested  -AR    Bed Mob, Sit to Supine, [place] not tested  -EE  not tested  -AR    Bed Mobility, Comment up in chair  -EE      Recorded by [EE] Nicole Gautam PT [EE] Nicole Gautam PT [AR] Belkys Mendoza PT    Transfer Assessment/Treatment    Transfers, Sit-Stand [place] contact guard assist  -EE contact guard assist;verbal cues required  -EE contact guard assist;minimum assist (75% patient effort)   min A 2nd attempt  -AR    Transfers, Stand-Sit [place] contact guard assist  -EE contact guard assist;verbal cues required  -EE contact guard assist  -AR    Transfers, Sit-Stand-Sit, Assist Device other (see comments)   rollator  -EE other (see comments)   rollator  -EE --   rollator  -AR    Toilet Transfer, [place] contact guard assist;minimum assist (75% patient effort);verbal cues required  -EE minimum assist (75% patient effort);verbal cues required  -EE     Toilet Transfer, Assistive Device bedside commode without drop arms;other (see comments)   rollator  -EE other (see comments)   rollator; grab bar  -EE     Transfer, Safety Issues balance decreased during turns;weight-shifting ability decreased  -EE balance decreased during turns  -EE     Transfer, Impairments strength decreased;impaired balance  -EE strength decreased;impaired balance  -EE     Transfer, Comment  verbal cues required for hand placement  -EE     Recorded by [EE] Nicole Gautam PT [EE] Nicole Gautam PT [AR] Belkys Mendoza PT    Gait Assessment/Treatment    Gait, [place] Level contact  guard assist;verbal cues required  -EE contact guard assist  -EE contact guard assist  -AR    Gait, Assistive Device rollator  -EE rollator  -EE rollator  -AR    Gait, Distance (Feet) 40  -EE 25  -EE 25  -AR    Gait, Gait Deviations forward flexed posture;lynda decreased;decreased heel strike;weight-shifting ability decreased  -EE forward flexed posture;lynda decreased;decreased heel strike  -EE lynda decreased;decreased heel strike  -AR    Gait, Safety Issues weight-shifting ability decreased;supplemental O2;step length decreased  -EE step length decreased;weight-shifting ability decreased;supplemental O2  -EE step length decreased;weight-shifting ability decreased  -AR    Gait, Impairments strength decreased;impaired balance  -EE strength decreased;impaired balance  -EE strength decreased;impaired balance  -AR    Gait, Comment verbal cues required for posture; 1 standing rest break due to SOA  -EE 1 sitting rest break  -EE 1 sitting rest break  -AR    Recorded by [EE] Nicole Gautam, PT [EE] Nicole Gautam, PT [AR] Belkys Mendoza PT    Positioning and Restraints    Pre-Treatment Position sitting in chair/recliner  -EE in bed  -EE in bed  -AR    Post Treatment Position chair  -EE chair  -EE chair  -AR    In Chair sitting;call light within reach;encouraged to call for assist;exit alarm on  -EE reclined;call light within reach;encouraged to call for assist;legs elevated;with nsg  -EE reclined;sitting;call light within reach;encouraged to call for assist;exit alarm on  -AR    Recorded by [EE] Nicole Gautam PT [EE] Nicole Gautam PT [AR] Belkys Mendoza PT      User Key  (r) = Recorded By, (t) = Taken By, (c) = Cosigned By    Initials Name Effective Dates    EE Nicole Gautam PT 12/01/15 -     AR Belkys Mendoza PT 06/27/16 -                 IP PT Goals       10/16/17 0932          Bed Mobility PT LTG    Bed Mobility PT LTG, Date Established 10/16/17  -EE      Bed Mobility PT LTG, Time to Achieve 1 wk  -EE      Bed  Mobility PT LTG, Activity Type all bed mobility  -EE      Bed Mobility PT LTG, [place] Level independent  -EE      Transfer Training PT LTG    Transfer Training PT LTG, Date Established 10/16/17  -EE      Transfer Training PT LTG, Time to Achieve 1 wk  -EE      Transfer Training PT LTG, Activity Type all transfers  -EE      Transfer Training PT LTG, [place] Level supervision required  -EE      Transfer Training PT LTG, Assist Device walker, rolling   rollator  -EE      Gait Training PT LTG    Gait Training Goal PT LTG, Date Established 10/16/17  -EE      Gait Training Goal PT LTG, Time to Achieve 1 wk  -EE      Gait Training Goal PT LTG, [place] Level supervision required  -EE      Gait Training Goal PT LTG, Assist Device walker, rolling   rollator  -EE      Gait Training Goal PT LTG, Distance to Achieve 50  -EE      Gait Training Goal PT LTG, Additional Goal with O2 sats >= 88%   -EE      Patient Education PT LTG    Patient Education PT LTG, Date Established 10/16/17  -EE      Patient Education PT LTG, Time to Achieve 1 wk  -EE      Patient Education PT LTG, Education Type HEP  -EE      Patient Education PT LTG, Education Understanding demonstrate adequately  -EE        User Key  (r) = Recorded By, (t) = Taken By, (c) = Cosigned By    Initials Name Provider Type    VLADIMIR Gautam, PT Physical Therapist          Physical Therapy Education     Title: PT OT SLP Therapies (Done)     Topic: Physical Therapy (Done)     Point: Mobility training (Done)    Learning Progress Summary    Learner Readiness Method Response Comment Documented by Status   Patient Acceptance E,TB VU,NR  EE 10/19/17 1046 Done    Eager E VU  PJ 10/18/17 1455 Done    Acceptance E,TB NR  EE 10/18/17 0926 Active    Acceptance E NR  AR 10/17/17 1102 Active    Acceptance E,TB VU,NR  EE 10/16/17 0931 Done               Point: Home exercise program (Done)    Learning Progress Summary    Learner Readiness Method Response Comment Documented by  Status   Patient Eager E VU  PJ 10/18/17 1455 Done    Acceptance E NR  AR 10/17/17 1102 Active               Point: Body mechanics (Done)    Learning Progress Summary    Learner Readiness Method Response Comment Documented by Status   Patient Acceptance E,TB VU,NR  EE 10/19/17 1046 Done    Eager E VU  PJ 10/18/17 1455 Done    Acceptance E,TB NR  EE 10/18/17 0926 Active    Acceptance E NR  AR 10/17/17 1102 Active               Point: Precautions (Done)    Learning Progress Summary    Learner Readiness Method Response Comment Documented by Status   Patient Eager E VU  PJ 10/18/17 1455 Done    Acceptance E NR  AR 10/17/17 1102 Active                      User Key     Initials Effective Dates Name Provider Type Discipline    EE 12/01/15 -  Nicole Gautam, PT Physical Therapist PT    AR 06/27/16 -  Belkys Mendoza, PT Physical Therapist PT     06/16/16 -  Drea Reese RN Registered Nurse Nurse                    PT Recommendation and Plan  Anticipated Discharge Disposition: skilled nursing facility  Planned Therapy Interventions: balance training, bed mobility training, gait training, home exercise program, patient/family education, strengthening, transfer training  PT Frequency: daily  Plan of Care Review  Plan Of Care Reviewed With: patient  Progress: improving  Outcome Summary/Follow up Plan: Pt demonstrating improved endurance as evidenced by tolerance of increased gait distance. Continues to be limited primarily by SOA with activity; no new concerns.           Outcome Measures       10/19/17 1000 10/18/17 0900 10/17/17 1100    How much help from another person do you currently need...    Turning from your back to your side while in flat bed without using bedrails? 4  -EE 4  -EE 4  -AR    Moving from lying on back to sitting on the side of a flat bed without bedrails? 3  -EE 3  -EE 3  -AR    Moving to and from a bed to a chair (including a wheelchair)? 3  -EE 3  -EE 3  -AR    Standing up from a chair using your arms  (e.g., wheelchair, bedside chair)? 3  -EE 3  -EE 3  -AR    Climbing 3-5 steps with a railing? 2  -EE 2  -EE 2  -AR    To walk in hospital room? 3  -EE 3  -EE 3  -AR    AM-PAC 6 Clicks Score 18  -EE 18  -EE 18  -AR    Functional Assessment    Outcome Measure Options AM-PAC 6 Clicks Basic Mobility (PT)  -EE AM-PAC 6 Clicks Basic Mobility (PT)  -EE       User Key  (r) = Recorded By, (t) = Taken By, (c) = Cosigned By    Initials Name Provider Type    EE Nicole Gautam, PT Physical Therapist    AR Belkys Mendoza PT Physical Therapist           Time Calculation:         PT Charges       10/19/17 1047          Time Calculation    Start Time 1000  -EE      Stop Time 1020  -EE      Time Calculation (min) 20 min  -EE      PT Received On 10/19/17  -EE      PT - Next Appointment 10/20/17  -EE        User Key  (r) = Recorded By, (t) = Taken By, (c) = Cosigned By    Initials Name Provider Type    EE Nicole Gautam PT Physical Therapist          Therapy Charges for Today     Code Description Service Date Service Provider Modifiers Qty    43372610710 HC PT THER PROC EA 15 MIN 10/18/2017 Nicole Gautam, PT GP 1    74719644281 HC PT THER SUPP EA 15 MIN 10/18/2017 Nicole Gautam, PT GP 1    42753753648 HC PT THER PROC EA 15 MIN 10/19/2017 Nicole Gautam PT GP 1          PT G-Codes  Outcome Measure Options: AM-PAC 6 Clicks Basic Mobility (PT)    Nicole Gautam PT  10/19/2017

## 2017-10-22 PROBLEM — I50.21 ACUTE SYSTOLIC CONGESTIVE HEART FAILURE (HCC): Status: ACTIVE | Noted: 2017-01-01

## 2017-10-23 PROBLEM — J96.02 ACUTE RESPIRATORY FAILURE WITH HYPOXIA AND HYPERCAPNIA (HCC): Status: ACTIVE | Noted: 2017-01-01

## 2017-10-23 PROBLEM — J96.01 ACUTE RESPIRATORY FAILURE WITH HYPOXIA AND HYPERCAPNIA (HCC): Status: ACTIVE | Noted: 2017-01-01

## 2017-10-24 PROBLEM — Z66 DNR (DO NOT RESUSCITATE): Status: ACTIVE | Noted: 2017-01-01

## 2017-10-26 PROBLEM — E87.0 HYPERNATREMIA: Status: ACTIVE | Noted: 2017-01-01

## 2017-11-01 NOTE — PROGRESS NOTES
[facility]     LOS: 10 days     Subjective:  soa about the same  Looks a llittle better each day  No cp  eating well    Objective:    Vital Signs:  Temp:  [97.1 °F (36.2 °C)-98 °F (36.7 °C)] 98 °F (36.7 °C)  Heart Rate:  [] 95  Resp:  [18-24] 20  BP: (120-137)/(67-68) 120/67    Last 2 weights    10/31/17  0603 11/01/17  0500   Weight: 208 lb (94.3 kg) 200 lb (90.7 kg)       General Appearance: no acute distress, appears comfortable    Heart: regular rate and rhythm  Lungs: clear to auscultation bilaterally, less labored respirations, ok air entry  Abdomen: soft, non-tender, no guarding, no rebound, non-distended  Extremities: no edema, no cyanosis  Neurology: speech normal, CN grossly normal  Psychiatric: normal mood and affect    Results Review:    Glucose   Date Value Ref Range Status   10/31/2017 250 (H) 65 - 99 mg/dL Final   10/31/2017 162 (H) 65 - 99 mg/dL Final   10/30/2017 226 (H) 65 - 99 mg/dL Final       Results from last 7 days  Lab Units 10/29/17  0737   WBC 10*3/mm3 8.20   HEMOGLOBIN g/dL 10.6*   HEMATOCRIT % 37.6   PLATELETS 10*3/mm3 166       Results from last 7 days  Lab Units 11/01/17  1059  10/31/17  1148   SODIUM mmol/L 149*  < > 149*   POTASSIUM mmol/L  --   --  3.8   CHLORIDE mmol/L  --   --  93*   CO2 mmol/L  --   --  44.5*   BUN mg/dL  --   --  39*   CREATININE mg/dL  --   --  0.88   CALCIUM mg/dL  --   --  8.8   GLUCOSE mg/dL  --   --  250*   < > = values in this interval not displayed.                       I have reviewed daily medications and changes in CPOE    Scheduled meds    arformoterol 15 mcg Nebulization BID - RT   aspirin EC 81 mg Oral Daily   budesonide 0.5 mg Nebulization BID - RT   busPIRone 7.5 mg Oral BID   calcium-vitamin D 1,000 mg Oral Q12H   digoxin 125 mcg Oral Daily   doxycycline 100 mg Oral Q12H   fluticasone 2 spray Each Nare Daily   heparin (porcine) 5,000 Units Subcutaneous Q12H   ipratropium-albuterol 3 mL Nebulization Q4H - RT    metoprolol succinate  mg Oral Daily   nystatin  Topical Q12H   pantoprazole 40 mg Oral Q AM   predniSONE 40 mg Oral Daily With Breakfast          PRN meds  •  acetaminophen  •  albuterol  •  LORazepam  •  potassium chloride **OR** potassium chloride **OR** potassium chloride  •  sennosides-docusate sodium  •  sodium chloride  •  sodium chloride  •  Insert peripheral IV **AND** sodium chloride      Principal Problem:    Acute respiratory failure with hypoxia and hypercapnia  Active Problems:    Acute on chronic diastolic heart failure    Acute exacerbation of chronic obstructive airways disease    Hypertension    Chronic low back pain    On home O2    DNR (do not resuscitate)    Hypernatremia        Assessment/Plan:        Acute respiratory failure with hypoxia and hypercapnia- solumedrol iv change to prednisone 40, pulmonary is following. Weaned to 4 liters today and sats are 89-93% while I am in the room with the patient      Acute on chronic diastolic heart failure- bnp elevated, sodium is high, lasix 40 IV being held, cardiology is following      Hypertension      Chronic low back pain    Copd-jesus pulmicort      DNR (do not resuscitate)      Hypernatremia-slightly better today, I think this is a marker that the patient currently intravascularly depleted    Elevated- bs a1c 5.9, monitor    Swallow eval noted    Plan:  D/w Dr Paul and likely d/c in the 1-2 days as the patient is probably close to her baseline.    >35 minutes spent, > 1/2 time spent counseling and coordination of care       Felix Reagan MD  11/01/17  5:22 PM

## 2017-11-01 NOTE — PLAN OF CARE
Problem: Patient Care Overview (Adult)  Goal: Plan of Care Review  Outcome: Ongoing (interventions implemented as appropriate)    11/01/17 1035   Coping/Psychosocial Response Interventions   Plan Of Care Reviewed With patient   Outcome Evaluation   Outcome Summary/Follow up Plan Pt increased ambulation distance slightly, but required 2 seated rest breaks due to fatigue and dyspnea. A little more unsteady today w/ ambulation.

## 2017-11-01 NOTE — PROGRESS NOTES
Daily Progress Note.     11/1/2017    Pooja Duque ,  85 y.o. ,female  [facility] [address]      Brief problem (summary)  · Acute on chronic respiratory failure  · COPD exacerbation  · CHF  · Pleural effusion, on the right side  · Immobility syndrome        Today, I keep cutting down on oxygen and some one increase on oxygen. When ever I see her the saturations is 96-98%    PMS/FH/SH: reviewed and unchanged.  Medications:     arformoterol 15 mcg Nebulization BID - RT   aspirin EC 81 mg Oral Daily   budesonide 0.5 mg Nebulization BID - RT   busPIRone 7.5 mg Oral BID   calcium-vitamin D 1,000 mg Oral Q12H   digoxin 125 mcg Oral Daily   doxycycline 100 mg Oral Q12H   fluticasone 2 spray Each Nare Daily   heparin (porcine) 5,000 Units Subcutaneous Q12H   ipratropium-albuterol 3 mL Nebulization Q4H - RT   metoprolol succinate  mg Oral Daily   nystatin  Topical Q12H   pantoprazole 40 mg Oral Q AM   predniSONE 40 mg Oral Daily With Breakfast          ROS:  Respiratory ROS: positive for - shortness of breath    Objective  Temp:  [97.1 °F (36.2 °C)-98 °F (36.7 °C)] 98 °F (36.7 °C)  Heart Rate:  [] 91  Resp:  [18-24] 18  BP: (121-137)/(67-71) 126/67  I/O last 3 completed shifts:  In: 600 [P.O.:600]  Out: -        Physical Exam   Constitutional: She is oriented to person, place, and time. She appears well-developed and well-nourished. No distress. Nasal cannula in place.   HENT:   Head: Normocephalic and atraumatic.   Eyes: Pupils are equal, round, and reactive to light. No scleral icterus.   Cardiovascular: Normal rate and regular rhythm.    Pulmonary/Chest: Effort normal. No respiratory distress. She has no decreased breath sounds. She has no wheezes.   Abdominal: Soft. Bowel sounds are normal. There is no hepatosplenomegaly.   Neurological: She is alert and oriented to person, place, and time.   Skin: No cyanosis. Nails show no clubbing.   Psychiatric: She has a normal mood and affect. Her  behavior is normal.        CT  CT shows significant emphysema  Small pleural effusion on the right side,  No evidence of pulmonary embolism    CXR: unchanged from previous X ray       Results from last 7 days  Lab Units 10/29/17  0737   WBC 10*3/mm3 8.20   HEMOGLOBIN g/dL 10.6*   HEMATOCRIT % 37.6   PLATELETS 10*3/mm3 166       Results from last 7 days  Lab Units 10/31/17  1424 10/31/17  1148 10/31/17  0600 10/30/17  0724   SODIUM mmol/L 147* 149* 154* 150*   POTASSIUM mmol/L  --  3.8 4.4  4.1 2.9*   CHLORIDE mmol/L  --  93* 99 93*   CO2 mmol/L  --  44.5* 47.5* 45.2*   BUN mg/dL  --  39* 39* 36*   CREATININE mg/dL  --  0.88 0.78 0.80   GLUCOSE mg/dL  --  250* 162* 226*   CALCIUM mg/dL  --  8.8 8.6 8.2*               ASSESSMENT/ PLAN:  · Acute on chronic respiratory failure.  I have turned down the oxygen to 4 L  .   · COPD exacerbation.  She recently was treated for pneumonia but she has significant emphysema on the CT.  I do agree with steroids but will start weaning   · Congestive heart failure, acute on chronic diastolic heart failure, add lasix, still her Pro-BNP is high  · Right pleural effusion, small  · Hypernatremia      Physical therapy to walk the patient     CODE STATUS noted  She is agreeable for rehab       Alvaro Dominguez MD, [facility]  Pulmonary, Critical Care and Sleep Medicine.  [facility]    11/1/2017 ,    EMR Dragon. (Not a legal disclaimer)   Much of this encounter note is an electronic transcription/translation of spoken language to printed text. The electronic translation of spoken language may permit erroneous, or at times, nonsensical words or phrases to be inadvertently transcribed; Although I have reviewed the note for such errors, some may still exist.

## 2017-11-01 NOTE — PLAN OF CARE
Problem: Patient Care Overview (Adult)  Goal: Plan of Care Review  Outcome: Ongoing (interventions implemented as appropriate)    11/01/17 3896   Coping/Psychosocial Response Interventions   Plan Of Care Reviewed With patient   Patient Care Overview   Progress improving   Outcome Evaluation   Outcome Summary/Follow up Plan Patient has been pleasant and cooperative during shift. Has ambulated twice today and worked with PT. No complainbts of pain or SOA. Dr. Dominguez placed Pt on 4L and is keeping her between 89-91%. Pt up to commode and has had multiple bowel movements. Pt will go to [facility] at D/C. Will continue to monitor and assist patient as needed.         Problem: Fall Risk (Adult)  Goal: Absence of Falls  Outcome: Ongoing (interventions implemented as appropriate)    Problem: Skin Integrity Impairment, Risk/Actual (Adult)  Goal: Skin Integrity/Wound Healing  Outcome: Ongoing (interventions implemented as appropriate)    Problem: Pain, Acute (Adult)  Goal: Acceptable Pain Control/Comfort Level  Outcome: Ongoing (interventions implemented as appropriate)    Problem: Respiratory Insufficiency (Adult)  Goal: Acid/Base Balance  Outcome: Ongoing (interventions implemented as appropriate)  Goal: Effective Ventilation  Outcome: Ongoing (interventions implemented as appropriate)

## 2017-11-01 NOTE — PLAN OF CARE
Problem: Patient Care Overview (Adult)  Goal: Plan of Care Review  Outcome: Ongoing (interventions implemented as appropriate)    11/01/17 0331   Coping/Psychosocial Response Interventions   Plan Of Care Reviewed With patient   Patient Care Overview   Progress no change   Outcome Evaluation   Outcome Summary/Follow up Plan no complications pt slept most of the night. will continue to monitor        Goal: Adult Individualization and Mutuality  Outcome: Ongoing (interventions implemented as appropriate)  Goal: Discharge Needs Assessment  Outcome: Ongoing (interventions implemented as appropriate)    Problem: Fall Risk (Adult)  Goal: Absence of Falls  Outcome: Ongoing (interventions implemented as appropriate)    Problem: Skin Integrity Impairment, Risk/Actual (Adult)  Goal: Skin Integrity/Wound Healing  Outcome: Ongoing (interventions implemented as appropriate)    Problem: Pain, Acute (Adult)  Goal: Acceptable Pain Control/Comfort Level  Outcome: Ongoing (interventions implemented as appropriate)    Problem: Respiratory Insufficiency (Adult)  Goal: Acid/Base Balance  Outcome: Ongoing (interventions implemented as appropriate)  Goal: Effective Ventilation  Outcome: Ongoing (interventions implemented as appropriate)

## 2017-11-01 NOTE — PROGRESS NOTES
"Patient Care Team:  Justin Jordan MD as PCP - General  Justin Jordan MD as PCP - Family Medicine  KYM Ventura as PCP - Claims Attributed  Dean Hermosillo RN as Care Coordinator ([facility])    Chief Complaint: Follow-up acute on chronic respiratory failure    Interval History:   No new complaints.  Maintaining oxygen saturations on 4 L nasal cannula.    Objective   Vital Signs  Temp:  [97.1 °F (36.2 °C)-98 °F (36.7 °C)] 98 °F (36.7 °C)  Heart Rate:  [] 98  Resp:  [18-24] 18  BP: (120-137)/(67-68) 120/67    Intake/Output Summary (Last 24 hours) at 11/01/17 1513  Last data filed at 10/31/17 1700   Gross per 24 hour   Intake              120 ml   Output                0 ml   Net              120 ml     Flowsheet Rows         First Filed Value    Admission Height  61\" (154.9 cm) Documented at 10/22/2017 1517    Admission Weight  210 lb (95.3 kg) Documented at 10/22/2017 1517          General Appearance:    Alert, cooperative, in no acute distress   Head:    Normocephalic, without obvious abnormality, atraumatic       Neck:   No adenopathy, supple, no thyromegaly, no carotid bruit, no    JVD   Lungs:     Clear to auscultation bilaterally, no wheezes, rales, or     rhonchi    Heart:    Normal rate, regular rhythm,  No murmur, rub, or gallop   Chest Wall:    No abnormalities observed   Abdomen:     Normal bowel sounds, soft, non-tender, non-distended,            no rebound tenderness   Extremities:   No cyanosis, clubbing, or edema   Pulses:   Pulses palpable and equal bilaterally   Skin:   No bleeding or rash       Neurologic:   Cranial nerves 2 - 12 grossly intact, sensation intact             arformoterol 15 mcg Nebulization BID - RT   aspirin EC 81 mg Oral Daily   budesonide 0.5 mg Nebulization BID - RT   busPIRone 7.5 mg Oral BID   calcium-vitamin D 1,000 mg Oral Q12H   digoxin 125 mcg Oral Daily   doxycycline 100 mg Oral Q12H   fluticasone 2 spray Each Nare Daily   heparin (porcine) " 5,000 Units Subcutaneous Q12H   ipratropium-albuterol 3 mL Nebulization Q4H - RT   metoprolol succinate  mg Oral Daily   nystatin  Topical Q12H   pantoprazole 40 mg Oral Q AM   predniSONE 40 mg Oral Daily With Breakfast            Results Review:      Results from last 7 days  Lab Units 11/01/17  1059  10/31/17  1148   SODIUM mmol/L 149*  < > 149*   POTASSIUM mmol/L  --   --  3.8   CHLORIDE mmol/L  --   --  93*   CO2 mmol/L  --   --  44.5*   BUN mg/dL  --   --  39*   CREATININE mg/dL  --   --  0.88   GLUCOSE mg/dL  --   --  250*   CALCIUM mg/dL  --   --  8.8   < > = values in this interval not displayed.        Results from last 7 days  Lab Units 10/29/17  0737   WBC 10*3/mm3 8.20   HEMOGLOBIN g/dL 10.6*   HEMATOCRIT % 37.6   PLATELETS 10*3/mm3 166                     @LABRCNT(bnp)@  I reviewed the patient's new clinical results.  I personally viewed and interpreted the patient's EKG/Telemetry data        Assessment/Plan   1.  Acute on chronic respiratory failure with hypoxia and hypercapnia  2.  Acute on chronic diastolic heart failure - continue to hold diuresis   3.  Aflutter - rates reasonably well controlled. Continue BB and digoxin  4.  Essential hypertension - stable  5.  Hypernatremia:   6.  Acute exacerbation of COPD  7. DNR     -Patient's oxygenation is at baseline.  She is maintaining saturations greater than 90% on 4 L nasal cannula.  -Plan on restarting Lasix 40 mg daily tomorrow  -No additional cardiac evaluation at this time. I will sign off.   -F/u with Ct Meléndez 1 week.

## 2017-11-01 NOTE — THERAPY TREATMENT NOTE
Acute Care - Physical Therapy Treatment Note  [facility]     Patient Name: Pooja Duque  : 1931  MRN: [id number]  Today's Date: 2017  Onset of Illness/Injury or Date of Surgery Date: 10/22/17  Date of Referral to PT: 10/25/17       Admit Date: 10/22/2017    Visit Dx:    ICD-10-CM ICD-9-CM   1. Acute systolic congestive heart failure I50.21 428.21     428.0   2. Difficulty walking R26.2 719.7     Patient Active Problem List   Diagnosis   • Abnormal weight gain   • Acute upper respiratory infection   • [place] esophagus   • Benign colonic polyp   • Atherosclerosis of coronary artery   • COPD, severe   • Chest pressure   • Acute on chronic diastolic heart failure   • CN (constipation)   • Acute exacerbation of chronic obstructive airways disease   • Bleeding from the nose   • Bloodgood disease   • Hypertension   • Hypoxia   • Eczema intertrigo   • Leg cramp   • Chronic low back pain   • OP (osteoporosis)   • Left leg pain   • Basal cell papilloma   • Superficial thrombophlebitis of leg   • On home O2   • Intractable vomiting with nausea   • Acute on chronic respiratory failure with hypoxia   • Hospital discharge follow-up   • Bilateral leg edema   • Anemia   • Abdominal bloating   • Medicare annual wellness visit, initial   • Wheelchair bound   • Pneumonia of right lower lobe due to infectious organism   • Atrial flutter   • Non-intractable vomiting with nausea   • Acute kidney injury   • Acute respiratory failure with hypoxia and hypercapnia   • DNR (do not resuscitate)   • Hypernatremia               Adult Rehabilitation Note       17 1000 10/31/17 1000       Rehab Assessment/Intervention    Discipline physical therapy assistant  -RW physical therapy assistant  -RW     Document Type therapy note (daily note)  -RW therapy note (daily note)  -RW     Subjective Information agree to therapy  -RW agree to therapy  -RW     Patient Effort, Rehab Treatment good  -RW good  -RW      Precautions/Limitations fall precautions;oxygen therapy device and L/min   6L highflow  -RW fall precautions;oxygen therapy device and L/min   6L high flow  -RW     Recorded by [RW] Yaneth Toscano PTA [RW] Yaneth Toscano PTA     Vital Signs    Pretreatment Heart Rate (beats/min)  110  -RW     Pre SpO2 (%) 95  -RW 94  -RW     O2 Delivery Pre Treatment supplemental O2   6L  -RW supplemental O2   6L  -RW     Intra SpO2 (%)  85  -RW     O2 Delivery Intra Treatment supplemental O2   6L  -RW supplemental O2   6L  -RW     Post SpO2 (%) 95  -RW 90  -RW     O2 Delivery Post Treatment supplemental O2   6L  -RW supplemental O2   6L  -RW     Recorded by [RW] Yaneth Toscano PTA [RW] Yaneth Toscano PTA     Pain Assessment    Pain Assessment No/denies pain  -RW No/denies pain  -RW     Recorded by [RW] Yaneth Toscano PTA [RW] Yaneth Toscano PTA     Cognitive Assessment/Intervention    Current Cognitive/Communication Assessment functional  -RW functional  -RW     Orientation Status oriented x 4  -RW oriented x 4  -RW     Follows Commands/Answers Questions 100% of the time;needs cueing  -% of the time;needs cueing  -RW     Personal Safety mild impairment;at risk behaviors demonstrated  -RW mild impairment  -RW     Personal Safety Interventions fall prevention program maintained;gait belt;nonskid shoes/slippers when out of bed  -RW fall prevention program maintained;gait belt;nonskid shoes/slippers when out of bed  -RW     Recorded by [RW] Yaneth Toscano PTA [RW] Yaneth Toscano PTA     Bed Mobility, Assessment/Treatment    Bed Mob, Supine to Sit, [place] not tested  -RW not tested  -RW     Bed Mob, Sit to Supine, [place] not tested  -RW not tested  -RW     Bed Mobility, Comment Pt up in chair  -RW Pt up in chair  -RW     Recorded by [RW] Yaneth Toscano PTA [RW] Yaneth Toscano PTA     Transfer Assessment/Treatment    Transfers, Bed-Chair [place]  minimum assist (75% patient effort);verbal cues  required  -RW     Transfers, Chair-Bed [place]  minimum assist (75% patient effort);verbal cues required  -RW     Transfers, Sit-Stand [place] minimum assist (75% patient effort);verbal cues required  -RW minimum assist (75% patient effort)  -RW     Transfers, Stand-Sit [place] minimum assist (75% patient effort);verbal cues required  -RW contact guard assist;verbal cues required  -RW     Transfers, Sit-Stand-Sit, Assist Device rolling walker  -RW rolling walker  -RW     Toilet Transfer, [place]  minimum assist (75% patient effort);verbal cues required  -RW     Toilet Transfer, Assistive Device  bedside commode without drop arms  -RW     Transfer, Comment pt stood x3  -RW Pt stood x3  -RW     Recorded by [RW] Yaneth Toscano PTA [RW] Yaneth Toscano PTA     Gait Assessment/Treatment    Gait, [place] Level contact guard assist;minimum assist (75% patient effort);verbal cues required  -RW contact guard assist;verbal cues required  -RW     Gait, Assistive Device rollator  -RW rolling walker  -RW     Gait, Distance (Feet) 95   w/ 2 seated rest breaks  -RW 55   x2 w/ seated rest break  -RW     Gait, Gait Pattern Analysis swing-through gait  -RW swing-through gait  -RW     Gait, Gait Deviations forward flexed posture;bilateral:;lynda decreased;narrow base;step length decreased  -RW forward flexed posture;bilateral:;lynda decreased;narrow base;step length decreased;stride length decreased  -RW     Gait, Safety Issues balance decreased during turns;step length decreased;supplemental O2   6L high flow  -RW step length decreased;supplemental O2  -RW     Gait, Impairments strength decreased;impaired balance  -RW strength decreased;impaired balance  -RW     Gait, Comment 2 seated rest breaks due to fatigue. Rest breaks after 35'  -RW 1 seated rest break due to dyspnea  -RW     Recorded by [RW] Yaneth Toscano PTA [RW] Yaneth Toscano PTA     Positioning and Restraints    Pre-Treatment Position  sitting in chair/recliner  -RW sitting in chair/recliner  -RW     Post Treatment Position chair  -RW chair  -RW     In Chair sitting;call light within reach;encouraged to call for assist;exit alarm on  -RW sitting;call light within reach;encouraged to call for assist;exit alarm on  -RW     Recorded by [RW] Yaneth Toscano PTA [RW] Yaneth Toscano PTA       User Key  (r) = Recorded By, (t) = Taken By, (c) = Cosigned By    Initials Name Effective Dates    RW Yaneth Toscano PTA 04/06/16 -                 IP PT Goals       10/25/17 1359          Bed Mobility PT LTG    Bed Mobility PT LTG, Time to Achieve 1 wk  -EF      Bed Mobility PT LTG, Activity Type all bed mobility  -EF      Bed Mobility PT LTG, [place] Level conditional independence;supervision required  -EF      Transfer Training PT LTG    Transfer Training PT LTG, Time to Achieve 1 wk  -EF      Transfer Training PT LTG, Activity Type all transfers  -EF      Transfer Training PT LTG, [place] Level contact guard assist  -EF      Transfer Training PT LTG, Assist Device walker, rolling  -EF      Gait Training PT LTG    Gait Training Goal PT LTG, Time to Achieve 1 wk  -EF      Gait Training Goal PT LTG, [place] Level contact guard assist  -EF      Gait Training Goal PT LTG, Assist Device walker, rolling  -EF      Gait Training Goal PT LTG, Distance to Achieve 40  -EF        User Key  (r) = Recorded By, (t) = Taken By, (c) = Cosigned By    Initials Name Provider Type    EF Bhargavi Briceno, PT Physical Therapist          Physical Therapy Education     Title: PT OT SLP Therapies (Active)     Topic: Physical Therapy (Active)     Point: Mobility training (Done)    Learning Progress Summary    Learner Readiness Method Response Comment Documented by Status   Patient Acceptance ERICKEY D VU,NR   11/01/17 1035 Done    Acceptance ERICKEY D VU,NR   10/31/17 1103 Done    Acceptance E WINIFRED   10/27/17 1755 Done    Acceptance E VU   10/27/17 1243 Done     Acceptance E,TB,D VU,NR   10/26/17 1210 Done    Acceptance E NR  EF 10/25/17 1359 Active               Point: Home exercise program (Active)    Learning Progress Summary    Learner Readiness Method Response Comment Documented by Status   Patient Acceptance E NR  EF 10/25/17 1359 Active               Point: Body mechanics (Done)    Learning Progress Summary    Learner Readiness Method Response Comment Documented by Status   Patient Acceptance E,TB,D VU,NR   11/01/17 1035 Done    Acceptance E,TB,D VU,NR   10/31/17 1103 Done    Acceptance E VU  RW 10/27/17 1243 Done    Acceptance E,TB,D VU,NR   10/26/17 1210 Done    Acceptance E NR  EF 10/25/17 1359 Active               Point: Precautions (Done)    Learning Progress Summary    Learner Readiness Method Response Comment Documented by Status   Patient Acceptance E,TB,D VU,NR  RW 11/01/17 1035 Done    Acceptance E,TB,D VU,NR   10/31/17 1103 Done    Acceptance E VU   10/27/17 1755 Done    Acceptance E VU   10/27/17 1243 Done    Acceptance E,TB,D VU,NR   10/26/17 1210 Done    Acceptance E NR   10/25/17 1359 Active                      User Key     Initials Effective Dates Name Provider Type Discipline     04/24/15 -  Bhargavi Briceno, PT Physical Therapist PT     03/03/17 -  Milagro Oneill, RN Registered Nurse Nurse     04/06/16 -  Yaneth Toscano PTA Physical Therapy Assistant PT                    PT Recommendation and Plan  Anticipated Discharge Disposition: skilled nursing facility  PT Frequency: daily  Plan of Care Review  Plan Of Care Reviewed With: patient  Outcome Summary/Follow up Plan: Pt increased ambulation distance slightly, but required 2 seated rest breaks due to fatigue and dyspnea. A little more unsteady today w/ ambulation.          Outcome Measures       11/01/17 1000 10/31/17 1100       How much help from another person do you currently need...    Turning from your back to your side while in flat bed without using bedrails? 3   -RW 3  -RW     Moving from lying on back to sitting on the side of a flat bed without bedrails? 3  -RW 3  -RW     Moving to and from a bed to a chair (including a wheelchair)? 3  -RW 3  -RW     Standing up from a chair using your arms (e.g., wheelchair, bedside chair)? 3  -RW 3  -RW     Climbing 3-5 steps with a railing? 1  -RW 1  -RW     To walk in hospital room? 3  -RW 3  -RW     AM-PAC 6 Clicks Score 16  -RW 16  -RW     Functional Assessment    Outcome Measure Options AM-PAC 6 Clicks Basic Mobility (PT)  -RW AM-PAC 6 Clicks Basic Mobility (PT)  -RW       User Key  (r) = Recorded By, (t) = Taken By, (c) = Cosigned By    Initials Name Provider Type    RW Yaneth Toscano PTA Physical Therapy Assistant           Time Calculation:         PT Charges       11/01/17 1030          Time Calculation    Start Time 1012  -RW      Stop Time 1030  -RW      Time Calculation (min) 18 min  -RW      PT Received On 11/01/17  -RW      PT - Next Appointment 11/02/17  -RW        User Key  (r) = Recorded By, (t) = Taken By, (c) = Cosigned By    Initials Name Provider Type    MARYELLEN Toscano PTA Physical Therapy Assistant          Therapy Charges for Today     Code Description Service Date Service Provider Modifiers Qty    12550519850 HC PT THER PROC EA 15 MIN 10/31/2017 Yaneth Toscano PTA GP 2    17390915983 HC PT THER PROC EA 15 MIN 11/1/2017 Yaneth Toscano PTA GP 1          PT G-Codes  Outcome Measure Options: AM-PAC 6 Clicks Basic Mobility (PT)    Yaneth Toscano PTA  11/1/2017

## 2017-11-02 NOTE — PROGRESS NOTES
Daily Progress Note.     11/2/2017    Pooja Duque ,  85 y.o. ,female  [facility]      Brief problem (summary)  · Acute on chronic respiratory failure  · COPD exacerbation  · CHF  · Pleural effusion, on the right side  · Immobility syndrome        Today, bleeding from heparin injection site, heparin on hold.  She walked 50 feet with PT    PMS/FH/SH: reviewed and unchanged.  Medications:     arformoterol 15 mcg Nebulization BID - RT   aspirin EC 81 mg Oral Daily   budesonide 0.5 mg Nebulization BID - RT   busPIRone 7.5 mg Oral BID   calcium-vitamin D 1,000 mg Oral Q12H   digoxin 125 mcg Oral Daily   doxycycline 100 mg Oral Q12H   fluticasone 2 spray Each Nare Daily   heparin (porcine) 5,000 Units Subcutaneous Q12H   ipratropium-albuterol 3 mL Nebulization Q4H - RT   metoprolol succinate  mg Oral Daily   nystatin  Topical Q12H   pantoprazole 40 mg Oral Q AM   predniSONE 40 mg Oral Daily With Breakfast          ROS:  Respiratory ROS: positive for - shortness of breath    Objective  Temp:  [97.6 °F (36.4 °C)-97.8 °F (36.6 °C)] 97.6 °F (36.4 °C)  Heart Rate:  [] 94  Resp:  [16-20] 16  BP: (120-140)/(62-82) 140/62     I/O this shift:  In: 210 [P.O.:210]  Out: -     Physical Exam   Constitutional: She is oriented to person, place, and time. She appears well-developed and well-nourished. No distress. Nasal cannula in place.   HENT:   Head: Normocephalic and atraumatic.   Eyes: Pupils are equal, round, and reactive to light. No scleral icterus.   Cardiovascular: Normal rate and regular rhythm.    Pulmonary/Chest: Effort normal. No respiratory distress. She has no decreased breath sounds. She has no wheezes.   Abdominal: Soft. Bowel sounds are normal. There is no hepatosplenomegaly.   Neurological: She is alert and oriented to person, place, and time.   Skin: No cyanosis. Nails show no clubbing.   Psychiatric: She has a normal mood and affect. Her behavior is normal.        CT  CT  shows significant emphysema  Small pleural effusion on the right side,  No evidence of pulmonary embolism    CXR: unchanged from previous X ray       Results from last 7 days  Lab Units 11/02/17  0616 10/29/17  0737   WBC 10*3/mm3 8.80 8.20   HEMOGLOBIN g/dL 11.3* 10.6*   HEMATOCRIT % 39.9 37.6   PLATELETS 10*3/mm3 167 166       Results from last 7 days  Lab Units 11/02/17  0616 11/01/17  1059 10/31/17  1424 10/31/17  1148 10/31/17  0600   SODIUM mmol/L 149* 149* 147* 149* 154*   POTASSIUM mmol/L 3.6  --   --  3.8 4.4  4.1   CHLORIDE mmol/L 98  --   --  93* 99   CO2 mmol/L 41.4*  --   --  44.5* 47.5*   BUN mg/dL 34*  --   --  39* 39*   CREATININE mg/dL 0.75  --   --  0.88 0.78   GLUCOSE mg/dL 104*  --   --  250* 162*   CALCIUM mg/dL 8.8  --   --  8.8 8.6               ASSESSMENT/ PLAN:  · Acute on chronic respiratory failure.  I have turned down the oxygen to 4 L  .   · COPD exacerbation.  She recently was treated for pneumonia but she has significant emphysema on the CT.  I do agree with steroids but will start weaning   · Congestive heart failure, acute on chronic diastolic heart failure, add lasix, still her Pro-BNP is high  · Right pleural effusion, small  · Hypernatremia      Physical therapy to walk the patient   DC doxycycline     CODE STATUS noted  She is agreeable for rehab       Alvaro Dominguez MD, [facility]  Pulmonary, Critical Care and Sleep Medicine.  [facility]    11/2/2017 ,

## 2017-11-02 NOTE — PLAN OF CARE
Problem: Patient Care Overview (Adult)  Goal: Plan of Care Review  Outcome: Ongoing (interventions implemented as appropriate)    11/02/17 0500   Coping/Psychosocial Response Interventions   Plan Of Care Reviewed With patient   Patient Care Overview   Progress no change   Outcome Evaluation   Outcome Summary/Follow up Plan PT WAKE THRU AFTER MN THIS SHIFT. NO C/O PAIN VOIOCED. NOTED BLEEDING TO RLQ AT HEPARING INJECT SITE. PRESSURE DRESSING APPPLIED, NO C/O PAIN. DR. RO CONTACTED AND NOTIFIED OF ABOVE, ORDER RECD TO HOLD HEPARI N. AND PLACE STICKY NOTE FOR MD TO SEE. VSS.        Goal: Adult Individualization and Mutuality  Outcome: Ongoing (interventions implemented as appropriate)  Goal: Discharge Needs Assessment  Outcome: Ongoing (interventions implemented as appropriate)    Problem: Fall Risk (Adult)  Goal: Absence of Falls  Outcome: Ongoing (interventions implemented as appropriate)    Problem: Skin Integrity Impairment, Risk/Actual (Adult)  Goal: Skin Integrity/Wound Healing  Outcome: Ongoing (interventions implemented as appropriate)    Problem: Pain, Acute (Adult)  Goal: Acceptable Pain Control/Comfort Level  Outcome: Ongoing (interventions implemented as appropriate)

## 2017-11-02 NOTE — PROGRESS NOTES
Continued Stay Note  [facility]     Patient Name: Pooja Duque  MRN: [id number]  Today's Date: 11/2/2017    Admit Date: 10/22/2017          Discharge Plan       11/02/17 1228    Case Management/Social Work Plan    Plan Return to personal care at [facility] with [facility]   vs  skilled bed at [facility].    Additional Comments Spoke with Renae Aguilar who says that pt is approved at [facility]. Pt is now on O2@4L/NC.  Spoke with pt's dtr who states they would prefer that pt return to [facility].  Call to Haydee Cote ([phone]) who requested PT notes faxed (fax: [phone]).  She will have their therapist check and make recommendation.       Leanna Lord RN

## 2017-11-02 NOTE — THERAPY TREATMENT NOTE
Acute Care - Physical Therapy Treatment Note  [facility]     Patient Name: Pooja Duque  : 1931  MRN: [id number]  Today's Date: 2017  Onset of Illness/Injury or Date of Surgery Date: 10/22/17  Date of Referral to PT: 10/25/17       Admit Date: 10/22/2017    Visit Dx:    ICD-10-CM ICD-9-CM   1. Acute systolic congestive heart failure I50.21 428.21     428.0   2. Difficulty walking R26.2 719.7     Patient Active Problem List   Diagnosis   • Abnormal weight gain   • Acute upper respiratory infection   • [place] esophagus   • Benign colonic polyp   • Atherosclerosis of coronary artery   • COPD, severe   • Chest pressure   • Acute on chronic diastolic heart failure   • CN (constipation)   • Acute exacerbation of chronic obstructive airways disease   • Bleeding from the nose   • Bloodgood disease   • Hypertension   • Hypoxia   • Eczema intertrigo   • Leg cramp   • Chronic low back pain   • OP (osteoporosis)   • Left leg pain   • Basal cell papilloma   • Superficial thrombophlebitis of leg   • On home O2   • Intractable vomiting with nausea   • Acute on chronic respiratory failure with hypoxia   • Hospital discharge follow-up   • Bilateral leg edema   • Anemia   • Abdominal bloating   • Medicare annual wellness visit, initial   • Wheelchair bound   • Pneumonia of right lower lobe due to infectious organism   • Atrial flutter   • Non-intractable vomiting with nausea   • Acute kidney injury   • Acute respiratory failure with hypoxia and hypercapnia   • DNR (do not resuscitate)   • Hypernatremia               Adult Rehabilitation Note       17 0915 17 1000 10/31/17 1000    Rehab Assessment/Intervention    Discipline physical therapy assistant  -RW physical therapy assistant  -RW physical therapy assistant  -RW    Document Type therapy note (daily note)  -RW therapy note (daily note)  -RW therapy note (daily note)  -RW    Subjective Information agree to therapy;no complaints  -RW agree to  therapy  -RW agree to therapy  -RW    Patient Effort, Rehab Treatment good  -RW good  -RW good  -RW    Precautions/Limitations fall precautions;oxygen therapy device and L/min   4L O2  -RW fall precautions;oxygen therapy device and L/min   6L highflow  -RW fall precautions;oxygen therapy device and L/min   6L high flow  -RW    Recorded by [RW] Yaneth Toscano PTA [RW] Yaneth Toscano PTA [RW] Yaneth Toscano PTA    Vital Signs    Pretreatment Heart Rate (beats/min)   110  -RW    Pre SpO2 (%) 92  -RW 95  -RW 94  -RW    O2 Delivery Pre Treatment supplemental O2   4L  -RW supplemental O2   6L  -RW supplemental O2   6L  -RW    Intra SpO2 (%) 84  -RW  85  -RW    O2 Delivery Intra Treatment supplemental O2   4L  -RW supplemental O2   6L  -RW supplemental O2   6L  -RW    Post SpO2 (%) 90  -RW 95  -RW 90  -RW    O2 Delivery Post Treatment supplemental O2   4L  -RW supplemental O2   6L  -RW supplemental O2   6L  -RW    Recorded by [RW] Yaneth Toscano PTA [RW] Yaneth Toscano, GWEN [RW] Yaneth Toscano PTA    Pain Assessment    Pain Assessment No/denies pain  -RW No/denies pain  -RW No/denies pain  -RW    Recorded by [RW] Yaneth Toscano PTA [RW] Yaneth Toscano PTA [RW] Yaneth Toscano PTA    Cognitive Assessment/Intervention    Current Cognitive/Communication Assessment functional  -RW functional  -RW functional  -RW    Orientation Status oriented x 4  -RW oriented x 4  -RW oriented x 4  -RW    Follows Commands/Answers Questions 100% of the time  -% of the time;needs cueing  -% of the time;needs cueing  -RW    Personal Safety WNL/WFL  -RW mild impairment;at risk behaviors demonstrated  -RW mild impairment  -RW    Personal Safety Interventions fall prevention program maintained;gait belt;nonskid shoes/slippers when out of bed  -RW fall prevention program maintained;gait belt;nonskid shoes/slippers when out of bed  -RW fall prevention program maintained;gait belt;nonskid shoes/slippers when out of bed  -RW     Recorded by [RW] Yaneth Toscano, GWEN [RW] Yaneth Toscano, WGEN [RW] Yaneth Toscano PTA    Bed Mobility, Assessment/Treatment    Bed Mobility, Assistive Device bed rails;head of bed elevated  -RW      Bed Mob, Supine to Sit, [place] supervision required  -RW not tested  -RW not tested  -RW    Bed Mob, Sit to Supine, [place] not tested   Pt up in chair  -RW not tested  -RW not tested  -RW    Bed Mobility, Comment  Pt up in chair  -RW Pt up in chair  -RW    Recorded by [RW] Yaneth Toscano, GWEN [RW] Yaneth Toscano, GWEN [RW] Yaneth Toscano PTA    Transfer Assessment/Treatment    Transfers, Bed-Chair [place]   minimum assist (75% patient effort);verbal cues required  -RW    Transfers, Chair-Bed [place]   minimum assist (75% patient effort);verbal cues required  -RW    Transfers, Sit-Stand [place] contact guard assist;verbal cues required  -RW minimum assist (75% patient effort);verbal cues required  -RW minimum assist (75% patient effort)  -RW    Transfers, Stand-Sit [place] contact guard assist;verbal cues required  -RW minimum assist (75% patient effort);verbal cues required  -RW contact guard assist;verbal cues required  -RW    Transfers, Sit-Stand-Sit, Assist Device rolling walker   rollator  -RW rolling walker  -RW rolling walker  -RW    Toilet Transfer, [place]   minimum assist (75% patient effort);verbal cues required  -RW    Toilet Transfer, Assistive Device   bedside commode without drop arms  -RW    Transfer, Comment Pt stood x2  -RW pt stood x3  -RW Pt stood x3  -RW    Recorded by [RW] aYneth Toscano, GWEN [RW] Yaneth Toscano, PTA [RW] Yaneth Toscano PTA    Gait Assessment/Treatment    Gait, [place] Level contact guard assist;verbal cues required  -RW contact guard assist;minimum assist (75% patient effort);verbal cues required  -RW contact guard assist;verbal cues required  -RW    Gait, Assistive Device rollator  -RW rollator  -RW rolling walker  -RW     Gait, Distance (Feet) 50   x2 w/ seated rest break  -RW 95   w/ 2 seated rest breaks  -RW 55   x2 w/ seated rest break  -RW    Gait, Gait Pattern Analysis swing-through gait  -RW swing-through gait  -RW swing-through gait  -RW    Gait, Gait Deviations forward flexed posture;bilateral:;lynda decreased;narrow base;step length decreased;stride length decreased  -RW forward flexed posture;bilateral:;lynda decreased;narrow base;step length decreased  -RW forward flexed posture;bilateral:;lynda decreased;narrow base;step length decreased;stride length decreased  -RW    Gait, Safety Issues step length decreased;supplemental O2   4L O2  -RW balance decreased during turns;step length decreased;supplemental O2   6L high flow  -RW step length decreased;supplemental O2  -RW    Gait, Impairments strength decreased  -RW strength decreased;impaired balance  -RW strength decreased;impaired balance  -RW    Gait, Comment 1 seated rest break due to fatigue and dyspnea  -RW 2 seated rest breaks due to fatigue. Rest breaks after 35'  -RW 1 seated rest break due to dyspnea  -RW    Recorded by [RW] Yaneth Toscano PTA [RW] Yaneth Toscano PTA [RW] Yaneth Toscano PTA    Positioning and Restraints    Pre-Treatment Position in bed  -RW sitting in chair/recliner  -RW sitting in chair/recliner  -RW    Post Treatment Position chair  -RW chair  -RW chair  -RW    In Chair sitting;call light within reach;encouraged to call for assist;exit alarm on  -RW sitting;call light within reach;encouraged to call for assist;exit alarm on  -RW sitting;call light within reach;encouraged to call for assist;exit alarm on  -RW    Recorded by [RW] Yaneth Toscano PTA [RW] Yaneth Toscano PTA [RW] Yaneth Toscano PTA      User Key  (r) = Recorded By, (t) = Taken By, (c) = Cosigned By    Initials Name Effective Dates    MARYELLEN Toscano PTA 04/06/16 -                 IP PT Goals       10/25/17 6167          Bed Mobility PT LTG    Bed Mobility PT LTG,  Time to Achieve 1 wk  -EF      Bed Mobility PT LTG, Activity Type all bed mobility  -EF      Bed Mobility PT LTG, [place] Level conditional independence;supervision required  -EF      Transfer Training PT LTG    Transfer Training PT LTG, Time to Achieve 1 wk  -EF      Transfer Training PT LTG, Activity Type all transfers  -EF      Transfer Training PT LTG, [place] contact guard assist  -EF      Transfer Training PT LTG, Assist Device walker, rolling  -EF      Gait Training PT LTG    Gait Training Goal PT LTG, Time to Achieve 1 wk  -EF      Gait Training Goal PT LTG, [place] contact guard assist  -EF      Gait Training Goal PT LTG, Assist Device walker, rolling  -EF      Gait Training Goal PT LTG, Distance to Achieve 40  -EF        User Key  (r) = Recorded By, (t) = Taken By, (c) = Cosigned By    Initials Name Provider Type    ELSA Briceno, PT Physical Therapist          Physical Therapy Education     Title: PT OT SLP Therapies (Active)     Topic: Physical Therapy (Active)     Point: Mobility training (Done)    Learning Progress Summary    Learner Readiness Method Response Comment Documented by Status   Patient Acceptance E,TB,D VU,[facility] 11/02/17 0928 Done    Acceptance E,TB,D VU,NR   11/01/17 1035 Done    Acceptance E,TB,D VU,NR   10/31/17 1103 Done    Acceptance E VU   10/27/17 1755 Done    Acceptance E VU   10/27/17 1243 Done    Acceptance E,TB,D VU,NR   10/26/17 1210 Done    Acceptance E NR   10/25/17 1359 Active               Point: Home exercise program (Active)    Learning Progress Summary    Learner Readiness Method Response Comment Documented by Status   Patient Acceptance E NR   10/25/17 1359 Active               Point: Body mechanics (Done)    Learning Progress Summary    Learner Readiness Method Response Comment Documented by Status   Patient Acceptance E,TB,D VU,NR   11/02/17 0928 Done    Acceptance E,TB,D VU,[facility] 11/01/17 1035 Done    Acceptance  E,TB,D VU,NR  RW 10/31/17 1103 Done    Acceptance E VU  RW 10/27/17 1243 Done    Acceptance E,TB,D VU,NR  RW 10/26/17 1210 Done    Acceptance E NR  EF 10/25/17 1359 Active               Point: Precautions (Done)    Learning Progress Summary    Learner Readiness Method Response Comment Documented by Status   Patient Acceptance E,TB,D VU,NR  RW 11/02/17 0928 Done    Acceptance E,TB,D VU,NR  RW 11/01/17 1035 Done    Acceptance E,TB,D VU,NR  RW 10/31/17 1103 Done    Acceptance E VU  CC 10/27/17 1755 Done    Acceptance E VU  RW 10/27/17 1243 Done    Acceptance E,TB,D VU,NR  RW 10/26/17 1210 Done    Acceptance E NR  EF 10/25/17 1359 Active                      User Key     Initials Effective Dates Name Provider Type Discipline     04/24/15 -  Bhargavi Briceno, PT Physical Therapist PT    CC 03/03/17 -  Milagro Oneill, RN Registered Nurse Nurse     04/06/16 -  Yaneth Toscano, PTA Physical Therapy Assistant PT                    PT Recommendation and Plan  Anticipated Discharge Disposition: skilled nursing facility  PT Frequency: daily  Plan of Care Review  Plan Of Care Reviewed With: patient  Outcome Summary/Follow up Plan: Pt requiring less assist for transfers and ambulation. On 4L O2 today and took longer to recover after ambulation.           Outcome Measures       11/02/17 0900 11/01/17 1000 10/31/17 1100    How much help from another person do you currently need...    Turning from your back to your side while in flat bed without using bedrails? 3  -RW 3  -RW 3  -RW    Moving from lying on back to sitting on the side of a flat bed without bedrails? 3  -RW 3  -RW 3  -RW    Moving to and from a bed to a chair (including a wheelchair)? 3  -RW 3  -RW 3  -RW    Standing up from a chair using your arms (e.g., wheelchair, bedside chair)? 3  -RW 3  -RW 3  -RW    Climbing 3-5 steps with a railing? 1  -RW 1  -RW 1  -RW    To walk in hospital room? 3  -RW 3  -RW 3  -RW    AM-PAC 6 Clicks Score 16  -RW 16  -RW 16  -RW     Functional Assessment    Outcome Measure Options AM-PAC 6 Clicks Basic Mobility (PT)  -RW AM-PAC 6 Clicks Basic Mobility (PT)  -RW AM-PAC 6 Clicks Basic Mobility (PT)  -RW      User Key  (r) = Recorded By, (t) = Taken By, (c) = Cosigned By    Initials Name Provider Type     Yaneth Toscano PTA Physical Therapy Assistant           Time Calculation:         PT Charges       11/02/17 0913          Time Calculation    Start Time 0905  -RW      Stop Time 0929  -      Time Calculation (min) 24 min  -RW      PT Received On 11/02/17  -      PT - Next Appointment 11/03/17  -        User Key  (r) = Recorded By, (t) = Taken By, (c) = Cosigned By    Initials Name Provider Type     Yaneth Toscano PTA Physical Therapy Assistant          Therapy Charges for Today     Code Description Service Date Service Provider Modifiers Qty    18897346468 HC PT THER PROC EA 15 MIN 11/1/2017 Yaneth Toscano PTA GP 1    91291477062 HC PT THER PROC EA 15 MIN 11/2/2017 Yaneth Toscano PTA GP 2          PT G-Codes  Outcome Measure Options: AM-PAC 6 Clicks Basic Mobility (PT)    Yaneth Toscano PTA  11/2/2017

## 2017-11-02 NOTE — PLAN OF CARE
Problem: Patient Care Overview (Adult)  Goal: Plan of Care Review  Outcome: Ongoing (interventions implemented as appropriate)    11/02/17 8318   Coping/Psychosocial Response Interventions   Plan Of Care Reviewed With patient   Outcome Evaluation   Outcome Summary/Follow up Plan Pt requiring less assist for transfers and ambulation. On 4L O2 today and took longer to recover after ambulation.

## 2017-11-02 NOTE — PROGRESS NOTES
Continued Stay Note  [facility]     Patient Name: Pooja Duque  MRN: [id number]  Today's Date: 11/2/2017    Admit Date: 10/22/2017          Discharge Plan       11/02/17 1810    Case Management/Social Work Plan    Plan Return to personal care at [facility]  vs skilled bed at [facility].    Additional Comments Notified by Lul[facility]/ Santos Cote that their DON will come to [facility] tomorrow and eval to make sure pt is safe to return.  Bed is available at [facility] if rehab is needed.            Leanna Lord RN

## 2017-11-02 NOTE — PROGRESS NOTES
[facility]     LOS: 11 days     Subjective:  soa is better  Looks a llittle better each day  No cp  eating well    Objective:    Vital Signs:  Temp:  [97.6 °F (36.4 °C)-97.8 °F (36.6 °C)] 97.6 °F (36.4 °C)  Heart Rate:  [] 94  Resp:  [16-20] 16  BP: (120-140)/(62-82) 140/62    Last 2 weights    11/01/17  0500 11/02/17  0500   Weight: 200 lb (90.7 kg) 202 lb 8 oz (91.9 kg)       General Appearance: no acute distress, appears comfortable  Heart: regular rate and rhythm  Lungs: clear to auscultation bilaterally, less labored respirations, ok air entry but decreased  Abdomen: soft, non-tender, no guarding, no rebound, non-distended  Extremities: no edema, no cyanosis  Neurology: speech normal, CN grossly normal  Psychiatric: normal mood and affect    Results Review:    Glucose   Date Value Ref Range Status   11/02/2017 104 (H) 65 - 99 mg/dL Final   10/31/2017 250 (H) 65 - 99 mg/dL Final   10/31/2017 162 (H) 65 - 99 mg/dL Final       Results from last 7 days  Lab Units 11/02/17  0616   WBC 10*3/mm3 8.80   HEMOGLOBIN g/dL 11.3*   HEMATOCRIT % 39.9   PLATELETS 10*3/mm3 167       Results from last 7 days  Lab Units 11/02/17  0616   SODIUM mmol/L 149*   POTASSIUM mmol/L 3.6   CHLORIDE mmol/L 98   CO2 mmol/L 41.4*   BUN mg/dL 34*   CREATININE mg/dL 0.75   CALCIUM mg/dL 8.8   GLUCOSE mg/dL 104*                          I have reviewed daily medications and changes in CPOE    Scheduled meds    arformoterol 15 mcg Nebulization BID - RT   aspirin EC 81 mg Oral Daily   budesonide 0.5 mg Nebulization BID - RT   busPIRone 7.5 mg Oral BID   calcium-vitamin D 1,000 mg Oral Q12H   digoxin 125 mcg Oral Daily   fluticasone 2 spray Each Nare Daily   heparin (porcine) 5,000 Units Subcutaneous Q12H   ipratropium-albuterol 3 mL Nebulization Q4H - RT   metoprolol succinate  mg Oral Daily   nystatin  Topical Q12H   pantoprazole 40 mg Oral Q AM   [START ON 11/3/2017] predniSONE 20 mg Oral Daily With  Breakfast          PRN meds  •  acetaminophen  •  albuterol  •  LORazepam  •  potassium chloride **OR** potassium chloride **OR** potassium chloride  •  sennosides-docusate sodium  •  sodium chloride  •  sodium chloride  •  Insert peripheral IV **AND** sodium chloride      Principal Problem:    Acute respiratory failure with hypoxia and hypercapnia  Active Problems:    Acute on chronic diastolic heart failure    Acute exacerbation of chronic obstructive airways disease    Hypertension    Chronic low back pain    On home O2    DNR (do not resuscitate)    Hypernatremia        Assessment/Plan:        Acute respiratory failure with hypoxia and hypercapnia- solumedrol iv change to prednisone 20, pulmonary is following. Weaned to 4 liters today and sats are 89-93% while I am in the room with the patient.  -Now she is walking about 95 feet and much less soa.      Acute on chronic diastolic heart failure- bnp elevated, sodium is high, lasix 40 IV being held, cardiology is following.  Will need to restart lasix on d/c.      Hypertension      Chronic low back pain    Copd-brovanamos pulmicort      DNR (do not resuscitate)      Hypernatremia-slightly better today, I think this is a marker that the patient currently intravascularly depleted; will give free water between meals to see If we can get her sodium down prior to d/c and restarting lasix.    Elevated- bs a1c 5.9, monitor    Swallow eval noted- speech therapy: Diet Textures: nectar thick liquid, mechanical soft consistency with no mixed textures food. Medications should be taken whole with thickened liquids or puree. May have Ice between meals after oral care, under staff or family supervision and with the recommended strategies for safe swallowing.    Bleeding at the heparin injection site, check coags and stop heparin, try to continue aspirin if possible; monitor      >35 minutes spent, > 1/2 time spent counseling and coordination of care     Talked with daughter in law  today      Felix Reagan MD  11/02/17  11:00 AM

## 2017-11-02 NOTE — NURSING NOTE
RLQ INJECTION SITE CONTINUE TO HAVE SCANT BLEED, PRESSURE DRSG MAINTAINED. SURROUNDING AREA BRUISED.

## 2017-11-02 NOTE — PLAN OF CARE
Problem: Inpatient SLP  Goal: Dysphagia- Patient will safely consume diet as per recommendation with no signs/symptoms of aspiration    11/02/17 1404   Safely Consume Diet   Safely Consume Diet- SLP, Time to Achieve by discharge   Safely Consume Diet- SLP, Activity Level Patient will improve laryngeal elevation for safer, more efficient swallow   Safely Consume Diet- SLP, Outcome goal ongoing

## 2017-11-02 NOTE — THERAPY TREATMENT NOTE
Acute Care - Speech Language Pathology   Swallow Treatment Note [facility]     Patient Name: Pooja Duque  : 1931  MRN: [id number]  Today's Date: 2017  Onset of Illness/Injury or Date of Surgery Date: 10/22/17            Admit Date: 10/22/2017    Visit Dx:      ICD-10-CM ICD-9-CM   1. Acute systolic congestive heart failure I50.21 428.21     428.0   2. Difficulty walking R26.2 719.7     Patient Active Problem List   Diagnosis   • Abnormal weight gain   • Acute upper respiratory infection   • [place] esophagus   • Benign colonic polyp   • Atherosclerosis of coronary artery   • COPD, severe   • Chest pressure   • Acute on chronic diastolic heart failure   • CN (constipation)   • Acute exacerbation of chronic obstructive airways disease   • Bleeding from the nose   • Bloodgood disease   • Hypertension   • Hypoxia   • Eczema intertrigo   • Leg cramp   • Chronic low back pain   • OP (osteoporosis)   • Left leg pain   • Basal cell papilloma   • Superficial thrombophlebitis of leg   • On home O2   • Intractable vomiting with nausea   • Acute on chronic respiratory failure with hypoxia   • Hospital discharge follow-up   • Bilateral leg edema   • Anemia   • Abdominal bloating   • Medicare annual wellness visit, initial   • Wheelchair bound   • Pneumonia of right lower lobe due to infectious organism   • Atrial flutter   • Non-intractable vomiting with nausea   • Acute kidney injury   • Acute respiratory failure with hypoxia and hypercapnia   • DNR (do not resuscitate)   • Hypernatremia     Pt stated she is very frustrated with this admit.  Pt strongly dislikes most of the thickened liquids.  Pt refuses thickened water, but states she may drink some of the thickened juices. Pt states she will be compliant with the water protocal guidelines when she can.  She said it is hard for her to complete oral care due to her oxygen needs and shortness of breath.  Encouraged pt to at least swab her mouth with  mouthwash.  Pt said she is willing to complete swallow exercises with the assistance of a therapist.  Pt frustrated as she feels a medical professional told her she no longer needs to drink thickened liquids but the speech therapist is telling her she should.  Reinforced to the patient that the therapist gives the recommendations and she has the right to refuse the recommendations.  Relayed to the patient the risks of pneumonia and possible death if noncompiaint. Pt stated she has consumed thickened liquids in the past and she does not like them.  ST is uncertain she will be complaint after discharge.  The RN relayed that to her knowledge no one has told her she doesn't need thick liquids.  The order in the computer is for thickened liquids.  The patient most likely is to DC to subacute rehab.  Recommend continued speech therapy to address dysphagia after hospital DC.         Adult Rehabilitation Note       11/02/17 0915 11/01/17 1000 10/31/17 1000    Rehab Assessment/Intervention    Discipline physical therapy assistant  -RW physical therapy assistant  -RW physical therapy assistant  -RW    Document Type therapy note (daily note)  -RW therapy note (daily note)  -RW therapy note (daily note)  -RW    Subjective Information agree to therapy;no complaints  -RW agree to therapy  -RW agree to therapy  -RW    Patient Effort, Rehab Treatment good  -RW good  -RW good  -RW    Precautions/Limitations fall precautions;oxygen therapy device and L/min   4L O2  -RW fall precautions;oxygen therapy device and L/min   6L highflow  -RW fall precautions;oxygen therapy device and L/min   6L high flow  -RW    Recorded by [RW] Yaneth Toscano, PTA [RW] Yaneth Toscano, PTA [RW] Yaneth Toscano, PTA    Vital Signs    Pretreatment Heart Rate (beats/min)   110  -RW    Pre SpO2 (%) 92  -RW 95  -RW 94  -RW    O2 Delivery Pre Treatment supplemental O2   4L  -RW supplemental O2   6L  -RW supplemental O2   6L  -RW    Intra SpO2 (%) 84  -RW  85   -RW    O2 Delivery Intra Treatment supplemental O2   4L  -RW supplemental O2   6L  -RW supplemental O2   6L  -RW    Post SpO2 (%) 90  -RW 95  -RW 90  -RW    O2 Delivery Post Treatment supplemental O2   4L  -RW supplemental O2   6L  -RW supplemental O2   6L  -RW    Recorded by [RW] Yaneth Toscano PTA [RW] Yaneth Toscano, GWEN [RW] Yaneth Toscano PTA    Pain Assessment    Pain Assessment No/denies pain  -RW No/denies pain  -RW No/denies pain  -RW    Recorded by [RW] Yaneth Toscano PTA [RW] Yaneth Toscano, GWEN [RW] Yaneth Toscano PTA    Cognitive Assessment/Intervention    Current Cognitive/Communication Assessment functional  -RW functional  -RW functional  -RW    Orientation Status oriented x 4  -RW oriented x 4  -RW oriented x 4  -RW    Follows Commands/Answers Questions 100% of the time  -% of the time;needs cueing  -% of the time;needs cueing  -RW    Personal Safety WNL/WFL  -RW mild impairment;at risk behaviors demonstrated  -RW mild impairment  -RW    Personal Safety Interventions fall prevention program maintained;gait belt;nonskid shoes/slippers when out of bed  -RW fall prevention program maintained;gait belt;nonskid shoes/slippers when out of bed  -RW fall prevention program maintained;gait belt;nonskid shoes/slippers when out of bed  -RW    Recorded by [RW] Yaneth Toscano PTA [RW] Yaneth Toscano, PTA [RW] Yaneth Toscano PTA    Bed Mobility, Assessment/Treatment    Bed Mobility, Assistive Device bed rails;head of bed elevated  -RW      Bed Mob, Supine to Sit, [place] supervision required  -RW not tested  -RW not tested  -RW    Bed Mob, Sit to Supine, [place] not tested   Pt up in chair  -RW not tested  -RW not tested  -RW    Bed Mobility, Comment  Pt up in chair  -RW Pt up in chair  -RW    Recorded by [RW] Yaneth Toscano, GWEN [RW] Yaneth Toscano, PTA [RW] Yaneth Toscano PTA    Transfer Assessment/Treatment    Transfers, Bed-Chair [place]   minimum assist (75%  patient effort);verbal cues required  -RW    Transfers, Chair-Bed [place]   minimum assist (75% patient effort);verbal cues required  -RW    Transfers, Sit-Stand [place] contact guard assist;verbal cues required  -RW minimum assist (75% patient effort);verbal cues required  -RW minimum assist (75% patient effort)  -RW    Transfers, Stand-Sit [place] contact guard assist;verbal cues required  -RW minimum assist (75% patient effort);verbal cues required  -RW contact guard assist;verbal cues required  -RW    Transfers, Sit-Stand-Sit, Assist Device rolling walker   rollator  -RW rolling walker  -RW rolling walker  -RW    Toilet Transfer, [place]   minimum assist (75% patient effort);verbal cues required  -RW    Toilet Transfer, Assistive Device   bedside commode without drop arms  -RW    Transfer, Comment Pt stood x2  -RW pt stood x3  -RW Pt stood x3  -RW    Recorded by [RW] Yaneth Toscano, PTA [RW] Yaneth Toscano, PTA [RW] Yaneth Toscano, PTA    Gait Assessment/Treatment    Gait, [place] Level contact guard assist;verbal cues required  -RW contact guard assist;minimum assist (75% patient effort);verbal cues required  -RW contact guard assist;verbal cues required  -RW    Gait, Assistive Device rollator  -RW rollator  -RW rolling walker  -RW    Gait, Distance (Feet) 50   x2 w/ seated rest break  -RW 95   w/ 2 seated rest breaks  -RW 55   x2 w/ seated rest break  -RW    Gait, Gait Pattern Analysis swing-through gait  -RW swing-through gait  -RW swing-through gait  -RW    Gait, Gait Deviations forward flexed posture;bilateral:;lynda decreased;narrow base;step length decreased;stride length decreased  -RW forward flexed posture;bilateral:;lynda decreased;narrow base;step length decreased  -RW forward flexed posture;bilateral:;lynda decreased;narrow base;step length decreased;stride length decreased  -RW    Gait, Safety Issues step length decreased;supplemental O2   4L O2  -RW balance  decreased during turns;step length decreased;supplemental O2   6L high flow  -RW step length decreased;supplemental O2  -RW    Gait, Impairments strength decreased  -RW strength decreased;impaired balance  -RW strength decreased;impaired balance  -RW    Gait, Comment 1 seated rest break due to fatigue and dyspnea  -RW 2 seated rest breaks due to fatigue. Rest breaks after 35'  -RW 1 seated rest break due to dyspnea  -RW    Recorded by [RW] Yaneth Toscano PTA [RW] Yaneth Toscano PTA [RW] Yaneth Toscano PTA    Positioning and Restraints    Pre-Treatment Position in bed  -RW sitting in chair/recliner  -RW sitting in chair/recliner  -RW    Post Treatment Position chair  -RW chair  -RW chair  -RW    In Chair sitting;call light within reach;encouraged to call for assist;exit alarm on  -RW sitting;call light within reach;encouraged to call for assist;exit alarm on  -RW sitting;call light within reach;encouraged to call for assist;exit alarm on  -RW    Recorded by [RW] Yaneth Toscano PTA [RW] Yaneth Toscano, GWEN [RW] Yaneth Toscano PTA      User Key  (r) = Recorded By, (t) = Taken By, (c) = Cosigned By    Initials Name Effective Dates    RW Yaneth Toscano PTA 04/06/16 -                   IP SLP Goals       11/02/17 1404 10/30/17 1345 10/28/17 1427    Safely Consume Diet    Safely Consume Diet- SLP, Date Established  10/30/17  -AW     Safely Consume Diet- SLP, Time to Achieve by discharge  -NR  by discharge  -MG    Safely Consume Diet- SLP, Activity Level Patient will improve laryngeal elevation for safer, more efficient swallow  -NR      Safely Consume Diet- SLP, Outcome goal ongoing  -NR goal ongoing  -AW       User Key  (r) = Recorded By, (t) = Taken By, (c) = Cosigned By    Initials Name Provider Type    MG Zayda Bourne MA,CCC-SLP Speech and Language Pathologist    NR Delma Guerra MA,CCC-SLP Speech and Language Pathologist    JACQUELYN Newby, MS CCC-SLP Speech and Language Pathologist           EDUCATION  The patient has been educated in the following areas:   Dysphagia (Swallowing Impairment).    SLP Recommendation and Plan                                                   SLP Outcome Measures (last 72 hours)      SLP Outcome Measures       11/02/17 1245          SLP Outcome Measures    Outcome Measure Used? Adult NOMS  -NR      FCM Scores    FCM Chosen Swallowing  -NR      Swallowing FCM Score 4  -NR        User Key  (r) = Recorded By, (t) = Taken By, (c) = Cosigned By    Initials Name Effective Dates    NR Delma Guerra MA,EMILEE-DANNY 04/13/15 -              Time Calculation:         Time Calculation- SLP       11/02/17 1404          Time Calculation- SLP    SLP Start Time 1215  -NR      SLP Stop Time 1245  -NR      SLP Time Calculation (min) 30 min  -NR      SLP Received On 11/02/17  -NR        User Key  (r) = Recorded By, (t) = Taken By, (c) = Cosigned By    Initials Name Provider Type    NR Delma Guerra MA,EMILEE-SLP Speech and Language Pathologist          Therapy Charges for Today     Code Description Service Date Service Provider Modifiers Qty    52927525897 HC ST SWALLOWING CURRENT STATUS 11/2/2017 Delma Guerra MA,CCC-SLP MARCELA, CK 1    63395521537 HC ST SWALLOWING PROJECTED 11/2/2017 Delma Guerra MA,CCC-SLP GN, CJ 1    20706191556 HC ST SWALLOWING DISCHARGE 11/2/2017 Delma Guerra MA, CCC-SLP GN, CK 1    09966751967 HC ST TREATMENT SWALLOW 2 11/2/2017 Delma Guerra MA, CCC-SLP GN 1          SLP G-Codes  SLP NOMS Used?: Yes  Functional Limitations: Swallowing  Swallow Current Status (): At least 40 percent but less than 60 percent impaired, limited or restricted  Swallow Goal Status (): At least 20 percent but less than 40 percent impaired, limited or restricted  Swallow Discharge Status (): At least 40 percent but less than 60 percent impaired, limited or restricted      Delma Guerra MA, CCC-DANNY  11/2/2017

## 2017-11-02 NOTE — PLAN OF CARE
Problem: Patient Care Overview (Adult)  Goal: Plan of Care Review  Outcome: Ongoing (interventions implemented as appropriate)    11/02/17 7935   Coping/Psychosocial Response Interventions   Plan Of Care Reviewed With patient   Outcome Evaluation   Outcome Summary/Follow up Plan pt denies pain, bleeding of RLQ controlled, VSS, afib on monitor.       Goal: Adult Individualization and Mutuality  Outcome: Ongoing (interventions implemented as appropriate)  Goal: Discharge Needs Assessment  Outcome: Ongoing (interventions implemented as appropriate)    Problem: Fall Risk (Adult)  Goal: Absence of Falls  Outcome: Ongoing (interventions implemented as appropriate)    Problem: Skin Integrity Impairment, Risk/Actual (Adult)  Goal: Skin Integrity/Wound Healing  Outcome: Ongoing (interventions implemented as appropriate)    Problem: Pain, Acute (Adult)  Goal: Acceptable Pain Control/Comfort Level  Outcome: Ongoing (interventions implemented as appropriate)    Problem: Respiratory Insufficiency (Adult)  Goal: Acid/Base Balance  Outcome: Ongoing (interventions implemented as appropriate)  Goal: Effective Ventilation  Outcome: Ongoing (interventions implemented as appropriate)

## 2017-11-03 NOTE — PLAN OF CARE
Problem: Patient Care Overview (Adult)  Goal: Plan of Care Review  Outcome: Ongoing (interventions implemented as appropriate)    11/03/17 0516   Coping/Psychosocial Response Interventions   Plan Of Care Reviewed With patient   Patient Care Overview   Progress no change   Outcome Evaluation   Outcome Summary/Follow up Plan PT SLEPT ON AND OFF, THIS SHIFT, ALSO WATCHING TV WHEN AWAKE, DENIES ANY DISCOMFORT. VSS. NO ACUTE DISTRESS. NO S/SX OF BLEEDING. COUGHING LOOSE, NONPROD COUGH.        Goal: Adult Individualization and Mutuality  Outcome: Ongoing (interventions implemented as appropriate)  Goal: Discharge Needs Assessment  Outcome: Ongoing (interventions implemented as appropriate)    Problem: Fall Risk (Adult)  Goal: Absence of Falls  Outcome: Ongoing (interventions implemented as appropriate)    Problem: Skin Integrity Impairment, Risk/Actual (Adult)  Goal: Skin Integrity/Wound Healing  Outcome: Ongoing (interventions implemented as appropriate)    Problem: Pain, Acute (Adult)  Goal: Acceptable Pain Control/Comfort Level  Outcome: Ongoing (interventions implemented as appropriate)

## 2017-11-03 NOTE — PROGRESS NOTES
[facility]     LOS: 12 days     Name: Pooja Duque  Age: 85 y.o.  Sex: female  :  1931  MRN: [id number]         Primary Care Physician: Justin Jordan MD    Diet Dysphagia; IV - Mechanical Soft No Mixed Consistencies; Nectar / Syrup Thick; Cardiac, Consistent Carbohydrate, Renal    Subjective   Feels better overall. A little stronger    Objective   Temp:  [97.4 °F (36.3 °C)-98.4 °F (36.9 °C)] 97.5 °F (36.4 °C)  Heart Rate:  [] 68  Resp:  [16-17] 17  BP: (125-131)/(54-74) 128/54  SpO2:  [86 %-95 %] 91 %  on  Flow (L/min):  [3-4] 4;   O2 Device: nasal cannula  Body mass index is 38.58 kg/(m^2).    Physical Exam   Constitutional: She is oriented to person, place, and time. No distress.   Cardiovascular: Normal rate and regular rhythm.    Pulmonary/Chest: Effort normal. No respiratory distress. She has decreased breath sounds. She has no wheezes. She has no rhonchi.   Abdominal: Soft. There is no tenderness.   Musculoskeletal: She exhibits edema (trace).   Neurological: She is alert and oriented to person, place, and time.     Reviewed medications and new clinical results    arformoterol 15 mcg Nebulization BID - RT   aspirin EC 81 mg Oral Daily   budesonide 0.5 mg Nebulization BID - RT   busPIRone 7.5 mg Oral BID   calcium-vitamin D 1,000 mg Oral Q12H   digoxin 125 mcg Oral Daily   fluticasone 2 spray Each Nare Daily   metoprolol succinate  mg Oral Daily   nystatin  Topical Q12H   pantoprazole 40 mg Oral Q AM   predniSONE 20 mg Oral Daily With Breakfast      Results from last 7 days  Lab Units 17  0647 17  0616 10/29/17  0737   WBC 10*3/mm3 7.29 8.80 8.20   HEMOGLOBIN g/dL 11.0* 11.3* 10.6*   PLATELETS 10*3/mm3 140 167 166     Results from last 7 days  Lab Units 17  0647 17  1415 17  0616 17  1059 10/31/17  1424 10/31/17  1148 10/31/17  0600 10/30/17  0724 10/29/17  0737   SODIUM mmol/L 146* 144  149* 149* 147* 149* 154* 150* 148*   POTASSIUM mmol/L 3.6  --  3.6  --   --  3.8 4.4  4.1 2.9* 3.6   CHLORIDE mmol/L 98  --  98  --   --  93* 99 93* 99   CO2 mmol/L 43.0*  --  41.4*  --   --  44.5* 47.5* 45.2* 40.5*   BUN mg/dL 35*  --  34*  --   --  39* 39* 36* 38*   CREATININE mg/dL 0.68  --  0.75  --   --  0.88 0.78 0.80 0.97   CALCIUM mg/dL 9.4  --  8.8  --   --  8.8 8.6 8.2* 8.8   GLUCOSE mg/dL 109*  --  104*  --   --  250* 162* 226* 198*     Estimated Creatinine Clearance: 53.3 mL/min (by C-G formula based on Cr of 0.68).    Assessment/Plan   Active Hospital Problems (** Indicates Principal Problem)    Diagnosis Date Noted   • **Acute respiratory failure with hypoxia and hypercapnia [J96.01, J96.02] 10/23/2017   • Hypernatremia [E87.0] 10/26/2017   • DNR (do not resuscitate) [Z66] 10/24/2017   • Hypertension [I10] 02/01/2016   • On home O2 [Z99.81] 02/01/2016   • Chronic low back pain [M54.5, G89.29] 02/01/2016   • Acute exacerbation of chronic obstructive airways disease [J44.1] 02/01/2016   • Acute on chronic diastolic heart failure [I50.33] 02/01/2016      Resolved Hospital Problems    Diagnosis Date Noted Date Resolved   No resolved problems to display.     · Prednisone per pulm  · Restart lasix 40 mg daily soon. Monitor Na  · Previously was at assisted living facility. Home there vs skilled facility 1-2 days.    Mikie Callejas MD   11/03/17  1:36 PM

## 2017-11-03 NOTE — NURSING NOTE
Patient did not have enough fluid to complete a thoracentesis. Patient put in transport to go back up to room.

## 2017-11-03 NOTE — PLAN OF CARE
Problem: Patient Care Overview (Adult)  Goal: Plan of Care Review    11/03/17 1406   Outcome Evaluation   Outcome Summary/Follow up Plan Pt demonstrates generalized weakness and limitedactivity tolerance during PT today. Performed exercises w/ education for HEP; declined further activity or more reps.

## 2017-11-03 NOTE — THERAPY TREATMENT NOTE
Acute Care - Physical Therapy Treatment Note  [facility]     Patient Name: Pooja Duque  : 1931  MRN: [id number]  Today's Date: 11/3/2017  Onset of Illness/Injury or Date of Surgery Date: 10/22/17  Date of Referral to PT: 10/25/17       Admit Date: 10/22/2017    Visit Dx:    ICD-10-CM ICD-9-CM   1. Acute systolic congestive heart failure I50.21 428.21     428.0   2. Difficulty walking R26.2 719.7     Patient Active Problem List   Diagnosis   • Abnormal weight gain   • Acute upper respiratory infection   • [place] esophagus   • Benign colonic polyp   • Atherosclerosis of coronary artery   • COPD, severe   • Chest pressure   • Acute on chronic diastolic heart failure   • CN (constipation)   • Acute exacerbation of chronic obstructive airways disease   • Bleeding from the nose   • Bloodgood disease   • Hypertension   • Hypoxia   • Eczema intertrigo   • Leg cramp   • Chronic low back pain   • OP (osteoporosis)   • Left leg pain   • Basal cell papilloma   • Superficial thrombophlebitis of leg   • On home O2   • Intractable vomiting with nausea   • Acute on chronic respiratory failure with hypoxia   • Hospital discharge follow-up   • Bilateral leg edema   • Anemia   • Abdominal bloating   • Medicare annual wellness visit, initial   • Wheelchair bound   • Pneumonia of right lower lobe due to infectious organism   • Atrial flutter   • Non-intractable vomiting with nausea   • Acute kidney injury   • Acute respiratory failure with hypoxia and hypercapnia   • DNR (do not resuscitate)   • Hypernatremia               Adult Rehabilitation Note       17 1401 17 0915 17 1000    Rehab Assessment/Intervention    Discipline physical therapist  -AR physical therapy assistant  -RW physical therapy assistant  -RW    Document Type therapy note (daily note)  -AR therapy note (daily note)  -RW therapy note (daily note)  -RW    Subjective Information agree to therapy   only to exercises, declines  ambulation/standing ex  -AR agree to therapy;no complaints  -RW agree to therapy  -RW    Patient Effort, Rehab Treatment adequate  -AR good  -RW good  -RW    Precautions/Limitations  fall precautions;oxygen therapy device and L/min   4L O2  -RW fall precautions;oxygen therapy device and L/min   6L highflow  -RW    Recorded by [AR] Belkys Mendoza, PT [RW] Yaneth Toscano, PTA [RW] Yaneth Toscano PTA    Vital Signs    Pre SpO2 (%)  92  -RW 95  -RW    O2 Delivery Pre Treatment  supplemental O2   4L  -RW supplemental O2   6L  -RW    Intra SpO2 (%)  84  -RW     O2 Delivery Intra Treatment  supplemental O2   4L  -RW supplemental O2   6L  -RW    Post SpO2 (%)  90  -RW 95  -RW    O2 Delivery Post Treatment  supplemental O2   4L  -RW supplemental O2   6L  -RW    Recorded by  [RW] Yaneth Toscano, GWEN [RW] Yaneth Toscano PTA    Pain Assessment    Pain Assessment No/denies pain  -AR No/denies pain  -RW No/denies pain  -RW    Recorded by [AR] Belkys Mendoza, PT [RW] Yaneth Toscano, PTA [RW] Yaneth Toscano PTA    Cognitive Assessment/Intervention    Current Cognitive/Communication Assessment functional  -AR functional  -RW functional  -RW    Orientation Status oriented x 4  -AR oriented x 4  -RW oriented x 4  -RW    Follows Commands/Answers Questions  100% of the time  -% of the time;needs cueing  -RW    Personal Safety  WNL/WFL  -RW mild impairment;at risk behaviors demonstrated  -RW    Personal Safety Interventions  fall prevention program maintained;gait belt;nonskid shoes/slippers when out of bed  -RW fall prevention program maintained;gait belt;nonskid shoes/slippers when out of bed  -RW    Recorded by [AR] Belkys Mendoza, PT [RW] Yaneth Toscano, PTA [RW] Yaneth Toscano PTA    Bed Mobility, Assessment/Treatment    Bed Mobility, Assistive Device  bed rails;head of bed elevated  -RW     Bed Mob, Supine to Sit, [place] not tested  -AR supervision required  -RW not tested  -RW    Bed Mob, Sit to Supine,  [place] not tested  -AR not tested   Pt up in chair  -RW not tested  -RW    Bed Mobility, Comment   Pt up in chair  -RW    Recorded by [AR] Belkys Mendoza, PT [RW] Yaneth Toscano, GWEN [RW] Yaneth Toscano PTA    Transfer Assessment/Treatment    Transfers, Sit-Stand [place] not tested  -AR contact guard assist;verbal cues required  -RW minimum assist (75% patient effort);verbal cues required  -RW    Transfers, Stand-Sit [place] not tested  -AR contact guard assist;verbal cues required  -RW minimum assist (75% patient effort);verbal cues required  -RW    Transfers, Sit-Stand-Sit, Assist Device  rolling walker   rollator  -RW rolling walker  -RW    Transfer, Comment  Pt stood x2  -RW pt stood x3  -RW    Recorded by [AR] Belkys Mendoza, PT [RW] Yaneth Toscano PTA [RW] Yaneth Toscano PTA    Gait Assessment/Treatment    Gait, [place] Level  contact guard assist;verbal cues required  -RW contact guard assist;minimum assist (75% patient effort);verbal cues required  -RW    Gait, Assistive Device  rollator  -RW rollator  -RW    Gait, Distance (Feet)  50   x2 w/ seated rest break  -RW 95   w/ 2 seated rest breaks  -RW    Gait, Gait Pattern Analysis  swing-through gait  -RW swing-through gait  -RW    Gait, Gait Deviations  forward flexed posture;bilateral:;[place] decreased;narrow base;step length decreased;stride length decreased  -RW forward flexed posture;bilateral:;[place] decreased;narrow base;step length decreased  -RW    Gait, Safety Issues  step length decreased;supplemental O2   4L O2  -RW balance decreased during turns;step length decreased;supplemental O2   6L high flow  -RW    Gait, Impairments  strength decreased  -RW strength decreased;impaired balance  -RW    Gait, Comment  1 seated rest break due to fatigue and dyspnea  -RW 2 seated rest breaks due to fatigue. Rest breaks after 35'  -RW    Recorded by  [RW] Yaneth Toscano PTA [RW] Yaneth Toscano PTA    Therapy Exercises     Bilateral Lower Extremities ankle pumps/circles;LAQ;hip flexion;hip abduction/adduction;10 reps   declined doing more than 10 reps  -AR      Recorded by [AR] Belkys Mendoza PT      Positioning and Restraints    Pre-Treatment Position sitting in chair/recliner  -AR in bed  -RW sitting in chair/recliner  -RW    Post Treatment Position chair  -AR chair  -RW chair  -RW    In Chair sitting;call light within reach;encouraged to call for assist;exit alarm on  -AR sitting;call light within reach;encouraged to call for assist;exit alarm on  -RW sitting;call light within reach;encouraged to call for assist;exit alarm on  -RW    Recorded by [AR] Belkys Mendoza, PT [RW] Yaneth Toscano, PTA [RW] Yaneth Toscano PTA      User Key  (r) = Recorded By, (t) = Taken By, (c) = Cosigned By    Initials Name Effective Dates    AR Belkys Mendoza, PT 06/27/16 -     RW Yaneth Toscano PTA 04/06/16 -                 IP PT Goals       11/03/17 1404 10/25/17 1359       Bed Mobility PT LTG    Bed Mobility PT LTG, Time to Achieve  1 wk  -EF     Bed Mobility PT LTG, Activity Type  all bed mobility  -EF     Bed Mobility PT LTG, [place] Level  conditional independence;supervision required  -EF     Bed Mobility PT LTG, Date Goal Reviewed 11/03/17  -AR      Bed Mobility PT LTG, Outcome goal ongoing  -AR      Transfer Training PT LTG    Transfer Training PT LTG, Time to Achieve  1 wk  -EF     Transfer Training PT LTG, Activity Type  all transfers  -EF     Transfer Training PT LTG, [place] Level  contact guard assist  -EF     Transfer Training PT LTG, Assist Device  walker, rolling  -EF     Transfer Training PT  LTG, Date Goal Reviewed 11/03/17  -AR      Transfer Training PT LTG, Outcome goal ongoing  -AR      Gait Training PT LTG    Gait Training Goal PT LTG, Time to Achieve  1 wk  -EF     Gait Training Goal PT LTG, [place] Level  contact guard assist  -EF     Gait Training Goal PT LTG, Assist Device  walker, rolling  -EF     Gait  Training Goal PT LTG, Distance to Achieve 100  -AR 40  -EF     Gait Training Goal PT LTG, Date Goal Reviewed 11/03/17  -AR      Gait Training Goal PT LTG, Outcome goal revised  -AR        User Key  (r) = Recorded By, (t) = Taken By, (c) = Cosigned By    Initials Name Provider Type    EF Bhargavi Briceno, PT Physical Therapist    AR Belkys Mendoza, PT Physical Therapist          Physical Therapy Education     Title: PT OT SLP Therapies (Active)     Topic: Physical Therapy (Active)     Point: Mobility training (Active)    Learning Progress Summary    Learner Readiness Method Response Comment Documented by Status   Patient Acceptance E NR  AR 11/03/17 1402 Active    Acceptance E VU   11/02/17 1403 Done    Acceptance E,TB,D VU,[facility] 11/02/17 0928 Done    Acceptance E,TB,D VU,[facility] 11/01/17 1035 Done    Acceptance E,TB,D VU,[facility] 10/31/17 1103 Done    Acceptance E VU   10/27/17 1755 Done    Acceptance E VU   10/27/17 1243 Done    Acceptance E,TB,D VU,[facility] 10/26/17 1210 Done    Acceptance E NR   10/25/17 1359 Active               Point: Home exercise program (Active)    Learning Progress Summary    Learner Readiness Method Response Comment Documented by Status   Patient Acceptance E NR  AR 11/03/17 1402 Active    Acceptance E VU   11/02/17 1403 Done    Acceptance E NR   10/25/17 1359 Active               Point: Body mechanics (Active)    Learning Progress Summary    Learner Readiness Method Response Comment Documented by Status   Patient Acceptance E NR  AR 11/03/17 1402 Active    Acceptance E VU   11/02/17 1403 Done    Acceptance E,TB,D VU,[facility] 11/02/17 0928 Done    Acceptance E,TB,D VU,[facility] 11/01/17 1035 Done    Acceptance E,TB,D VU,[facility] 10/31/17 1103 Done    Acceptance E VU   10/27/17 1243 Done    Acceptance E,TB,D VU,[facility] 10/26/17 1210 Done    Acceptance E NR   10/25/17 1359 Active               Point: Precautions (Active)    Learning Progress Summary    Learner Readiness Method  Response Comment Documented by Status   Patient Acceptance E NR  AR 11/03/17 1402 Active    Acceptance E VU  NR 11/02/17 1403 Done    Acceptance E,TB,D VU,NR  RW 11/02/17 0928 Done    Acceptance E,TB,D VU,NR  RW 11/01/17 1035 Done    Acceptance E,TB,D VU,NR  RW 10/31/17 1103 Done    Acceptance E VU  CC 10/27/17 1755 Done    Acceptance E VU  RW 10/27/17 1243 Done    Acceptance E,TB,D VU,NR  RW 10/26/17 1210 Done    Acceptance E NR  EF 10/25/17 1359 Active                      User Key     Initials Effective Dates Name Provider Type Discipline    EF 04/24/15 -  Bhargavi Briceno, PT Physical Therapist PT    NR 04/13/15 -  Delma Guerra MA,CCC-SLP Speech and Language Pathologist SLP     03/03/17 -  Milagro Oneill, RN Registered Nurse Nurse    AR 06/27/16 -  Belkys Mendoza, PT Physical Therapist PT    RW 04/06/16 -  Yaneth Toscano PTA Physical Therapy Assistant PT                    PT Recommendation and Plan  Anticipated Discharge Disposition: skilled nursing facility  PT Frequency: daily  Plan of Care Review  Outcome Summary/Follow up Plan: Pt demonstrates generalized weakness and limitedactivity tolerance during PT today.  Performed exercises w/ education for HEP; declined further activity or more reps.            Outcome Measures       11/03/17 1400 11/02/17 0900 11/01/17 1000    How much help from another person do you currently need...    Turning from your back to your side while in flat bed without using bedrails? 3  -AR 3  -RW 3  -RW    Moving from lying on back to sitting on the side of a flat bed without bedrails? 3  -AR 3  -RW 3  -RW    Moving to and from a bed to a chair (including a wheelchair)? 3  -AR 3  -RW 3  -RW    Standing up from a chair using your arms (e.g., wheelchair, bedside chair)? 3  -AR 3  -RW 3  -RW    Climbing 3-5 steps with a railing? 1  -AR 1  -RW 1  -RW    To walk in hospital room? 3  -AR 3  -RW 3  -RW    AM-PAC 6 Clicks Score 16  -AR 16  -RW 16  -RW    Functional Assessment     Outcome Measure Options  AM-PAC 6 Clicks Basic Mobility (PT)  -RW AM-PAC 6 Clicks Basic Mobility (PT)  -RW      User Key  (r) = Recorded By, (t) = Taken By, (c) = Cosigned By    Initials Name Provider Type    HERNANDO Mendoza PT Physical Therapist    RW Yaneth Toscano, PTA Physical Therapy Assistant           Time Calculation:         PT Charges       11/03/17 1403          Time Calculation    Start Time 1352  -AR      Stop Time 1403  -AR      Time Calculation (min) 11 min  -AR      PT Received On 11/03/17  -AR      PT - Next Appointment 11/04/17  -AR      PT Goal Re-Cert Due Date 11/10/17  -AR        User Key  (r) = Recorded By, (t) = Taken By, (c) = Cosigned By    Initials Name Provider Type    HERNANDO Mendoza PT Physical Therapist          Therapy Charges for Today     Code Description Service Date Service Provider Modifiers Qty    24184669486 HC PT THER PROC EA 15 MIN 11/3/2017 Belkys Mendoza, PT GP 1    83686808931 HC PT THER SUPP EA 15 MIN 11/3/2017 Belkys Mendoza PT GP 1          PT G-Codes  Outcome Measure Options: AM-PAC 6 Clicks Basic Mobility (PT)    Belkys Mendoza PT  11/3/2017

## 2017-11-03 NOTE — PROGRESS NOTES
Dr. LYNNE Corona    [address]    11/3/2017    Patient ID:  Name:  Pooja Duque  MRN:  [id number]  11/27/1931  85 y.o.  female            CC/Reason for visit: Follow-up for acute on chronic respiratory failure, COPD, pleural effusion    HPI: The patient is telling me that she feels a little bit better today.  She is still very short of breath with any minimal exertion.  She wants to keep her oxygen at 4 L at all times.  I explained to the patient that she does not need 4 L of oxygen flow if her oxygen saturation is 95 or 96%.  Her goal saturation should be between 88 and 94%.    Vitals:  Vitals:    11/03/17 0931 11/03/17 1225 11/03/17 1322 11/03/17 1423   BP: 128/54   125/65   BP Location:    Right arm   Patient Position:    Sitting   Pulse: 94 68  95   Resp:    16   Temp:    98 °F (36.7 °C)   TempSrc:    Oral   SpO2:   91% 94%   Weight:       Height:               Body mass index is 38.58 kg/(m^2).    Intake/Output Summary (Last 24 hours) at 11/03/17 1553  Last data filed at 11/02/17 1828   Gross per 24 hour   Intake              210 ml   Output                0 ml   Net              210 ml       Exam:  GEN:  No distress,Awake, alert  LUNGS: Diminished and distant breath sounds bilaterally, but no wheezing, nonlabored breathing  CV:  Normal S1S2, without murmur, 1+ edema both lower extremities  ABD:  Obese, nontender    Scheduled meds:    arformoterol 15 mcg Nebulization BID - RT   aspirin EC 81 mg Oral Daily   budesonide 0.5 mg Nebulization BID - RT   busPIRone 7.5 mg Oral BID   calcium-vitamin D 1,000 mg Oral Q12H   digoxin 125 mcg Oral Daily   fluticasone 2 spray Each Nare Daily   metoprolol succinate  mg Oral Daily   nystatin  Topical Q12H   pantoprazole 40 mg Oral Q AM   predniSONE 20 mg Oral Daily With Breakfast     IV meds:                           Data Review:   I reviewed the patient's medications and new clinical results.  Lab Results   Component Value Date    CALCIUM 9.4  11/03/2017    MG 2.2 10/24/2017    MG 2.1 10/15/2017    MG 1.9 03/01/2016       Results from last 7 days  Lab Units 11/03/17  0647 11/02/17  1415 11/02/17  1154 11/02/17  0616  10/31/17  1148  10/29/17  0737   SODIUM mmol/L 146* 144  --  149*  < > 149*  < > 148*   POTASSIUM mmol/L 3.6  --   --  3.6  --  3.8  < > 3.6   CHLORIDE mmol/L 98  --   --  98  --  93*  < > 99   CO2 mmol/L 43.0*  --   --  41.4*  --  44.5*  < > 40.5*   BUN mg/dL 35*  --   --  34*  --  39*  < > 38*   CREATININE mg/dL 0.68  --   --  0.75  --  0.88  < > 0.97   CALCIUM mg/dL 9.4  --   --  8.8  --  8.8  < > 8.8   GLUCOSE mg/dL 109*  --   --  104*  --  250*  < > 198*   WBC 10*3/mm3 7.29  --   --  8.80  --   --   --  8.20   HEMOGLOBIN g/dL 11.0*  --   --  11.3*  --   --   --  10.6*   PLATELETS 10*3/mm3 140  --   --  167  --   --   --  166   INR   --   --  0.98  --   --   --   --   --    PROBNP pg/mL  --   --   --   --   --   --   --  5547.0*   < > = values in this interval not displayed.            ASSESSMENT:   Principal Problem:    Acute respiratory failure with hypoxia and hypercapnia  Active Problems:    Acute on chronic diastolic heart failure    Acute exacerbation of chronic obstructive airways disease    Hypertension    Chronic low back pain    On home O2    DNR (do not resuscitate)    Hypernatremia      PLAN:  I will order ultrasound-guided thoracentesis on the right side.  I believe that may help this patient's dyspnea somewhat.  Continue supportive care with bronchodilators and oxygen.  I'm not sure why she is still on prednisone.  But at this point, and prednisone hasn't helped, it will not help.  We need to stop it unless she has been taking chronically at home.      Alejo Corona MD  11/3/2017

## 2017-11-03 NOTE — PLAN OF CARE
Problem: Patient Care Overview (Adult)  Goal: Plan of Care Review  Outcome: Ongoing (interventions implemented as appropriate)    11/03/17 1703   Coping/Psychosocial Response Interventions   Plan Of Care Reviewed With patient   Outcome Evaluation   Outcome Summary/Follow up Plan pt denies pain, oxygen on 4 liters, currently getting paracentesis, VSS, afib on monitor.       Goal: Adult Individualization and Mutuality  Outcome: Ongoing (interventions implemented as appropriate)  Goal: Discharge Needs Assessment  Outcome: Ongoing (interventions implemented as appropriate)    Problem: Fall Risk (Adult)  Goal: Absence of Falls  Outcome: Ongoing (interventions implemented as appropriate)    Problem: Skin Integrity Impairment, Risk/Actual (Adult)  Goal: Skin Integrity/Wound Healing  Outcome: Ongoing (interventions implemented as appropriate)    Problem: Pain, Acute (Adult)  Goal: Acceptable Pain Control/Comfort Level  Outcome: Ongoing (interventions implemented as appropriate)    Problem: Respiratory Insufficiency (Adult)  Goal: Acid/Base Balance  Outcome: Ongoing (interventions implemented as appropriate)  Goal: Effective Ventilation  Outcome: Ongoing (interventions implemented as appropriate)

## 2017-11-03 NOTE — PLAN OF CARE
Problem: Inpatient Physical Therapy  Goal: Bed Mobility Goal LTG- PT    10/25/17 1359 11/03/17 1404   Bed Mobility PT LTG   Bed Mobility PT LTG, Time to Achieve 1 wk --    Bed Mobility PT LTG, Activity Type all bed mobility --    Bed Mobility PT LTG, [place] Level conditional independence;supervision required --    Bed Mobility PT LTG, Date Goal Reviewed --  11/03/17   Bed Mobility PT LTG, Outcome --  goal ongoing       Goal: Transfer Training Goal 1 LTG- PT    10/25/17 1359 11/03/17 1404   Transfer Training PT LTG   Transfer Training PT LTG, Time to Achieve 1 wk --    Transfer Training PT LTG, Activity Type all transfers --    Transfer Training PT LTG, [place] Level contact guard assist --    Transfer Training PT LTG, Assist Device walker, rolling --    Transfer Training PT LTG, Date Goal Reviewed --  11/03/17   Transfer Training PT LTG, Outcome --  goal ongoing       Goal: Gait Training Goal LTG- PT    10/25/17 1359 11/03/17 1404   Gait Training PT LTG   Gait Training Goal PT LTG, Time to Achieve 1 wk --    Gait Training Goal PT LTG, [place] Level contact guard assist --    Gait Training Goal PT LTG, Assist Device walker, rolling --    Gait Training Goal PT LTG, Distance to Achieve --  100   Gait Training Goal PT LTG, Date Goal Reviewed --  11/03/17   Gait Training Goal PT LTG, Outcome --  goal revised

## 2017-11-04 NOTE — PROGRESS NOTES
LOS: 13 days   Patient Care Team:  Justin Jordan MD as PCP - General  Justin Jordan MD as PCP - Family Medicine  KYM Ventura as PCP - Claims Attributed  Dean Hermosillo RN as Care Coordinator (Population Health)    Subjective     Patient has a lot of complaints she apparently did walk in the tai once today.  Breathing not too bad she is on her home 3 L nasal cannula O2    Review of Systems:          Objective     Vital Signs  Vital Sign Min/Max for last 24 hours  Temp  Min: 95.8 °F (35.4 °C)  Max: 97.9 °F (36.6 °C)   BP  Min: 119/62  Max: 127/65   Pulse  Min: 79  Max: 87   Resp  Min: 18  Max: 20   SpO2  Min: 91 %  Max: 96 %   Flow (L/min)  Min: 3  Max: 4   Weight  Min: 206 lb 1.6 oz (93.5 kg)  Max: 206 lb 1.6 oz (93.5 kg)        Ventilator/Non-Invasive Ventilation Settings     Start     Ordered    10/23/17 0948  . AutoBIPAP; Max IPAP: 16; Max EPAP: 8; Initial LPM: 6; Titrate for SPO2: 90%  Until Discontinued,   Status:  Canceled     Question Answer Comment   . AutoBIPAP    Max IPAP 16    Max EPAP 8    Initial LPM 6    Titrate for SPO2 90%        10/23/17 0947    10/23/17 0938  . AutoBIPAP; Max IPAP: 8; Max EPAP: 16; Initial LPM: 6; Titrate for SPO2: 90%  Until Discontinued,   Status:  Canceled     Question Answer Comment   . AutoBIPAP    Max IPAP 8    Max EPAP 16    Initial LPM 6    Titrate for SPO2 90%        10/23/17 0939                       Body mass index is 38.96 kg/(m^2).  I/O last 3 completed shifts:  In: -   Out: 500 [Urine:500]  I/O this shift:  In: 472 [P.O.:472]  Out: -         Physical Exam:  General Appearance: Well-developed obese white female sitting up in a chair on 3 L nasal cannula O2 eating dinner in no acute distress  Eyes: Conjunctiva clear and anicteric  ENT: His membranes are moist Mallampati 1 airway  Neck: Trachea midline no adenopathy  Lungs: Very minimal crackles in the lung bases nonlabored symmetric expansion  Cardiac: Regular rate and rhythm  Abdomen:  Obese soft no palpable organomegaly  : Not examined  Musc/Skel: Grossly normal  Skin: No jaundice no petechiae no rashes noted  Neuro: He is alert oriented cooperative  Extremities/P Vascular: She has 1+ pitting pretibial edema bilaterally palpable dorsalis pedis and radial pulses bilaterally  MSE: Not very happy today       Labs:    Results from last 7 days  Lab Units 11/04/17  0425 11/03/17  0647 11/02/17  1415 11/02/17  0616 11/01/17  1059 10/31/17  1424 10/31/17  1148 10/31/17  0600 10/30/17  0724 10/29/17  0737   GLUCOSE mg/dL 147* 109*  --  104*  --   --  250* 162* 226* 198*   SODIUM mmol/L 146* 146* 144 149* 149* 147* 149* 154* 150* 148*   POTASSIUM mmol/L 3.6 3.6  --  3.6  --   --  3.8 4.4  4.1 2.9* 3.6   CO2 mmol/L 40.7* 43.0*  --  41.4*  --   --  44.5* 47.5* 45.2* 40.5*   CHLORIDE mmol/L 98 98  --  98  --   --  93* 99 93* 99   ANION GAP mmol/L 7.3 5.0  --  9.6  --   --  11.5 7.5 11.8 8.5   CREATININE mg/dL 0.91 0.68  --  0.75  --   --  0.88 0.78 0.80 0.97   BUN mg/dL 38* 35*  --  34*  --   --  39* 39* 36* 38*   BUN / CREAT RATIO  41.8* 51.5*  --  45.3*  --   --  44.3* 50.0* 45.0* 39.2*   CALCIUM mg/dL 10.1 9.4  --  8.8  --   --  8.8 8.6 8.2* 8.8   EGFR IF NONAFRICN AM mL/min/1.73 59* 82  --  73  --   --  61 70 68 55*     Estimated Creatinine Clearance: 47.2 mL/min (by C-G formula based on Cr of 0.91).        Results from last 7 days  Lab Units 11/03/17  0647 11/02/17  0616 10/29/17  0737   WBC 10*3/mm3 7.29 8.80 8.20   RBC 10*6/mm3 3.83* 4.01 3.74*   HEMOGLOBIN g/dL 11.0* 11.3* 10.6*   HEMATOCRIT % 38.1 39.9 37.6   MCV fL 99.5* 99.5* 100.5*   MCH pg 28.7 28.2 28.3   MCHC g/dL 28.9* 28.3* 28.2*   RDW % 17.7* 17.8* 17.5*   RDW-SD fl 64.1* 65.0* 64.1*   MPV fL 11.5 11.1 11.3   PLATELETS 10*3/mm3 140 167 166   NEUTROPHIL % % 75.9 76.9* 92.1*   LYMPHOCYTE % % 15.9* 14.2* 4.3*   MONOCYTES % % 5.5 6.9 2.9*   EOSINOPHIL % % 1.8 1.1 0.0*   BASOPHIL % % 0.1 0.1 0.0   IMM GRAN % % 0.8* 0.8* 0.7*   NEUTROS ABS  10*3/mm3 5.53 6.76 7.55   LYMPHS ABS 10*3/mm3 1.16 1.25 0.35*   MONOS ABS 10*3/mm3 0.40 0.61 0.24   EOS ABS 10*3/mm3 0.13 0.10 0.00   BASOS ABS 10*3/mm3 0.01 0.01 0.00   IMMATURE GRANS (ABS) 10*3/mm3 0.06* 0.07* 0.06*               Results from last 7 days  Lab Units 10/29/17  0737   PROBNP pg/mL 5547.0*               Results from last 7 days  Lab Units 11/02/17  1154   INR  0.98     Microbiology Results (last 10 days)     ** No results found for the last 240 hours. **                arformoterol 15 mcg Nebulization BID - RT   aspirin EC 81 mg Oral Daily   budesonide 0.5 mg Nebulization BID - RT   busPIRone 7.5 mg Oral BID   calcium-vitamin D 1,000 mg Oral Q12H   digoxin 125 mcg Oral Daily   fluticasone 2 spray Each Nare Daily   metoprolol succinate  mg Oral Daily   nystatin  Topical Q12H   pantoprazole 40 mg Oral Q AM          Diagnostics:  Xr Chest 2 View    Result Date: 10/13/2017  PA AND LATERAL CHEST X-RAY  HISTORY: SOA  triage protocol  COMPARISON: 05/02/2016.  FINDINGS: PA and lateral views of the chest were obtained. There is severe kyphosis. The lungs are hyperexpanded suggesting COPD. There is underlying emphysema. There is an opacity in the right lower lobe which may be related to atelectasis or pneumonia. There is pulmonary vascular congestion without overt edema/CHF. The heart is enlarged. Small effusions are present posteriorly. There is generalized osteopenia. There are several compression deformities in the thoracolumbar spine one of which is status post vertebroplasty. They are stable compared to previous (and most likely osteoporotic in nature considering marked regional osteopenia.)      1. Emphysema/COPD with increasing right lower lobe opacity which could be related to an area of pneumonia. 2. Pulmonary vascular congestion with small effusions  This report was finalized on 10/13/2017 10:35 PM by Sukhwinder Shay MD.      Ct Angiogram Chest With & Without Contrast    Result Date:  10/23/2017  CT ANGIOGRAM OF THE CHEST. MULTIPLE CORONAL, SAGITTAL, AND 3-D RECONSTRUCTIONS.  HISTORY: 85-year-old female with acute respiratory failure.  TECHNIQUE: Radiation dose reduction techniques were utilized, including automated exposure control and exposure modulation based on body size. CT angiogram of the chest was performed. Multiple coronal, sagittal, and 3-D reconstruction images were obtained. Compared with chest radiograph performed earlier today as well as noncontrasted chest CT from 02/26/2016.  FINDINGS: There is adequate opacification of the pulmonary arteries and branches. There are no filling defects or evidence for pulmonary thromboemboli. The pulmonary arteries are enlarged measuring 3.3 cm transversely, previously up to 3.0 cm. There is bronchial thickening of both the lower lobes and there are airspace consolidations at the dependent aspect of both lower lobes. Small right and tiny left pleural effusions are present. There is no pericardial effusion. There are moderately extensive underlying emphysematous changes. There is no lymphadenopathy within the chest.      1. There is no evidence for pulmonary thromboemboli. 2. The airspace consolidations at both lower lobes are suspicious for pneumonia. Small right and tiny left pleural effusions. 3. Moderately extensive emphysema. Enlarged pulmonary arteries suggest pulmonary arterial hypertension.  This report was finalized on 10/23/2017 11:52 AM by Dr. Neema Coppola MD.      Xr Chest 1 View    Result Date: 10/23/2017  PORTAL CHEST SINGLE VIEW 08:45  HISTORY: 85-year-old morbidly obese female with decreased O2 saturation  COMPARISON: 10/22/2017  FINDINGS: 1. Limited imaging due to body habitus portable technique and low lung volumes. 2. Question mild bibasilar atelectasis and pleural fluid, no interval change. 3. Cardiac enlargement and underlying chronic lung disease. 4. Follow-up with lateral view or CT chest would be helpful for more accurate  assessment  This report was finalized on 10/23/2017 9:45 AM by Dr. Anthony Ware MD.      Xr Chest 1 View    Result Date: 10/22/2017  PORTAL CHEST SINGLE VIEW  HISTORY: Morbidly obese 85-year-old female with history of hypertension, COPD, congestive failure, and London's esophagus presents for evaluation of shortness of breath.  COMPARISON: 10/16/2017  FINDINGS: 1. Limited imaging due to body habitus portable technique and low lung volumes. 2. Persistent opacification right base suggesting atelectasis and effusion. 3. Prominent cardiomegaly. 4. Underlying chronic pulmonary change. 5. Suspect hydrostatic edema, can't rule out underlying pneumonia. 6. Follow-up PA and lateral would be helpful in patient's condition permits, alternatively CT may be useful.  This report was finalized on 10/22/2017 5:03 PM by Dr. Anthony Ware MD.      Xr Chest Pa & Lateral    Result Date: 10/27/2017  PA AND LATERAL CHEST 10/27/2017  HISTORY: Pneumonia.  FINDINGS: Heart size is mild to moderately enlarged. There is bilateral vascular congestion. There may be some atelectasis or mild pneumonia in the right lung base and small right pleural effusion. Vertebroplasty of a mid to lower thoracic vertebra is seen. Compression deformities of 2 or 3 additional lower thoracic vertebrae are seen.      1. Cardiomegaly and mild vascular congestion. 2. Right lower lobe atelectasis and/or pneumonia and small right pleural effusion.  This report was finalized on 10/27/2017 3:04 PM by Dr. Fran Norman MD.      Xr Chest Pa & Lateral    Result Date: 10/16/2017  TWO-VIEW CHEST  HISTORY: COPD. Possible pneumonia and shortness of breath.  The patient is somewhat rotated to the right and there is worsening of consolidation and pleural fluid at the right base when compared to the study of 3 days ago and most consistent with worsening pneumonia and some associated pleural effusion. The heart remains enlarged and there is also mild vascular congestion that  is unchanged.  This report was finalized on 10/16/2017 6:41 PM by Dr. Dustin Trinh MD.      Fl Video Swallow    Result Date: 10/31/2017  VIDEO ESOPHAGRAM  HISTORY: Dysphagia.  FINDINGS: The patient exhibits mild oropharyngeal dysphagia with an episode of aspiration of thin liquids after the swallow. There is also some deep penetration with juice wash following mechanical soft. Please also see the speech therapist's report. The fluoroscopy time measures 2 minutes and 14 seconds.  This report was finalized on 10/31/2017 12:52 PM by Dr. Dustin Trinh MD.      Us Thoracentesis Right    Result Date: 11/3/2017  ULTRASOUND OF RIGHT CHEST FOR THORACENTESIS  FINDINGS: The ultrasound evaluation shows only a small right pleural effusion. No diagnostic orders were submitted and the size of the effusion is considered too small for therapeutic purposes.  This report was finalized on 11/3/2017 7:15 PM by Dr. Dustin Trinh MD.      Results for orders placed during the hospital encounter of 10/14/17   Adult Transthoracic Echo Complete W/ Cont if Necessary Per Protocol    Narrative · Left ventricular systolic function is normal. Calculated EF = 57.6%.   Estimated EF was in agreement with the calculated EF. Normal left   ventricular cavity size and wall thickness noted. All left ventricular   wall segments contract normally. Left ventricular diastolic was unable to   be assessed.  · Normal right ventricular systolic function noted. Right ventricular   cavity is mild-to-moderately dilated.  · Moderate to severe tricuspid valve regurgitation is present. Estimated   right ventricular systolic pressure from tricuspid regurgitation is mildly   elevated (35-45 mmHg).  · Left atrial cavity size is mild-to-moderately dilated. Saline test   results are negative.          I have reviewed her x-rays and CT scans    Active Hospital Problems (** Indicates Principal Problem)    Diagnosis Date Noted   • **Acute respiratory failure with hypoxia  and hypercapnia [J96.01, J96.02] 10/23/2017   • Hypernatremia [E87.0] 10/26/2017   • DNR (do not resuscitate) [Z66] 10/24/2017   • Hypertension [I10] 02/01/2016   • On home O2 [Z99.81] 02/01/2016   • Chronic low back pain [M54.5, G89.29] 02/01/2016   • Acute exacerbation of chronic obstructive airways disease [J44.1] 02/01/2016   • Acute on chronic diastolic heart failure [I50.33] 02/01/2016      Resolved Hospital Problems    Diagnosis Date Noted Date Resolved   No resolved problems to display.         Assessment/Plan     1. Acute on chronic hypoxic and hypercapnic respiratory failure improved she is back to her baseline 3 L O2  2. Acute exacerbation COPD better continue bronchodilators on steroids  3. Acute on chronic diastolic CHF better  4. Chronic back pain  5. Hypertension  6. Hypernatremia  7. Pleural effusion effusion very tiny to small to tap has not enlarged any.  8. Obesity complicates many of her problems    Plan for disposition:    Calos Nichols MD  11/04/17  5:11 PM    Time:

## 2017-11-04 NOTE — THERAPY RE-EVALUATION
Acute Care - Speech Language Pathology   Swallow Re-Evaluation [facility]     Patient Name: Pooja Duque  : 1931  MRN: [id number]  Today's Date: 2017  Onset of Illness/Injury or Date of Surgery Date: 10/22/17            Admit Date: 10/22/2017    Pt seen for swallow re-eval for possible diet upgrade. No overt s/s of aspiration noted with thins, puree, mech soft, or regular solids; however, pt has hx of dysphagia and recent VFSS (10/30) showed aspiration of thin liquid residue. REC: regular diet with NTL, repeat VFSS  for possible diet upgrade (or as outpt, should pt be d/c'd prior to ). Pt is okay for ice and water between meals after oral care with safe swallow precautions.     Visit Dx:     ICD-10-CM ICD-9-CM   1. Acute systolic congestive heart failure I50.21 428.21     428.0   2. Difficulty walking R26.2 719.7     Patient Active Problem List   Diagnosis   • Abnormal weight gain   • Acute upper respiratory infection   • London esophagus   • Benign colonic polyp   • Atherosclerosis of coronary artery   • COPD, severe   • Chest pressure   • Acute on chronic diastolic heart failure   • CN (constipation)   • Acute exacerbation of chronic obstructive airways disease   • Bleeding from the nose   • Bloodgood disease   • Hypertension   • Hypoxia   • Eczema intertrigo   • Leg cramp   • Chronic low back pain   • OP (osteoporosis)   • Left leg pain   • Basal cell papilloma   • Superficial thrombophlebitis of leg   • On home O2   • Intractable vomiting with nausea   • Acute on chronic respiratory failure with hypoxia   • Hospital discharge follow-up   • Bilateral leg edema   • Anemia   • Abdominal bloating   • Medicare annual wellness visit, initial   • Wheelchair bound   • Pneumonia of right lower lobe due to infectious organism   • Atrial flutter   • Non-intractable vomiting with nausea   • Acute kidney injury   • Acute respiratory failure with hypoxia and hypercapnia   • DNR (do not  resuscitate)   • Hypernatremia     Past Medical History:   Diagnosis Date   • Anemia    • Atherosclerosis of coronary artery 2/1/2016   • London esophagus 2/1/2016   • Cancer    • Candidiasis    • CHF (congestive heart failure)    • Chronic diastolic heart failure 2/1/2016   • Colon polyp    • COPD (chronic obstructive pulmonary disease)    • COPD, severe 2/1/2016   • FLOYD (dyspnea on exertion)    • Eczema intertrigo 2/1/2016   • Esophageal reflux    • Fracture, carpal bone    • Hiatal hernia    • Hypertension    • Low back pain    • Macular degeneration    • On home O2 2/1/2016   • Osteopenia    • Osteoporosis    • PND (post-nasal drip) 2/1/2016   • Pneumonia    • Superficial thrombophlebitis of leg 2/1/2016     Past Surgical History:   Procedure Laterality Date   • APPENDECTOMY     • FOOT SURGERY Bilateral    • VERTEBRAL AUGMENTATION  10/02/2015    percutaneous vertebral augmentation kyphoplasty- T8- Dr. Alejandro    • WRIST SURGERY Left           SWALLOW EVALUATION (last 72 hours)      Swallow Evaluation       11/04/17 1300 11/02/17 1245             Rehab Evaluation    Document Type re-evaluation  -LT therapy note (daily note)  -NR       Subjective Information agree to therapy  -LT agree to therapy  -NR       Patient Effort, Rehab Treatment  good  -NR       Symptoms Noted During/After Treatment none  -LT        General Information    Patient Profile Review yes  -LT        Plans/Goals Discussed With patient  -LT        Clinical Impression    Patient's Goals For Discharge return to regular diet  -LT        Rehab Potential/Prognosis, Swallowing good, to achieve stated therapy goals  -LT        Criteria for Skilled Therapeutic Interventions Met skilled criteria for dysphagia intervention met  -LT        Therapy Frequency PRN  -LT        Predicted Duration Therapy Interv (days) until discharge  -LT        Pain Assessment    Pain Assessment No/denies pain  -LT No/denies pain  -NR       Cognitive  Assessment/Intervention    Current Cognitive/Communication Assessment functional  -LT        Oral Motor Structure and Function    Oral Motor Anatomy and Physiology patient demonstrates anatomy that is WNL  -LT        Dentition Assessment present and adequate;other (see comments)   no bottom dentures  -LT        Oral Musculature General Assessment WNL (within normal limits)  -LT        Dysphagia Treatment Objectives and Progress    Dysphagia Treatment Objectives  Improve laryngeal elevation;Improve hyolaryngeal excursion;Improve tongue base & pharyngeal wall squeeze;Other 1  -NR       Improve laryngeal elevation    To improve laryngeal elevation, patient will:  Complete pitch-glide;80%;with inconsistent cues  -NR       Status: Improve laryngeal elevation  New  -NR       Laryngeal Elevation Progress  60%;with inconsistent cues  -NR       Improve hyolaryngeal excursion    To improve hyolaryngeal excursion, patient will:  Complete head lift sustained (comment number of seconds);Complete head lift repetitive (comment number of lifts);80%;with inconsistent cues  -NR       Status: Improve hyolaryngeal excursion  New  -NR       Improve tongue base & pharyngeal wall squeeze    To improve tongue base & pharyngeal wall squeeze, patient will:  Complete gargle/hold retraction;Complete yawn/hold retractions;Complete tongue base retraction;Complete effortful swallow;Complete tongue hold swallow;80%;with inconsistent cues  -NR       Status: Improve tongue base & pharyngeal wall squeeze  New  -NR       Tongue Base/Pharyngeal Wall Squeeze Progress  60%;with inconsistent cues  -NR       Dysphagia Other 1    Dysphagia Other 1 Objective  --   Tolerate diet without overt/clinical s/s of aspiration.  -NR       Status: Dysphagia Other 1  New  -NR       Dysphagia Other 1 Progress  60%;with inconsistent cues  -NR         User Key  (r) = Recorded By, (t) = Taken By, (c) = Cosigned By    Initials Name Effective Dates    HAYDEN Guerra  MA,CCC-SLP 04/13/15 -     LT Annie Elizondo MS CCC-SLP 04/13/15 -         EDUCATION  The patient has been educated in the following areas:   Dysphagia (Swallowing Impairment).    SLP Recommendation and Plan                    Criteria for Skilled Therapeutic Interventions Met: skilled criteria for dysphagia intervention met     Rehab Potential/Prognosis, Swallowing: good, to achieve stated therapy goals  Therapy Frequency: PRN                        IP SLP Goals       11/02/17 1404 10/30/17 1345 10/28/17 1427    Safely Consume Diet    Safely Consume Diet- SLP, Date Established  10/30/17  -AW     Safely Consume Diet- SLP, Time to Achieve by discharge  -NR  by discharge  -MG    Safely Consume Diet- SLP, Activity Level Patient will improve laryngeal elevation for safer, more efficient swallow  -NR      Safely Consume Diet- SLP, Outcome goal ongoing  -NR goal ongoing  -AW       User Key  (r) = Recorded By, (t) = Taken By, (c) = Cosigned By    Initials Name Provider Type    MG Zayda Bourne MA,CCC-SLP Speech and Language Pathologist    NR Delma Guerra MA,[facility]SLP Speech and Language Pathologist    JACQUELYN Newby MS CCC-SLP Speech and Language Pathologist             SLP Outcome Measures (last 72 hours)      SLP Outcome Measures       11/04/17 1300 11/02/17 1245       SLP Outcome Measures    Outcome Measure Used? Adult NOMS  -LT Adult NOMS  -NR     FCM Scores    FCM Chosen Swallowing  -LT Swallowing  -NR     Swallowing FCM Score 5  -LT 4  -NR       User Key  (r) = Recorded By, (t) = Taken By, (c) = Cosigned By    Initials Name Effective Dates    NR Delma Guerra MA,CCC-SLP 04/13/15 -     LT Annie Elizondo MS CCCSLP 04/13/15 -            Time Calculation:         Time Calculation- SLP       11/04/17 1311          Time Calculation- SLP    SLP Received On 11/04/17  -LT        User Key  (r) = Recorded By, (t) = Taken By, (c) = Cosigned By    Initials Name Provider Type    LT Annie Elizondo MS CCC-SLP Speech and Language  Pathologist          Therapy Charges for Today     Code Description Service Date Service Provider Modifiers Qty    [id number] HC ST TREATMENT SWALLOW 3 11/4/2017 Annie Elizondo MS CCC-SLP GN 1          SLP G-Codes  SLP NOMS Used?: Yes  Functional Limitations: Swallowing  Swallow Current Status (): At least 40 percent but less than 60 percent impaired, limited or restricted  Swallow Goal Status (): At least 20 percent but less than 40 percent impaired, limited or restricted  Swallow Discharge Status (): At least 40 percent but less than 60 percent impaired, limited or restricted    Annie Elizondo MS CCC-SLP  11/4/2017

## 2017-11-04 NOTE — PROGRESS NOTES
[facility]     LOS: 13 days     Name: Pooja Duque  Age: 85 y.o.  Sex: female  :  1931  MRN: [id number]         Primary Care Physician: Justin Jordan MD    Diet Regular; Nectar / Syrup Thick; Cardiac, Consistent Carbohydrate, Renal    Subjective   Feels improved.    Objective   Temp:  [95.8 °F (35.4 °C)-97.9 °F (36.6 °C)] 96.2 °F (35.7 °C)  Heart Rate:  [79-87] 86  Resp:  [18-20] 18  BP: (119-127)/(62-68) 119/62  SpO2:  [91 %-96 %] 91 %  on  Flow (L/min):  [3-4] 3;   O2 Device: nasal cannula  Body mass index is 38.96 kg/(m^2).    Physical Exam   Constitutional: She is oriented to person, place, and time. No distress.   Cardiovascular: Normal rate and regular rhythm.    Pulmonary/Chest: Effort normal. No respiratory distress. She has decreased breath sounds. She has no wheezes. She has no rhonchi.   Abdominal: Soft. There is no tenderness.   Musculoskeletal: She exhibits edema (trace).   Neurological: She is alert and oriented to person, place, and time.     Reviewed medications and new clinical results    arformoterol 15 mcg Nebulization BID - RT   aspirin EC 81 mg Oral Daily   budesonide 0.5 mg Nebulization BID - RT   busPIRone 7.5 mg Oral BID   calcium-vitamin D 1,000 mg Oral Q12H   digoxin 125 mcg Oral Daily   fluticasone 2 spray Each Nare Daily   metoprolol succinate  mg Oral Daily   nystatin  Topical Q12H   pantoprazole 40 mg Oral Q AM        Results from last 7 days  Lab Units 17  0647 17  0616 10/29/17  0737   WBC 10*3/mm3 7.29 8.80 8.20   HEMOGLOBIN g/dL 11.0* 11.3* 10.6*   PLATELETS 10*3/mm3 140 167 166       Results from last 7 days  Lab Units 17  0425 17  0647 17  1415 17  0616 17  1059 10/31/17  1424 10/31/17  1148 10/31/17  0600 10/30/17  0724 10/29/17  0737   SODIUM mmol/L 146* 146* 144 149* 149* 147* 149* 154* 150* 148*   POTASSIUM mmol/L 3.6 3.6  --  3.6  --   --  3.8 4.4   4.1 2.9* 3.6   CHLORIDE mmol/L 98 98  --  98  --   --  93* 99 93* 99   CO2 mmol/L 40.7* 43.0*  --  41.4*  --   --  44.5* 47.5* 45.2* 40.5*   BUN mg/dL 38* 35*  --  34*  --   --  39* 39* 36* 38*   CREATININE mg/dL 0.91 0.68  --  0.75  --   --  0.88 0.78 0.80 0.97   CALCIUM mg/dL 10.1 9.4  --  8.8  --   --  8.8 8.6 8.2* 8.8   GLUCOSE mg/dL 147* 109*  --  104*  --   --  250* 162* 226* 198*     Estimated Creatinine Clearance: 47.2 mL/min (by C-G formula based on Cr of 0.91).    Intake/Output Summary (Last 24 hours) at 11/04/17 1718  Last data filed at 11/04/17 1310   Gross per 24 hour   Intake              472 ml   Output                0 ml   Net              472 ml     Last 2 weights    11/03/17  0555 11/04/17  0311   Weight: 204 lb 1.6 oz (92.6 kg) 206 lb 1.6 oz (93.5 kg)       Assessment/Plan   Active Hospital Problems (** Indicates Principal Problem)    Diagnosis Date Noted   • **Acute respiratory failure with hypoxia and hypercapnia [J96.01, J96.02] 10/23/2017   • Hypernatremia [E87.0] 10/26/2017   • DNR (do not resuscitate) [Z66] 10/24/2017   • Hypertension [I10] 02/01/2016   • On home O2 [Z99.81] 02/01/2016   • Chronic low back pain [M54.5, G89.29] 02/01/2016   • Acute exacerbation of chronic obstructive airways disease [J44.1] 02/01/2016   • Acute on chronic diastolic heart failure [I50.33] 02/01/2016      Resolved Hospital Problems    Diagnosis Date Noted Date Resolved   No resolved problems to display.     · Discussed with Dr. Corona yesterday, prednisone stopped.  · Pleural effusion too small for therapeutic thoracentesis  · Restart lasix 40 mg daily soon but not today with Na still elevated.  · Previously was at assisted living facility. Home there vs skilled facility soon.    Mikie Callejas MD   11/04/17  5:08 PM

## 2017-11-04 NOTE — PLAN OF CARE
Problem: Patient Care Overview (Adult)  Goal: Plan of Care Review  Outcome: Ongoing (interventions implemented as appropriate)    11/04/17 1642   Coping/Psychosocial Response Interventions   Plan Of Care Reviewed With patient   Patient Care Overview   Progress improving   Outcome Evaluation   Outcome Summary/Follow up Plan No falls. No resp. distress noted but pt reports some SOA with exertion. No c/o pain. Diet changed to regular by [facility] Monday if still here. Cont to monitor.       Goal: Adult Individualization and Mutuality  Outcome: Ongoing (interventions implemented as appropriate)  Goal: Discharge Needs Assessment  Outcome: Ongoing (interventions implemented as appropriate)    Problem: Fall Risk (Adult)  Goal: Absence of Falls  Outcome: Ongoing (interventions implemented as appropriate)    Problem: Skin Integrity Impairment, Risk/Actual (Adult)  Goal: Skin Integrity/Wound Healing  Outcome: Ongoing (interventions implemented as appropriate)    Problem: Pain, Acute (Adult)  Goal: Acceptable Pain Control/Comfort Level  Outcome: Ongoing (interventions implemented as appropriate)    Problem: Respiratory Insufficiency (Adult)  Goal: Acid/Base Balance  Outcome: Ongoing (interventions implemented as appropriate)  Goal: Effective Ventilation  Outcome: Ongoing (interventions implemented as appropriate)

## 2017-11-04 NOTE — THERAPY TREATMENT NOTE
Acute Care - Physical Therapy Treatment Note  [facility]     Patient Name: Pooja Duque  : 1931  MRN: [id number]  Today's Date: 2017  Onset of Illness/Injury or Date of Surgery Date: 10/22/17  Date of Referral to PT: 10/25/17       Admit Date: 10/22/2017    Visit Dx:    ICD-10-CM ICD-9-CM   1. Acute systolic congestive heart failure I50.21 428.21     428.0   2. Difficulty walking R26.2 719.7     Patient Active Problem List   Diagnosis   • Abnormal weight gain   • Acute upper respiratory infection   • [place] esophagus   • Benign colonic polyp   • Atherosclerosis of coronary artery   • COPD, severe   • Chest pressure   • Acute on chronic diastolic heart failure   • CN (constipation)   • Acute exacerbation of chronic obstructive airways disease   • Bleeding from the nose   • Bloodgood disease   • Hypertension   • Hypoxia   • Eczema intertrigo   • Leg cramp   • Chronic low back pain   • OP (osteoporosis)   • Left leg pain   • Basal cell papilloma   • Superficial thrombophlebitis of leg   • On home O2   • Intractable vomiting with nausea   • Acute on chronic respiratory failure with hypoxia   • Hospital discharge follow-up   • Bilateral leg edema   • Anemia   • Abdominal bloating   • Medicare annual wellness visit, initial   • Wheelchair bound   • Pneumonia of right lower lobe due to infectious organism   • Atrial flutter   • Non-intractable vomiting with nausea   • Acute kidney injury   • Acute respiratory failure with hypoxia and hypercapnia   • DNR (do not resuscitate)   • Hypernatremia               Adult Rehabilitation Note       17 1440 17 1401 17 0915    Rehab Assessment/Intervention    Discipline physical therapist  -AR physical therapist  -AR physical therapy assistant  -RW    Document Type therapy note (daily note)  -AR therapy note (daily note)  -AR therapy note (daily note)  -RW    Subjective Information agree to therapy   multiple complaints throughout tx  -AR agree  to therapy   only to exercises, declines ambulation/standing ex  -AR agree to therapy;no complaints  -RW    Patient Effort, Rehab Treatment adequate  -AR adequate  -AR good  -RW    Symptoms Noted During/After Treatment none  -AR      Precautions/Limitations fall precautions;oxygen therapy device and L/min  -AR  fall precautions;oxygen therapy device and L/min   4L O2  -RW    Recorded by [AR] Belkys Mendoza, PT [AR] Belkys Mendoza, PT [RW] Yaneth Toscano PTA    Vital Signs    Pre SpO2 (%)   92  -RW    O2 Delivery Pre Treatment   supplemental O2   4L  -RW    Intra SpO2 (%)   84  -RW    O2 Delivery Intra Treatment   supplemental O2   4L  -RW    Post SpO2 (%)   90  -RW    O2 Delivery Post Treatment   supplemental O2   4L  -RW    Recorded by   [RW] Yaneth Toscano PTA    Pain Assessment    Pain Assessment No/denies pain  -AR No/denies pain  -AR No/denies pain  -RW    Recorded by [AR] Belkys Mendoza, PT [AR] Belkys Mendoza, PT [RW] Yaneth Toscano PTA    Cognitive Assessment/Intervention    Current Cognitive/Communication Assessment functional  -AR functional  -AR functional  -RW    Orientation Status oriented x 4  -AR oriented x 4  -AR oriented x 4  -RW    Follows Commands/Answers Questions   100% of the time  -RW    Personal Safety   WNL/WFL  -RW    Personal Safety Interventions   fall prevention program maintained;gait belt;nonskid shoes/slippers when out of bed  -RW    Recorded by [AR] Belkys Mendoza, PT [AR] Belkys Mendoza, PT [RW] Yaneth Toscano PTA    Bed Mobility, Assessment/Treatment    Bed Mobility, Assistive Device   bed rails;head of bed elevated  -RW    Bed Mob, Supine to Sit, [place] not tested  -AR not tested  -AR supervision required  -RW    Bed Mob, Sit to Supine, [place] not tested  -AR not tested  -AR not tested   Pt up in chair  -RW    Recorded by [AR] Belkys Mendoza, PT [AR] Belkys Mendoza, PT [RW] Yaneth Toscano PTA    Transfer Assessment/Treatment    Transfers, Sit-Stand  [place] minimum assist (75% patient effort);2 person assist required  -AR not tested  -AR contact guard assist;verbal cues required  -RW    Transfers, Stand-Sit [place] minimum assist (75% patient effort)  -AR not tested  -AR contact guard assist;verbal cues required  -RW    Transfers, Sit-Stand-Sit, Assist Device rolling walker  -AR  rolling walker   rollator  -RW    Transfer, Comment   Pt stood x2  -RW    Recorded by [AR] Belkys Mendoza PT [AR] Belkys Mendoza PT [RW] Yaneth Toscano PTA    Gait Assessment/Treatment    Gait, [place] Level minimum assist (75% patient effort);2 person assist required  -AR  contact guard assist;verbal cues required  -RW    Gait, Assistive Device rollator  -AR  rollator  -RW    Gait, Distance (Feet) 80  -AR  50   x2 w/ seated rest break  -RW    Gait, Gait Pattern Analysis   swing-through gait  -RW    Gait, Gait Deviations lynda decreased;decreased heel strike  -AR  forward flexed posture;bilateral:;lynda decreased;narrow base;step length decreased;stride length decreased  -RW    Gait, Safety Issues step length decreased;weight-shifting ability decreased;supplemental O2  -AR  step length decreased;supplemental O2   4L O2  -RW    Gait, Impairments strength decreased;impaired balance  -AR  strength decreased  -RW    Gait, Comment 3 sitting rest break  -AR  1 seated rest break due to fatigue and dyspnea  -RW    Recorded by [AR] Belkys Mendoza PT  [RW] Yaneth Toscano PTA    Therapy Exercises    Bilateral Lower Extremities  ankle pumps/circles;LAQ;hip flexion;hip abduction/adduction;10 reps   declined doing more than 10 reps  -AR     Recorded by  [AR] Belkys Mendoza PT     Positioning and Restraints    Pre-Treatment Position sitting in chair/recliner  -AR sitting in chair/recliner  -AR in bed  -RW    Post Treatment Position chair  -AR chair  -AR chair  -RW    In Chair reclined;sitting;call light within reach;encouraged to call for assist  -AR sitting;call light  within reach;encouraged to call for assist;exit alarm on  -AR sitting;call light within reach;encouraged to call for assist;exit alarm on  -RW    Recorded by [AR] Belkys Mendoza, PT [AR] Belkys Mendoza, PT [RW] Yaneth Toscano, GWEN      User Key  (r) = Recorded By, (t) = Taken By, (c) = Cosigned By    Initials Name Effective Dates    AR Belkys Mendoza, PT 06/27/16 -     RW Yaneth Toscano PTA 04/06/16 -                 IP PT Goals       11/03/17 1404 10/25/17 1359       Bed Mobility PT LTG    Bed Mobility PT LTG, Time to Achieve  1 wk  -EF     Bed Mobility PT LTG, Activity Type  all bed mobility  -EF     Bed Mobility PT LTG, [place] Level  conditional independence;supervision required  -EF     Bed Mobility PT LTG, Date Goal Reviewed 11/03/17  -AR      Bed Mobility PT LTG, Outcome goal ongoing  -AR      Transfer Training PT LTG    Transfer Training PT LTG, Time to Achieve  1 wk  -EF     Transfer Training PT LTG, Activity Type  all transfers  -EF     Transfer Training PT LTG, [place] Level  contact guard assist  -EF     Transfer Training PT LTG, Assist Device  walker, rolling  -EF     Transfer Training PT  LTG, Date Goal Reviewed 11/03/17  -AR      Transfer Training PT LTG, Outcome goal ongoing  -AR      Gait Training PT LTG    Gait Training Goal PT LTG, Time to Achieve  1 wk  -EF     Gait Training Goal PT LTG, [place] Level  contact guard assist  -EF     Gait Training Goal PT LTG, Assist Device  walker, rolling  -EF     Gait Training Goal PT LTG, Distance to Achieve 100  -AR 40  -EF     Gait Training Goal PT LTG, Date Goal Reviewed 11/03/17  -AR      Gait Training Goal PT LTG, Outcome goal revised  -AR        User Key  (r) = Recorded By, (t) = Taken By, (c) = Cosigned By    Initials Name Provider Type    EF Bhargavi Briceno, PT Physical Therapist    HERNANDO Mendoza PT Physical Therapist          Physical Therapy Education     Title: PT OT SLP Therapies (Active)     Topic: Physical Therapy  (Active)     Point: Mobility training (Active)    Learning Progress Summary    Learner Readiness Method Response Comment Documented by Status   Patient Acceptance E NR  AR 11/04/17 1521 Active    Acceptance E NR  AR 11/03/17 1402 Active    Acceptance E VU  NR 11/02/17 1403 Done    Acceptance E,TB,D VU,NR   11/02/17 0928 Done    Acceptance E,TB,D VU,NR  RW 11/01/17 1035 Done    Acceptance E,TB,D VU,NR   10/31/17 1103 Done    Acceptance E VU  CC 10/27/17 1755 Done    Acceptance E VU  RW 10/27/17 1243 Done    Acceptance E,TB,D VU,NR   10/26/17 1210 Done    Acceptance E NR  EF 10/25/17 1359 Active               Point: Home exercise program (Active)    Learning Progress Summary    Learner Readiness Method Response Comment Documented by Status   Patient Acceptance E NR  AR 11/04/17 1521 Active    Acceptance E NR  AR 11/03/17 1402 Active    Acceptance E VU  NR 11/02/17 1403 Done    Acceptance E NR  EF 10/25/17 1359 Active               Point: Body mechanics (Active)    Learning Progress Summary    Learner Readiness Method Response Comment Documented by Status   Patient Acceptance E NR  AR 11/04/17 1521 Active    Acceptance E NR  AR 11/03/17 1402 Active    Acceptance E VU  NR 11/02/17 1403 Done    Acceptance E,TB,D VU,NR   11/02/17 0928 Done    Acceptance E,TB,D VU,NR   11/01/17 1035 Done    Acceptance E,TB,D VU,NR   10/31/17 1103 Done    Acceptance E VU  RW 10/27/17 1243 Done    Acceptance E,TB,D VU,NR   10/26/17 1210 Done    Acceptance E NR  EF 10/25/17 1359 Active               Point: Precautions (Active)    Learning Progress Summary    Learner Readiness Method Response Comment Documented by Status   Patient Acceptance E NR  AR 11/04/17 1521 Active    Acceptance E NR  AR 11/03/17 1402 Active    Acceptance E VU  NR 11/02/17 1403 Done    Acceptance E,TB,D VU,NR   11/02/17 0928 Done    Acceptance E,TB,D VU,NR   11/01/17 1035 Done    Acceptance E,TB,D VU,NR   10/31/17 1103 Done    Acceptance E VU  CC  10/27/17 1755 Done    Acceptance E VU  RW 10/27/17 1243 Done    Acceptance E,TB,D VU,NR  RW 10/26/17 1210 Done    Acceptance E NR  EF 10/25/17 1359 Active                      User Key     Initials Effective Dates Name Provider Type Discipline    EF 04/24/15 -  Bhargavi Briceno, PT Physical Therapist PT    NR 04/13/15 -  Delma Guerra MA,CCC-SLP Speech and Language Pathologist SLP    CC 03/03/17 -  Milagro Oneill, RN Registered Nurse Nurse    AR 06/27/16 -  Belkys Mendoza, PT Physical Therapist PT    RW 04/06/16 -  Yaneth Toscano, GWEN Physical Therapy Assistant PT                    PT Recommendation and Plan  Anticipated Discharge Disposition: skilled nursing facility  PT Frequency: daily  Plan of Care Review  Plan Of Care Reviewed With: patient  Outcome Summary/Follow up Plan: Pt demonstrates generalized weakness and limited activity tolerance during.  Ambulated 80' w/ rollator and min assist x2.             Outcome Measures       11/04/17 1500 11/03/17 1400 11/02/17 0900    How much help from another person do you currently need...    Turning from your back to your side while in flat bed without using bedrails? 3  -AR 3  -AR 3  -RW    Moving from lying on back to sitting on the side of a flat bed without bedrails? 3  -AR 3  -AR 3  -RW    Moving to and from a bed to a chair (including a wheelchair)? 3  -AR 3  -AR 3  -RW    Standing up from a chair using your arms (e.g., wheelchair, bedside chair)? 3  -AR 3  -AR 3  -RW    Climbing 3-5 steps with a railing? 1  -AR 1  -AR 1  -RW    To walk in hospital room? 3  -AR 3  -AR 3  -RW    AM-PAC 6 Clicks Score 16  -AR 16  -AR 16  -RW    Functional Assessment    Outcome Measure Options   AM-PAC 6 Clicks Basic Mobility (PT)  -RW      User Key  (r) = Recorded By, (t) = Taken By, (c) = Cosigned By    Initials Name Provider Type    HERNANDO Mendoza, PT Physical Therapist    RW Yaneth Toscano, PTA Physical Therapy Assistant           Time Calculation:         PT Charges        11/04/17 1522          Time Calculation    Start Time 1440  -AR      Stop Time 1454  -AR      Time Calculation (min) 14 min  -AR      PT Received On 11/04/17  -AR      PT - Next Appointment 11/06/17  -AR        User Key  (r) = Recorded By, (t) = Taken By, (c) = Cosigned By    Initials Name Provider Type    AR Belkys Mendoza PT Physical Therapist          Therapy Charges for Today     Code Description Service Date Service Provider Modifiers Qty    43768867370 HC PT THER PROC EA 15 MIN 11/3/2017 Belkys Mendoza, PT GP 1    16381584970 HC PT THER SUPP EA 15 MIN 11/3/2017 Belkys Mendoza, PT GP 1    01393400156 HC PT THER PROC EA 15 MIN 11/4/2017 Belkys Mendoza, PT GP 1    35375760309 HC PT THER SUPP EA 15 MIN 11/4/2017 Belkys Mendoza, PT GP 1          PT G-Codes  Outcome Measure Options: AM-PAC 6 Clicks Basic Mobility (PT)    Belkys Mendoza PT  11/4/2017

## 2017-11-04 NOTE — PLAN OF CARE
Problem: Patient Care Overview (Adult)  Goal: Plan of Care Review  Outcome: Ongoing (interventions implemented as appropriate)    11/04/17 0426   Coping/Psychosocial Response Interventions   Plan Of Care Reviewed With patient   Patient Care Overview   Progress no change   Outcome Evaluation   Outcome Summary/Follow up Plan Pt alert, oriented x4, denies pain at this time. Up in chair throughout day and night. Tolerating current diet, family asking about advancement from [facility] soft. VSS. Will continue to monitor.          Problem: Fall Risk (Adult)  Goal: Absence of Falls  Outcome: Ongoing (interventions implemented as appropriate)    Problem: Skin Integrity Impairment, Risk/Actual (Adult)  Goal: Skin Integrity/Wound Healing  Outcome: Ongoing (interventions implemented as appropriate)    Problem: Pain, Acute (Adult)  Goal: Acceptable Pain Control/Comfort Level  Outcome: Ongoing (interventions implemented as appropriate)

## 2017-11-04 NOTE — PLAN OF CARE
Problem: Patient Care Overview (Adult)  Goal: Plan of Care Review    11/04/17 1521   Coping/Psychosocial Response Interventions   Plan Of Care Reviewed With patient   Outcome Evaluation   Outcome Summary/Follow up Plan Pt demonstrates generalized weakness and limited activity tolerance during. Ambulated 80' w/ rollator and min assist x2.

## 2017-11-05 NOTE — PROGRESS NOTES
LOS: 14 days   Patient Care Team:  Justin Jordan MD as PCP - General  Justin Jordan MD as PCP - Family Medicine  KYM Ventura as PCP - [facility]  Dean Hermosillo RN as Care Coordinator (Population Health)    Subjective     Patient says is not been a good day she's been nauseated all day no emesis no abdominal pain no chest pain.  She has not walked.  She was out of the bed this morning and she just got out recently.  It had increase her O2 to 4 L and her saturations are running 85-88% on this.  Although she takes a couple deep breaths it quickly goes up to 92%    Review of Systems:          Objective     Vital Signs  Vital Sign Min/Max for last 24 hours  Temp  Min: 96.9 °F (36.1 °C)  Max: 98 °F (36.7 °C)   BP  Min: 112/62  Max: 134/67   Pulse  Min: 80  Max: 99   Resp  Min: 14  Max: 18   SpO2  Min: 91 %  Max: 97 %   Flow (L/min)  Min: 3  Max: 4   Weight  Min: 206 lb 8 oz (93.7 kg)  Max: 206 lb 8 oz (93.7 kg)        Ventilator/Non-Invasive Ventilation Settings     Start     Ordered    10/23/17 0948  . AutoBIPAP; Max IPAP: 16; Max EPAP: 8; Initial LPM: 6; Titrate for SPO2: 90%  Until Discontinued,   Status:  Canceled     Question Answer Comment   . AutoBIPAP    Max IPAP 16    Max EPAP 8    Initial LPM 6    Titrate for SPO2 90%        10/23/17 0947    10/23/17 0938  . AutoBIPAP; Max IPAP: 8; Max EPAP: 16; Initial LPM: 6; Titrate for SPO2: 90%  Until Discontinued,   Status:  Canceled     Question Answer Comment   . AutoBIPAP    Max IPAP 8    Max EPAP 16    Initial LPM 6    Titrate for SPO2 90%        10/23/17 0939                       Body mass index is 39.04 kg/(m^2).  I/O last 3 completed shifts:  In: 592 [P.O.:592]  Out: -   I/O this shift:  In: 20 [P.O.:20]  Out: -         Physical Exam:  General Appearance: Well-developed obese white female sitting up in a chair on 4 L nasal cannula O2   Eyes: Conjunctiva clear and anicteric  ENT: Mucous membranes are dry Mallampati 1  airway  Neck: Trachea midline no adenopathy  Lungs: Respirations are fairly shallow no wheezes rales or rhonchi no percussible dullness moderate thoracic kyphosis  Cardiac: Regular rate and rhythm  Abdomen: Obese soft no palpable organomegaly, no tenderness and active bowel sounds present.  No she reports 2 bowel movements today and one large one last night  : Not examined  Musc/Skel: Grossly normal  Skin: No jaundice no petechiae no rashes noted  Neuro: SHe is alert oriented cooperative  Extremities/P Vascular: She has 1+ pitting pretibial edema bilaterally palpable dorsalis pedis and radial pulses bilaterally  MSE: Not very happy today       Labs:    Results from last 7 days  Lab Units 11/05/17  0723 11/04/17  0425 11/03/17  0647 11/02/17  1415 11/02/17  0616 11/01/17  1059 10/31/17  1424 10/31/17  1148 10/31/17  0600 10/30/17  0724   GLUCOSE mg/dL 132* 147* 109*  --  104*  --   --  250* 162* 226*   SODIUM mmol/L 148* 146* 146* 144 149* 149* 147* 149* 154* 150*   POTASSIUM mmol/L 3.9 3.6 3.6  --  3.6  --   --  3.8 4.4  4.1 2.9*   CO2 mmol/L 39.6* 40.7* 43.0*  --  41.4*  --   --  44.5* 47.5* 45.2*   CHLORIDE mmol/L 100 98 98  --  98  --   --  93* 99 93*   ANION GAP mmol/L 8.4 7.3 5.0  --  9.6  --   --  11.5 7.5 11.8   CREATININE mg/dL 0.78 0.91 0.68  --  0.75  --   --  0.88 0.78 0.80   BUN mg/dL 26* 38* 35*  --  34*  --   --  39* 39* 36*   BUN / CREAT RATIO  33.3* 41.8* 51.5*  --  45.3*  --   --  44.3* 50.0* 45.0*   CALCIUM mg/dL 9.2 10.1 9.4  --  8.8  --   --  8.8 8.6 8.2*   EGFR IF NONAFRICN AM mL/min/1.73 70 59* 82  --  73  --   --  61 70 68     Estimated Creatinine Clearance: 53.7 mL/min (by C-G formula based on Cr of 0.78).        Results from last 7 days  Lab Units 11/03/17  0647 11/02/17  0616   WBC 10*3/mm3 7.29 8.80   RBC 10*6/mm3 3.83* 4.01   HEMOGLOBIN g/dL 11.0* 11.3*   HEMATOCRIT % 38.1 39.9   MCV fL 99.5* 99.5*   MCH pg 28.7 28.2   MCHC g/dL 28.9* 28.3*   RDW % 17.7* 17.8*   RDW-SD fl 64.1* 65.0*    MPV fL 11.5 11.1   PLATELETS 10*3/mm3 140 167   NEUTROPHIL % % 75.9 76.9*   LYMPHOCYTE % % 15.9* 14.2*   MONOCYTES % % 5.5 6.9   EOSINOPHIL % % 1.8 1.1   BASOPHIL % % 0.1 0.1   IMM GRAN % % 0.8* 0.8*   NEUTROS ABS 10*3/mm3 5.53 6.76   LYMPHS ABS 10*3/mm3 1.16 1.25   MONOS ABS 10*3/mm3 0.40 0.61   EOS ABS 10*3/mm3 0.13 0.10   BASOS ABS 10*3/mm3 0.01 0.01   IMMATURE GRANS (ABS) 10*3/mm3 0.06* 0.07*                           Results from last 7 days  Lab Units 11/02/17  1154   INR  0.98     Microbiology Results (last 10 days)     ** No results found for the last 240 hours. **                arformoterol 15 mcg Nebulization BID - RT   aspirin EC 81 mg Oral Daily   budesonide 0.5 mg Nebulization BID - RT   busPIRone 7.5 mg Oral BID   calcium-vitamin D 1,000 mg Oral Q12H   digoxin 125 mcg Oral Daily   fluticasone 2 spray Each Nare Daily   metoprolol succinate  mg Oral Daily   nystatin  Topical Q12H   pantoprazole 40 mg Oral Q AM          Diagnostics:  Xr Chest 2 View    Result Date: 10/13/2017  PA AND LATERAL CHEST X-RAY  HISTORY: SOA  triage protocol  COMPARISON: 05/02/2016.  FINDINGS: PA and lateral views of the chest were obtained. There is severe kyphosis. The lungs are hyperexpanded suggesting COPD. There is underlying emphysema. There is an opacity in the right lower lobe which may be related to atelectasis or pneumonia. There is pulmonary vascular congestion without overt edema/CHF. The heart is enlarged. Small effusions are present posteriorly. There is generalized osteopenia. There are several compression deformities in the thoracolumbar spine one of which is status post vertebroplasty. They are stable compared to previous (and most likely osteoporotic in nature considering marked regional osteopenia.)      1. Emphysema/COPD with increasing right lower lobe opacity which could be related to an area of pneumonia. 2. Pulmonary vascular congestion with small effusions  This report was finalized on 10/13/2017  10:35 PM by Sukhwinder Shay MD.      Ct Angiogram Chest With & Without Contrast    Result Date: 10/23/2017  CT ANGIOGRAM OF THE CHEST. MULTIPLE CORONAL, SAGITTAL, AND 3-D RECONSTRUCTIONS.  HISTORY: 85-year-old female with acute respiratory failure.  TECHNIQUE: Radiation dose reduction techniques were utilized, including automated exposure control and exposure modulation based on body size. CT angiogram of the chest was performed. Multiple coronal, sagittal, and 3-D reconstruction images were obtained. Compared with chest radiograph performed earlier today as well as noncontrasted chest CT from 02/26/2016.  FINDINGS: There is adequate opacification of the pulmonary arteries and branches. There are no filling defects or evidence for pulmonary thromboemboli. The pulmonary arteries are enlarged measuring 3.3 cm transversely, previously up to 3.0 cm. There is bronchial thickening of both the lower lobes and there are airspace consolidations at the dependent aspect of both lower lobes. Small right and tiny left pleural effusions are present. There is no pericardial effusion. There are moderately extensive underlying emphysematous changes. There is no lymphadenopathy within the chest.      1. There is no evidence for pulmonary thromboemboli. 2. The airspace consolidations at both lower lobes are suspicious for pneumonia. Small right and tiny left pleural effusions. 3. Moderately extensive emphysema. Enlarged pulmonary arteries suggest pulmonary arterial hypertension.  This report was finalized on 10/23/2017 11:52 AM by Dr. Neema Coppola MD.      Xr Chest 1 View    Result Date: 10/23/2017  PORTAL CHEST SINGLE VIEW 08:45  HISTORY: 85-year-old morbidly obese female with decreased O2 saturation  COMPARISON: 10/22/2017  FINDINGS: 1. Limited imaging due to body habitus portable technique and low lung volumes. 2. Question mild bibasilar atelectasis and pleural fluid, no interval change. 3. Cardiac enlargement and underlying chronic  lung disease. 4. Follow-up with lateral view or CT chest would be helpful for more accurate assessment  This report was finalized on 10/23/2017 9:45 AM by Dr. Anthony Ware MD.      Xr Chest 1 View    Result Date: 10/22/2017  PORTAL CHEST SINGLE VIEW  HISTORY: Morbidly obese 85-year-old female with history of hypertension, COPD, congestive failure, and London's esophagus presents for evaluation of shortness of breath.  COMPARISON: 10/16/2017  FINDINGS: 1. Limited imaging due to body habitus portable technique and low lung volumes. 2. Persistent opacification right base suggesting atelectasis and effusion. 3. Prominent cardiomegaly. 4. Underlying chronic pulmonary change. 5. Suspect hydrostatic edema, can't rule out underlying pneumonia. 6. Follow-up PA and lateral would be helpful in patient's condition permits, alternatively CT may be useful.  This report was finalized on 10/22/2017 5:03 PM by Dr. Anthony Ware MD.      Xr Chest Pa & Lateral    Result Date: 10/27/2017  PA AND LATERAL CHEST 10/27/2017  HISTORY: Pneumonia.  FINDINGS: Heart size is mild to moderately enlarged. There is bilateral vascular congestion. There may be some atelectasis or mild pneumonia in the right lung base and small right pleural effusion. Vertebroplasty of a mid to lower thoracic vertebra is seen. Compression deformities of 2 or 3 additional lower thoracic vertebrae are seen.      1. Cardiomegaly and mild vascular congestion. 2. Right lower lobe atelectasis and/or pneumonia and small right pleural effusion.  This report was finalized on 10/27/2017 3:04 PM by Dr. Fran Norman MD.      Xr Chest Pa & Lateral    Result Date: 10/16/2017  TWO-VIEW CHEST  HISTORY: COPD. Possible pneumonia and shortness of breath.  The patient is somewhat rotated to the right and there is worsening of consolidation and pleural fluid at the right base when compared to the study of 3 days ago and most consistent with worsening pneumonia and some associated  pleural effusion. The heart remains enlarged and there is also mild vascular congestion that is unchanged.  This report was finalized on 10/16/2017 6:41 PM by Dr. Dustin Trinh MD.      Fl Video Swallow    Result Date: 10/31/2017  VIDEO ESOPHAGRAM  HISTORY: Dysphagia.  FINDINGS: The patient exhibits mild oropharyngeal dysphagia with an episode of aspiration of thin liquids after the swallow. There is also some deep penetration with juice wash following mechanical soft. Please also see the speech therapist's report. The fluoroscopy time measures 2 minutes and 14 seconds.  This report was finalized on 10/31/2017 12:52 PM by Dr. Dustin Trinh MD.      Us Thoracentesis Right    Result Date: 11/3/2017  ULTRASOUND OF RIGHT CHEST FOR THORACENTESIS  FINDINGS: The ultrasound evaluation shows only a small right pleural effusion. No diagnostic orders were submitted and the size of the effusion is considered too small for therapeutic purposes.  This report was finalized on 11/3/2017 7:15 PM by Dr. Dustin Trinh MD.      Results for orders placed during the hospital encounter of 10/14/17   Adult Transthoracic Echo Complete W/ Cont if Necessary Per Protocol    Narrative · Left ventricular systolic function is normal. Calculated EF = 57.6%.   Estimated EF was in agreement with the calculated EF. Normal left   ventricular cavity size and wall thickness noted. All left ventricular   wall segments contract normally. Left ventricular diastolic was unable to   be assessed.  · Normal right ventricular systolic function noted. Right ventricular   cavity is mild-to-moderately dilated.  · Moderate to severe tricuspid valve regurgitation is present. Estimated   right ventricular systolic pressure from tricuspid regurgitation is mildly   elevated (35-45 mmHg).  · Left atrial cavity size is mild-to-moderately dilated. Saline test   results are negative.          I have reviewed her x-rays and CT scans    Active Hospital Problems (**  Indicates Principal Problem)    Diagnosis Date Noted   • **Acute respiratory failure with hypoxia and hypercapnia [J96.01, J96.02] 10/23/2017   • Hypernatremia [E87.0] 10/26/2017   • DNR (do not resuscitate) [Z66] 10/24/2017   • Hypertension [I10] 02/01/2016   • On home O2 [Z99.81] 02/01/2016   • Chronic low back pain [M54.5, G89.29] 02/01/2016   • Acute exacerbation of chronic obstructive airways disease [J44.1] 02/01/2016   • Acute on chronic diastolic heart failure [I50.33] 02/01/2016      Resolved Hospital Problems    Diagnosis Date Noted Date Resolved   No resolved problems to display.         Assessment/Plan     1. Acute on chronic hypoxic and hypercapnic respiratory failure She is worse again tonight and I'm not sure how high she doesn't seem to be wheezing seems to be moving air although her respirations are pretty shallow I wonder if she just developed atelectasis and hypoventilating with her nausea but she's not really having abdominal pain.  2. Acute exacerbation COPD better continue bronchodilators   3. Acute on chronic diastolic CHF better  4. Chronic back pain  5. Hypertension  6. Hypernatremia  7. Pleural effusion effusion very tiny to small to tap has not enlarged any.  8. Obesity complicates many of her problems    Plan for disposition:    Calos Nichols MD  11/05/17  5:08 PM    Time:

## 2017-11-05 NOTE — PROGRESS NOTES
[facility]     LOS: 14 days     Name: Pooja Duque  Age: 85 y.o.  Sex: female  :  1931  MRN: [id number]         Primary Care Physician: Justin Jordan MD    Diet Regular; Nectar / Syrup Thick; Cardiac, Consistent Carbohydrate, Renal    Subjective   No complaints. Doesn't like honey thickened liquids.    Objective   Temp:  [96.2 °F (35.7 °C)-98 °F (36.7 °C)] 98 °F (36.7 °C)  Heart Rate:  [80-99] 80  Resp:  [16-18] 18  BP: (112-134)/(55-67) 116/66  SpO2:  [91 %-97 %] 92 %  on  Flow (L/min):  [3-4] 4;   O2 Device: nasal cannula  Body mass index is 39.04 kg/(m^2).    Physical Exam   Constitutional: She is oriented to person, place, and time. No distress.   Cardiovascular: Normal rate and regular rhythm.    Pulmonary/Chest: Effort normal. No respiratory distress. She has decreased breath sounds. She has no wheezes. She has no rhonchi.   Abdominal: Soft. There is no tenderness.   Musculoskeletal: She exhibits no edema (trace-1+).   Neurological: She is alert and oriented to person, place, and time.   Skin: Skin is warm and dry.     Reviewed medications and new clinical results    arformoterol 15 mcg Nebulization BID - RT   aspirin EC 81 mg Oral Daily   budesonide 0.5 mg Nebulization BID - RT   busPIRone 7.5 mg Oral BID   calcium-vitamin D 1,000 mg Oral Q12H   digoxin 125 mcg Oral Daily   fluticasone 2 spray Each Nare Daily   metoprolol succinate  mg Oral Daily   nystatin  Topical Q12H   pantoprazole 40 mg Oral Q AM        Results from last 7 days  Lab Units 17  0647 17  0616   WBC 10*3/mm3 7.29 8.80   HEMOGLOBIN g/dL 11.0* 11.3*   PLATELETS 10*3/mm3 140 167     Results from last 7 days  Lab Units 17  0723 17  0425 17  0647 17  1415 17  0616 17  1059 10/31/17  1424 10/31/17  1148 10/31/17  0600 10/30/17  0724   SODIUM mmol/L 148* 146* 146* 144 149* 149* 147* 149* 154* 150*   POTASSIUM mmol/L  3.9 3.6 3.6  --  3.6  --   --  3.8 4.4  4.1 2.9*   CHLORIDE mmol/L 100 98 98  --  98  --   --  93* 99 93*   CO2 mmol/L 39.6* 40.7* 43.0*  --  41.4*  --   --  44.5* 47.5* 45.2*   BUN mg/dL 26* 38* 35*  --  34*  --   --  39* 39* 36*   CREATININE mg/dL 0.78 0.91 0.68  --  0.75  --   --  0.88 0.78 0.80   CALCIUM mg/dL 9.2 10.1 9.4  --  8.8  --   --  8.8 8.6 8.2*   GLUCOSE mg/dL 132* 147* 109*  --  104*  --   --  250* 162* 226*     Estimated Creatinine Clearance: 53.7 mL/min (by C-G formula based on Cr of 0.78).    Intake/Output Summary (Last 24 hours) at 11/05/17 1306  Last data filed at 11/05/17 0835   Gross per 24 hour   Intake              500 ml   Output                0 ml   Net              500 ml     Last 2 weights    11/04/17  0311 11/05/17  0544   Weight: 206 lb 1.6 oz (93.5 kg) 206 lb 8 oz (93.7 kg)     Assessment/Plan   Active Hospital Problems (** Indicates Principal Problem)    Diagnosis Date Noted   • **Acute respiratory failure with hypoxia and hypercapnia [J96.01, J96.02] 10/23/2017   • Hypernatremia [E87.0] 10/26/2017   • DNR (do not resuscitate) [Z66] 10/24/2017   • Hypertension [I10] 02/01/2016   • On home O2 [Z99.81] 02/01/2016   • Chronic low back pain [M54.5, G89.29] 02/01/2016   • Acute exacerbation of chronic obstructive airways disease [J44.1] 02/01/2016   • Acute on chronic diastolic heart failure [I50.33] 02/01/2016      Resolved Hospital Problems    Diagnosis Date Noted Date Resolved   No resolved problems to display.     · Restart lasix at some point but not today with Na still elevated. She doesn't drink a lot of water because of modified diet. VFSS tomorrow hopefully she can be upgraded  · Previously was at assisted living facility. Home there vs skilled facility soon.    Mikie Callejas MD   11/05/17  1:06 PM

## 2017-11-05 NOTE — PLAN OF CARE
Problem: Patient Care Overview (Adult)  Goal: Plan of Care Review  Outcome: Ongoing (interventions implemented as appropriate)    11/05/17 1901   Coping/Psychosocial Response Interventions   Plan Of Care Reviewed With patient;family   Patient Care Overview   Progress no change   Outcome Evaluation   Outcome Summary/Follow up Plan Pt has slept most of the day. When woken for medications and bathing time, pt said that she did not feel well last night so she was making up for the sleep not gotten the night before. No c/o pain. Pt did not eat breakfast or lunch. Family brought sandwiched from [employer] and finally work patient up. She complained that noon has been in her room all day. Lings sound congested today and pt has been receiving breathing treatments as scheduled. Mental status is in question. C/o nausea.  Call placed to MD for medication.  Will cont to monitor.         Problem: Fall Risk (Adult)  Goal: Absence of Falls  Outcome: Ongoing (interventions implemented as appropriate)    Problem: Skin Integrity Impairment, Risk/Actual (Adult)  Goal: Skin Integrity/Wound Healing  Outcome: Ongoing (interventions implemented as appropriate)    Problem: Pain, Acute (Adult)  Goal: Acceptable Pain Control/Comfort Level  Outcome: Ongoing (interventions implemented as appropriate)    Problem: Respiratory Insufficiency (Adult)  Goal: Acid/Base Balance  Outcome: Ongoing (interventions implemented as appropriate)  Goal: Effective Ventilation  Outcome: Ongoing (interventions implemented as appropriate)

## 2017-11-05 NOTE — PLAN OF CARE
Problem: Patient Care Overview (Adult)  Goal: Plan of Care Review  Outcome: Ongoing (interventions implemented as appropriate)    11/05/17 0616   Coping/Psychosocial Response Interventions   Plan Of Care Reviewed With patient   Patient Care Overview   Progress improving   Outcome Evaluation   Outcome Summary/Follow up Plan Pt rested well overnight. Oxygen increased to 4L, very SOA when up to BSC. No c/o pain . Will continue to monitor       Goal: Adult Individualization and Mutuality  Outcome: Ongoing (interventions implemented as appropriate)    Problem: Fall Risk (Adult)  Goal: Absence of Falls  Outcome: Ongoing (interventions implemented as appropriate)    Problem: Skin Integrity Impairment, Risk/Actual (Adult)  Goal: Skin Integrity/Wound Healing  Outcome: Ongoing (interventions implemented as appropriate)    Problem: Pain, Acute (Adult)  Goal: Acceptable Pain Control/Comfort Level  Outcome: Ongoing (interventions implemented as appropriate)    Problem: Respiratory Insufficiency (Adult)  Goal: Acid/Base Balance  Outcome: Ongoing (interventions implemented as appropriate)  Goal: Effective Ventilation  Outcome: Ongoing (interventions implemented as appropriate)

## 2017-11-06 NOTE — NURSING NOTE
"PT REFUSED LAB DRAW THIS AM, ACCUSING STAFF OF TRYING TO GET HER MONEY, ,REPEATEDLY STATING ,\"THIS IS A JOKE\"., \"CALL THE POLICE\". EARLIER PRIOR TO THIS, PT WANTING TO REMOVE O2 SENSOR OFF HER FINGER, SATING ITS A JOKE, SHE DOES NOT NEED IT. CHARGE NURSE, IN ROOM WITH PT TO ASSESS SITUATION. STAFF UNABLE TO RE-ORIENT PT AND EXPLAIN TO HER CURRENT SITUATION, WANTS TO LEAVE THE HOSPITAL. PHONE GIVEN TO PT AS PER HER REQUEST TO CALL FAMILY.   "

## 2017-11-06 NOTE — MBS/VFSS/FEES
Acute Care - Speech Language Pathology   Swallow Initial Evaluation [facility]     Patient Name: Pooja Duque  : 1931  MRN: [id number]  Today's Date: 2017  Onset of Illness/Injury or Date of Surgery Date: 10/22/17            Admit Date: 10/22/2017  SPEECH-LANGUAGE PATHOLOGY EVALUTION - VFSS  Subjective: The patient was seen on this date for a VFSS(Videofluoroscopic Swallowing Study).  Patient was alert and cooperative.    The patient's history is significant for respiratory failure, COPD exacerbation.   Objective: Risks/benefits were reviewed with the patient, and consent was obtained. The study was completed with SLP present and Radiologist review. The patient was seen in lateral view(s). Textures given included thin liquid, nectar thick liquid, puree consistency, mechanical soft consistency and regular consistency.  Assessment: Pt demonstrated mild dysphagia characterized by inconsistent lack of oral control, decreased tongue base strength/function, and mistiming. This VFSS looked better than 10/30/17. Pt inconsistently spilled to the pyriforms with thin, mech soft, and regular textures. Silent penetration of thin (large drink) was noted x1 due to mistiming. No penetration noted with small sip size and pt had better control, therefore, not spilling to pyriforms. Spillage of juice from mech soft was noted to pyriforms with silent trace penetration. Mastication of regular texture was slow and effortful. Pt had mild pharyngeal residuals (tongue base, valleculae, pyriforms) which were cleared with second swallow, typically spontaneously.  SLP Findings: Patient presents with mild oropharyngeal dysphagia.   Recommendations: Diet Textures: thin liquid, mechanical soft consistency with no mixed textures food (whole meat). Medications should be taken whole with puree.   Recommended Strategies: Upright for PO, small bites and sips, double swallow with all and no straw. Oral care before breakfast,  after all meals and PRN.    Dysphagia therapy is recommended. Rationale: diet tolerance and swallow strengthening.      Visit Dx:     ICD-10-CM ICD-9-CM   1. Acute systolic congestive heart failure I50.21 428.21     428.0   2. Difficulty walking R26.2 719.7     Patient Active Problem List   Diagnosis   • Abnormal weight gain   • Acute upper respiratory infection   • [place] esophagus   • Benign colonic polyp   • Atherosclerosis of coronary artery   • COPD, severe   • Chest pressure   • Acute on chronic diastolic heart failure   • CN (constipation)   • Acute exacerbation of chronic obstructive airways disease   • Bleeding from the nose   • Bloodgood disease   • Hypertension   • Hypoxia   • Eczema intertrigo   • Leg cramp   • Chronic low back pain   • OP (osteoporosis)   • Left leg pain   • Basal cell papilloma   • Superficial thrombophlebitis of leg   • On home O2   • Intractable vomiting with nausea   • Acute on chronic respiratory failure with hypoxia   • Hospital discharge follow-up   • Bilateral leg edema   • Anemia   • Abdominal bloating   • Medicare annual wellness visit, initial   • Wheelchair bound   • Pneumonia of right lower lobe due to infectious organism   • Atrial flutter   • Non-intractable vomiting with nausea   • Acute kidney injury   • Acute respiratory failure with hypoxia and hypercapnia   • DNR (do not resuscitate)   • Hypernatremia     Past Medical History:   Diagnosis Date   • Anemia    • Atherosclerosis of coronary artery 2/1/2016   • [place] esophagus 2/1/2016   • Cancer    • Candidiasis    • CHF (congestive heart failure)    • Chronic diastolic heart failure 2/1/2016   • Colon polyp    • COPD (chronic obstructive pulmonary disease)    • COPD, severe 2/1/2016   • FLOYD (dyspnea on exertion)    • Eczema intertrigo 2/1/2016   • Esophageal reflux    • Fracture, carpal bone    • Hiatal hernia    • Hypertension    • Low back pain    • Macular degeneration    • On home O2 2/1/2016   • Osteopenia    •  Osteoporosis    • PND (post-nasal drip) 2/1/2016   • Pneumonia    • Superficial thrombophlebitis of leg 2/1/2016     Past Surgical History:   Procedure Laterality Date   • APPENDECTOMY     • FOOT SURGERY Bilateral    • VERTEBRAL AUGMENTATION  10/02/2015    percutaneous vertebral augmentation kyphoplasty- T8- Dr. Alejandro    • WRIST SURGERY Left           SWALLOW EVALUATION (last 72 hours)      Swallow Evaluation       11/06/17 1500 11/04/17 1300             Rehab Evaluation    Document Type evaluation  -AW re-evaluation  -LT       Subjective Information no complaints;agree to therapy  -AW agree to therapy  -LT       Patient Effort, Rehab Treatment good  -AW        Symptoms Noted During/After Treatment none  -AW none  -LT       General Information    Patient Profile Review yes  -AW yes  -LT       Current Diet Limitations regular solid;nectar thick liquids  -AW        Plans/Goals Discussed With patient  -AW patient  -LT       Barriers to Rehab none identified  -AW        Clinical Impression    Patient's Goals For Discharge return to regular diet  -AW return to regular diet  -LT       SLP Swallowing Diagnosis mild dysphagia  -AW        Rehab Potential/Prognosis, Swallowing good, to achieve stated therapy goals  -AW good, to achieve stated therapy goals  -LT       Criteria for Skilled Therapeutic Interventions Met skilled criteria for dysphagia intervention met  -AW skilled criteria for dysphagia intervention met  -LT       FCM, Swallowing 5-->Level 5  -AW        Therapy Frequency PRN  -AW PRN  -LT       Predicted Duration Therapy Interv (days) until discharge  -AW until discharge  -LT       Expected Duration Therapy Session (min) 15-30 minutes  -AW        SLP Diet Recommendation soft textures;whole;thin liquids;other (see comments)   no mixed  -AW        Recommended Feeding/Eating Techniques maintain upright posture during/after eating for 30 mins;small sips/bites;no straws  -AW        SLP Rec. for Method of  Medication Administration meds whole in pudding/applesauce  -AW        Monitor For Signs Of Aspiration cough;gurgly voice;throat clearing;fever;upper respiratory infection;pneumonia;right lower lobe infiltrates  -AW        Anticipated Discharge Disposition other (see comments)   unknown  -AW        Pain Assessment    Pain Assessment No/denies pain  -AW No/denies pain  -LT       Cognitive Assessment/Intervention    Current Cognitive/Communication Assessment functional  -AW functional  -LT       Orientation Status oriented x 4  -AW        Follows Commands/Answers Questions 100% of the time;able to follow single-step instructions  -AW        Personal Safety mild impairment  -AW        Personal Safety Interventions fall prevention program maintained  -AW        Oral Motor Structure and Function    Oral Motor Anatomy and Physiology patient demonstrates anatomy that is WNL  -AW patient demonstrates anatomy that is WNL  -LT       Dentition Assessment upper dentures/partial in place  -AW present and adequate;other (see comments)   no bottom dentures  -LT       Secretion Management WNL/WFL  -AW        Mucosal Quality moist, healthy  -AW        Volitional Swallow no difficulties initiating volitional swallow  -AW        Volitional Cough no difficulties initiating volitional cough  -AW        Oral Musculature General Assessment WNL (within normal limits)  -AW WNL (within normal limits)  -LT       Videofluoroscopic Swallowing Exam    Risks/Benefits Reviewed risks/benefits explained;agreed to eval;patient  -AW        SLP Communication to Radiology    Severity Level of Dysphagia mild dysphagia  -AW        Summary Statement Pt presented with thin, nectar, pureed, mech soft, and regular textures. Pt demonstrated mild dysphagia characterized by inconsistent lack of oral control, decreased tongue base strength/function, and mistiming. This VFSS looked better than 10/30/17. Pt inconsistently spilled to the pyriforms with thin, mech  soft, and regular textures. Silent penetration of thin (large drink) was noted x1 due to mistiming. No penetration noted with small sip size and pt had better control, therefore, not spilling to pyriforms. Spillage of juice from mech soft was noted to pyriforms with silent trace penetration. Mastication of regular texture was slow and effortful. Pt had mild pharyngeal residuals (tongue base, valleculae, pyriforms) which were cleared with second swallow, typically spontaneously.  -AW          User Key  (r) = Recorded By, (t) = Taken By, (c) = Cosigned By    Initials Name Effective Dates    LT Annie Caro, MS CCC-SLP 04/13/15 -     AW Debbie Newby, MS [facility]-SLP 04/13/15 -         EDUCATION  The patient has been educated in the following areas:   Dysphagia (Swallowing Impairment) Oral Care/Hydration.    SLP Recommendation and Plan  SLP Swallowing Diagnosis: mild dysphagia  SLP Diet Recommendation: soft textures, whole, thin liquids, other (see comments) (no mixed)  Recommended Feeding/Eating Techniques: maintain upright posture during/after eating for 30 mins, small sips/bites, no straws  SLP Rec. for Method of Medication Administration: meds whole in pudding/applesauce  Monitor For Signs Of Aspiration: cough, gurgly voice, throat clearing, fever, upper respiratory infection, pneumonia, right lower lobe infiltrates     Criteria for Skilled Therapeutic Interventions Met: skilled criteria for dysphagia intervention met  Anticipated Discharge Disposition: other (see comments) (unknown)  Rehab Potential/Prognosis, Swallowing: good, to achieve stated therapy goals  Therapy Frequency: PRN             Plan of Care Review  Plan Of Care Reviewed With: patient  Progress: improving          IP SLP Goals       11/02/17 1404 10/30/17 1345 10/28/17 1427    Safely Consume Diet    Safely Consume Diet- SLP, Date Established  10/30/17  -AW     Safely Consume Diet- SLP, Time to Achieve by discharge  -NR  by discharge  -MG    Safely Consume  Diet- SLP, Activity Level Patient will improve laryngeal elevation for safer, more efficient swallow  -NR      Safely Consume Diet- SLP, Outcome goal ongoing  -NR goal ongoing  -AW       User Key  (r) = Recorded By, (t) = Taken By, (c) = Cosigned By    Initials Name Provider Type    MG Zayda Bourne MA,CCC-SLP Speech and Language Pathologist    NR Delma Guerra MA,CCC-SLP Speech and Language Pathologist    JACQUELYN Newby MS CCC-SLP Speech and Language Pathologist             SLP Outcome Measures (last 72 hours)      SLP Outcome Measures       11/06/17 1500 11/04/17 1300       SLP Outcome Measures    Outcome Measure Used? Adult NOMS  -AW Adult NOMS  -LT     FCM Scores    FCM Chosen  Swallowing  -LT     Swallowing FCM Score 5  -AW 5  -LT       User Key  (r) = Recorded By, (t) = Taken By, (c) = Cosigned By    Initials Name Effective Dates    LT Annie Caro MS CCC-SLP 04/13/15 -     JACQUELYN Newby MS CCC-SLP 04/13/15 -            Time Calculation:         Time Calculation- SLP       11/06/17 1535          Time Calculation- SLP    SLP Start Time 1145  -AW      SLP Stop Time 1245  -AW      SLP Time Calculation (min) 60 min  -AW      SLP Received On 11/06/17  -AW        User Key  (r) = Recorded By, (t) = Taken By, (c) = Cosigned By    Initials Name Provider Type    JACQUELYN Newby MS CCC-SLP Speech and Language Pathologist          Therapy Charges for Today     Code Description Service Date Service Provider Modifiers Qty    [id number] HC ST MOTION FLUORO EVAL SWALLOW 4 11/6/2017 Debbie Newby MS CCC-SLP GN 1          SLP G-Codes  SLP NOMS Used?: Yes  Functional Limitations: Swallowing  Swallow Current Status (): At least 40 percent but less than 60 percent impaired, limited or restricted  Swallow Goal Status (): At least 20 percent but less than 40 percent impaired, limited or restricted  Swallow Discharge Status (): At least 40 percent but less than 60 percent impaired, limited or restricted    Debbie  MS [place] CCC-SLP  11/6/2017

## 2017-11-06 NOTE — PLAN OF CARE
Problem: Patient Care Overview (Adult)  Goal: Plan of Care Review  Outcome: Ongoing (interventions implemented as appropriate)    11/06/17 0544   Coping/Psychosocial Response Interventions   Plan Of Care Reviewed With patient   Patient Care Overview   Progress no change   Outcome Evaluation   Outcome Summary/Follow up Plan PT SLEPT ON AND OFF, O2SATS 89-92% ON 3L OF O2 PER NC. . AT TIMES DROPS TO 80'S WHEN ASLEEP. DR. PALOMARES AWARE OF CHNAGE IN O2 CONCENTRATION. CHANGED PER RT R/T HI PCO2 PER BLD GAS TEST. NO ACUTE DISTRESS. SOME CONFUSION OBSERVE LAST NIGHT, RESTLESS.        Goal: Adult Individualization and Mutuality  Outcome: Ongoing (interventions implemented as appropriate)  Goal: Discharge Needs Assessment  Outcome: Ongoing (interventions implemented as appropriate)    Problem: Fall Risk (Adult)  Goal: Absence of Falls  Outcome: Ongoing (interventions implemented as appropriate)    Problem: Skin Integrity Impairment, Risk/Actual (Adult)  Goal: Skin Integrity/Wound Healing  Outcome: Ongoing (interventions implemented as appropriate)    Problem: Pain, Acute (Adult)  Goal: Acceptable Pain Control/Comfort Level  Outcome: Ongoing (interventions implemented as appropriate)

## 2017-11-06 NOTE — PROGRESS NOTES
Daily Progress Note.     11/6/2017    Pooja Duque ,  85 y.o. ,female  [place] [id number] [address]      Brief problem (summary)  · Acute on chronic respiratory failure  · COPD exacerbation  · CHF  · Pleural effusion, on the right side  · Immobility syndrome        Today, feels ok, sitting up in chair    PMS/FH/SH: reviewed and unchanged.  Medications:     arformoterol 15 mcg Nebulization BID - RT   aspirin EC 81 mg Oral Daily   budesonide 0.5 mg Nebulization BID - RT   busPIRone 7.5 mg Oral BID   calcium-vitamin D 1,000 mg Oral Q12H   digoxin 125 mcg Oral Daily   fluticasone 2 spray Each Nare Daily   metoprolol succinate  mg Oral Daily   nystatin  Topical Q12H   pantoprazole 40 mg Oral Q AM          ROS:  Respiratory ROS: positive for - shortness of breath    Objective  Temp:  [97.8 °F (36.6 °C)-98.9 °F (37.2 °C)] 98.3 °F (36.8 °C)  Heart Rate:  [] 104  Resp:  [14-24] 24  BP: ()/(51-73) 114/73  I/O last 3 completed shifts:  In: 140 [P.O.:140]  Out: 500 [Urine:500]       Physical Exam   Constitutional: She is oriented to person, place, and time. She appears well-developed and well-nourished. No distress. Nasal cannula in place.   HENT:   Head: Normocephalic and atraumatic.   Eyes: Pupils are equal, round, and reactive to light. No scleral icterus.   Cardiovascular: Normal rate and regular rhythm.    Pulmonary/Chest: Effort normal. No respiratory distress. She has no decreased breath sounds. She has no wheezes.   Abdominal: Soft. Bowel sounds are normal. There is no hepatosplenomegaly.   Musculoskeletal: She exhibits edema (1+).   Neurological: She is alert and oriented to person, place, and time.   Skin: No cyanosis. Nails show no clubbing.   Psychiatric: She has a normal mood and affect. Her behavior is normal.        CT  CT shows significant emphysema  Small pleural effusion on the right side,  No evidence of pulmonary embolism    CXR: unchanged from previous X ray       Results from  last 7 days  Lab Units 11/03/17  0647 11/02/17  0616   WBC 10*3/mm3 7.29 8.80   HEMOGLOBIN g/dL 11.0* 11.3*   HEMATOCRIT % 38.1 39.9   PLATELETS 10*3/mm3 140 167       Results from last 7 days  Lab Units 11/06/17  0851 11/05/17  0723 11/04/17  0425   SODIUM mmol/L 145 148* 146*   POTASSIUM mmol/L 4.2 3.9 3.6   CHLORIDE mmol/L 98 100 98   CO2 mmol/L 37.9* 39.6* 40.7*   BUN mg/dL 25* 26* 38*   CREATININE mg/dL 0.85 0.78 0.91   GLUCOSE mg/dL 118* 132* 147*   CALCIUM mg/dL 9.3 9.2 10.1       Results from last 7 days  Lab Units 11/02/17  1154   INR  0.98       Results from last 7 days  Lab Units 11/05/17  1916   PH, ARTERIAL pH units 7.455*   PO2 ART mm Hg 70.9*   PCO2, ARTERIAL mm Hg 61.7*   HCO3 ART mmol/L 43.4*       ASSESSMENT/ PLAN:  · Acute on chronic respiratory failure.  I have turned down the oxygen to 4 L  .   · COPD exacerbation.  She recently was treated for pneumonia but she has significant emphysema on the CT.  I do agree with steroids but will start weaning   · Congestive heart failure, acute on chronic diastolic heart failure, add lasix, still her Pro-BNP is high  · Right pleural effusion, small   · Hypernatremia, resolved      I feel that this will her new norm, she has significant COPD    CODE STATUS noted  She is agreeable for rehab       Alvaro Dominguez MD, [facility]  Pulmonary, Critical Care and Sleep Medicine.  [facility]    11/6/2017 ,

## 2017-11-06 NOTE — PROGRESS NOTES
[facility]     LOS: 15 days     Name: Pooja Duque  Age: 85 y.o.  Sex: female  :  1931  MRN: [id number]         Primary Care Physician: Justin Jordan MD    Diet Regular; Nectar / Syrup Thick; Cardiac, Consistent Carbohydrate, Renal    Subjective   VFSS reportedly cleared patient for thin liquids. Patient is excited about water. Discussed with RN    Objective   Temp:  [98.3 °F (36.8 °C)-98.9 °F (37.2 °C)] 98.3 °F (36.8 °C)  Heart Rate:  [] 104  Resp:  [16-24] 24  BP: ()/(51-73) 114/73  SpO2:  [86 %-94 %] 87 %  on  Flow (L/min):  [3-6] 4;   O2 Device: nasal cannula  Body mass index is 39.68 kg/(m^2).    Physical Exam   Constitutional: She is oriented to person, place, and time. No distress.   Cardiovascular: Normal rate and regular rhythm.    Pulmonary/Chest: Effort normal. No respiratory distress. She has decreased breath sounds. She has no wheezes. She has no rhonchi.   Abdominal: Soft. There is no tenderness.   Musculoskeletal: She exhibits no edema (trace-1+).   Neurological: She is alert and oriented to person, place, and time.   Skin: Skin is warm and dry.     Reviewed medications and new clinical results    arformoterol 15 mcg Nebulization BID - RT   aspirin EC 81 mg Oral Daily   budesonide 0.5 mg Nebulization BID - RT   busPIRone 7.5 mg Oral BID   calcium-vitamin D 1,000 mg Oral Q12H   digoxin 125 mcg Oral Daily   fluticasone 2 spray Each Nare Daily   metoprolol succinate  mg Oral Daily   nystatin  Topical Q12H   pantoprazole 40 mg Oral Q AM        Results from last 7 days  Lab Units 17  0647 17  0616   WBC 10*3/mm3 7.29 8.80   HEMOGLOBIN g/dL 11.0* 11.3*   PLATELETS 10*3/mm3 140 167     Results from last 7 days  Lab Units 17  0851 17  0723 17  0425 17  0647 17  1415 17  0616 17  1059  10/31/17  1148 10/31/17  0600   SODIUM mmol/L 145 148* 146* 146* 144 149* 149*   < > 149* 154*   POTASSIUM mmol/L 4.2 3.9 3.6 3.6  --  3.6  --   --  3.8 4.4  4.1   CHLORIDE mmol/L 98 100 98 98  --  98  --   --  93* 99   CO2 mmol/L 37.9* 39.6* 40.7* 43.0*  --  41.4*  --   --  44.5* 47.5*   BUN mg/dL 25* 26* 38* 35*  --  34*  --   --  39* 39*   CREATININE mg/dL 0.85 0.78 0.91 0.68  --  0.75  --   --  0.88 0.78   CALCIUM mg/dL 9.3 9.2 10.1 9.4  --  8.8  --   --  8.8 8.6   GLUCOSE mg/dL 118* 132* 147* 109*  --  104*  --   --  250* 162*   < > = values in this interval not displayed.  Estimated Creatinine Clearance: 51 mL/min (by C-G formula based on Cr of 0.85).    Intake/Output Summary (Last 24 hours) at 11/06/17 1454  Last data filed at 11/06/17 0338   Gross per 24 hour   Intake                0 ml   Output              500 ml   Net             -500 ml     Last 2 weights    11/05/17  0544 11/06/17  0406   Weight: 206 lb 8 oz (93.7 kg) 209 lb 14.4 oz (95.2 kg)     Assessment/Plan   Active Hospital Problems (** Indicates Principal Problem)    Diagnosis Date Noted   • **Acute respiratory failure with hypoxia and hypercapnia [J96.01, J96.02] 10/23/2017   • Hypernatremia [E87.0] 10/26/2017   • DNR (do not resuscitate) [Z66] 10/24/2017   • Hypertension [I10] 02/01/2016   • On home O2 [Z99.81] 02/01/2016   • Chronic low back pain [M54.5, G89.29] 02/01/2016   • Acute exacerbation of chronic obstructive airways disease [J44.1] 02/01/2016   • Acute on chronic diastolic heart failure [I50.33] 02/01/2016      Resolved Hospital Problems    Diagnosis Date Noted Date Resolved   No resolved problems to display.     · VFSS she can have thins so hopefully this will help hypernatremia and can resume lasix. Will start at 20 mg a day and check Na.  · SNF on discharge soon    Mikie Callejas MD   11/06/17  2:54 PM

## 2017-11-06 NOTE — THERAPY TREATMENT NOTE
Acute Care - Physical Therapy Treatment Note  [facility]     Patient Name: Pooja Duque  : 1931  MRN: [id number]  Today's Date: 2017  Onset of Illness/Injury or Date of Surgery Date: 10/22/17  Date of Referral to PT: 10/25/17       Admit Date: 10/22/2017    Visit Dx:    ICD-10-CM ICD-9-CM   1. Acute systolic congestive heart failure I50.21 428.21     428.0   2. Difficulty walking R26.2 719.7     Patient Active Problem List   Diagnosis   • Abnormal weight gain   • Acute upper respiratory infection   • [place] esophagus   • Benign colonic polyp   • Atherosclerosis of coronary artery   • COPD, severe   • Chest pressure   • Acute on chronic diastolic heart failure   • CN (constipation)   • Acute exacerbation of chronic obstructive airways disease   • Bleeding from the nose   • Bloodgood disease   • Hypertension   • Hypoxia   • Eczema intertrigo   • Leg cramp   • Chronic low back pain   • OP (osteoporosis)   • Left leg pain   • Basal cell papilloma   • Superficial thrombophlebitis of leg   • On home O2   • Intractable vomiting with nausea   • Acute on chronic respiratory failure with hypoxia   • Hospital discharge follow-up   • Bilateral leg edema   • Anemia   • Abdominal bloating   • Medicare annual wellness visit, initial   • Wheelchair bound   • Pneumonia of right lower lobe due to infectious organism   • Atrial flutter   • Non-intractable vomiting with nausea   • Acute kidney injury   • Acute respiratory failure with hypoxia and hypercapnia   • DNR (do not resuscitate)   • Hypernatremia               Adult Rehabilitation Note       17 1143 17 1440 17 1401    Rehab Assessment/Intervention    Discipline physical therapy assistant  -CHARITY physical therapist  -AR physical therapist  -AR    Document Type therapy note (daily note)  -CHARITY therapy note (daily note)  -AR therapy note (daily note)  -AR    Subjective Information agree to therapy;complains of;weakness;fatigue;dyspnea  -CHARITY  agree to therapy   multiple complaints throughout tx  -AR agree to therapy   only to exercises, declines ambulation/standing ex  -AR    Patient Effort, Rehab Treatment  adequate  -AR adequate  -AR    Symptoms Noted During/After Treatment  none  -AR     Precautions/Limitations fall precautions;oxygen therapy device and L/min   4L  -JM fall precautions;oxygen therapy device and L/min  -AR     Recorded by [JM] Marie Hdez, PTA [AR] Belkys Mendoza, PT [AR] Belkys Mendoza, PT    Pain Assessment    Pain Assessment  No/denies pain  -AR No/denies pain  -AR    Recorded by  [AR] Belkys Mendoza, PT [AR] Belkys Mendoza, PT    Cognitive Assessment/Intervention    Current Cognitive/Communication Assessment  functional  -AR functional  -AR    Orientation Status  oriented x 4  -AR oriented x 4  -AR    Recorded by  [AR] Belkys Mendoza, PT [AR] Belkys Mendoza PT    Bed Mobility, Assessment/Treatment    Bed Mob, Supine to Sit, [place]  not tested  -AR not tested  -AR    Bed Mob, Sit to Supine, [place]  not tested  -AR not tested  -AR    Bed Mobility, Comment in chair  -JM      Recorded by [JM] Marie Hdez, PTA [AR] Belkys Mendoza, PT [AR] Belkys Mendoza, PT    Transfer Assessment/Treatment    Transfers, Sit-Stand [place] minimum assist (75% patient effort);moderate assist (50% patient effort);2 person assist required   from low chair  - minimum assist (75% patient effort);2 person assist required  -AR not tested  -AR    Transfers, Stand-Sit [place] contact guard assist;2 person assist required  - minimum assist (75% patient effort)  -AR not tested  -AR    Transfers, Sit-Stand-Sit, Assist Device rolling walker  - rolling walker  -AR     Toilet Transfer, [place] minimum assist (75% patient effort);2 person assist required  -      Transfer, Comment 2 attempts req for each time pt stood  -JM      Recorded by [JM] Marie Hdez, PTA [AR] Belkys Mendoza, PT [AR] Belkys Mendoza, PT     Gait Assessment/Treatment    Gait, [place] Level minimum assist (75% patient effort);contact guard assist;verbal cues required  - minimum assist (75% patient effort);2 person assist required  -AR     Gait, Assistive Device rollator  - rollator  -AR     Gait, Distance (Feet) 25  - 80  -AR     Gait, Gait Deviations lynda decreased;step length decreased;forward flexed posture  - lynda decreased;decreased heel strike  -AR     Gait, Safety Issues step length decreased;weight-shifting ability decreased;supplemental O2;balance decreased during turns  - step length decreased;weight-shifting ability decreased;supplemental O2  -AR     Gait, Impairments strength decreased;impaired balance  - strength decreased;impaired balance  -AR     Gait, Comment 1 standing rest, amb to stretcher for testing  - 3 sitting rest break  -AR     Recorded by [CHARITY] Marie Hdez PTA [AR] Belkys Mendoza PT     Therapy Exercises    Bilateral Lower Extremities   ankle pumps/circles;LAQ;hip flexion;hip abduction/adduction;10 reps   declined doing more than 10 reps  -AR    Recorded by   [AR] Belkys Mendoza PT    Positioning and Restraints    Pre-Treatment Position sitting in chair/recliner  - sitting in chair/recliner  -AR sitting in chair/recliner  -AR    Post Treatment Position other  - chair  -AR chair  -AR    In Chair  reclined;sitting;call light within reach;encouraged to call for assist  -AR sitting;call light within reach;encouraged to call for assist;exit alarm on  -AR    Other Position with other staff   on stretcher  -      Recorded by [CHARITY] Marie Hdez PTA [AR] Belkys Mendoza PT [AR] Belkys Mendoza PT      User Key  (r) = Recorded By, (t) = Taken By, (c) = Cosigned By    Initials Name Effective Dates    CHARITY Hdez PTA 02/18/16 -     AR Belkys Mendoza PT 06/27/16 -                 IP PT Goals       11/03/17 1404 10/25/17 1359       Bed Mobility PT LTG    Bed Mobility PT LTG, Time to  Achieve  1 wk  -EF     Bed Mobility PT LTG, Activity Type  all bed mobility  -EF     Bed Mobility PT LTG, [place] Level  conditional independence;supervision required  -EF     Bed Mobility PT LTG, Date Goal Reviewed 11/03/17  -AR      Bed Mobility PT LTG, Outcome goal ongoing  -AR      Transfer Training PT LTG    Transfer Training PT LTG, Time to Achieve  1 wk  -EF     Transfer Training PT LTG, Activity Type  all transfers  -EF     Transfer Training PT LTG, [place] Level  contact guard assist  -EF     Transfer Training PT LTG, Assist Device  walker, rolling  -EF     Transfer Training PT  LTG, Date Goal Reviewed 11/03/17  -AR      Transfer Training PT LTG, Outcome goal ongoing  -AR      Gait Training PT LTG    Gait Training Goal PT LTG, Time to Achieve  1 wk  -EF     Gait Training Goal PT LTG, [place] Level  contact guard assist  -EF     Gait Training Goal PT LTG, Assist Device  walker, rolling  -EF     Gait Training Goal PT LTG, Distance to Achieve 100  -AR 40  -EF     Gait Training Goal PT LTG, Date Goal Reviewed 11/03/17  -AR      Gait Training Goal PT LTG, Outcome goal revised  -AR        User Key  (r) = Recorded By, (t) = Taken By, (c) = Cosigned By    Initials Name Provider Type    EF Bhargavi Briceno, PT Physical Therapist    AR Belkys Mendoza, PT Physical Therapist          Physical Therapy Education     Title: PT OT SLP Therapies (Done)     Topic: Physical Therapy (Done)     Point: Mobility training (Done)    Learning Progress Summary    Learner Readiness Method Response Comment Documented by Status   Patient Acceptance E VU,NR feel rwx may be too tall for pt to use properly  11/06/17 1148 Done    Acceptance E NR  AR 11/04/17 1521 Active    Acceptance E NR  AR 11/03/17 1402 Active    Acceptance E VU  NR 11/02/17 1403 Done    Acceptance E,TB,D VU,NR  RW 11/02/17 0928 Done    Acceptance E,TB,D VU,NR  RW 11/01/17 1035 Done    Acceptance E,TB,D VU,NR  RW 10/31/17 1103 Done    Acceptance E VU    10/27/17 1755 Done    Acceptance E VU   10/27/17 1243 Done    Acceptance E,TB,D VU,NR   10/26/17 1210 Done    Acceptance E NR   10/25/17 1359 Active               Point: Home exercise program (Done)    Learning Progress Summary    Learner Readiness Method Response Comment Documented by Status   Patient Acceptance E VU,NR feel rwx may be too tall for pt to use properly  11/06/17 1148 Done    Acceptance E NR  AR 11/04/17 1521 Active    Acceptance E NR  AR 11/03/17 1402 Active    Acceptance E VU   11/02/17 1403 Done    Acceptance E NR   10/25/17 1359 Active               Point: Body mechanics (Done)    Learning Progress Summary    Learner Readiness Method Response Comment Documented by Status   Patient Acceptance E VU,NR feel rwx may be too tall for pt to use properly  11/06/17 1148 Done    Acceptance E NR  AR 11/04/17 1521 Active    Acceptance E NR  AR 11/03/17 1402 Active    Acceptance E VU   11/02/17 1403 Done    Acceptance E,TB,D VU,NR   11/02/17 0928 Done    Acceptance E,TB,D VU,NR   11/01/17 1035 Done    Acceptance E,TB,D VU,[facility] 10/31/17 1103 Done    Acceptance E VU   10/27/17 1243 Done    Acceptance E,TB,D VU,NR   10/26/17 1210 Done    Acceptance E NR   10/25/17 1359 Active               Point: Precautions (Done)    Learning Progress Summary    Learner Readiness Method Response Comment Documented by Status   Patient Acceptance E VU,NR feel rwx may be too tall for pt to use properly  11/06/17 1148 Done    Acceptance E NR  AR 11/04/17 1521 Active    Acceptance E NR  AR 11/03/17 1402 Active    Acceptance E VU   11/02/17 1403 Done    Acceptance E,TB,D VU,NR   11/02/17 0928 Done    Acceptance E,TB,D VU,NR   11/01/17 1035 Done    Acceptance E,TB,D VU,[facility] 10/31/17 1103 Done    Acceptance E VU   10/27/17 1755 Done    Acceptance E VU   10/27/17 1243 Done    Acceptance E,TB,D VU,NR   10/26/17 1210 Done    Acceptance E NR   10/25/17 7065 Active                      User  Key     Initials Effective Dates Name Provider Type Discipline    EF 04/24/15 -  Bhargavi Briceno, PT Physical Therapist PT    NR 04/13/15 -  Delma Guerra MA,CCC-SLP Speech and Language Pathologist SLP    CC 03/03/17 -  Milagro Oneill, RN Registered Nurse Nurse     02/18/16 -  Marie Hdez, GWEN Physical Therapy Assistant PT    AR 06/27/16 -  Belkys Mendoza, PT Physical Therapist PT    RW 04/06/16 -  Yaneth Toscano, GWEN Physical Therapy Assistant PT                    PT Recommendation and Plan  Anticipated Discharge Disposition: skilled nursing facility  PT Frequency: daily  Plan of Care Review  Plan Of Care Reviewed With: patient  Progress: progress toward functional goals is gradual  Outcome Summary/Follow up Plan: unable to incr amb dist due to going for testing          Outcome Measures       11/06/17 1100 11/04/17 1500 11/03/17 1400    How much help from another person do you currently need...    Turning from your back to your side while in flat bed without using bedrails? 3  - 3  -AR 3  -AR    Moving from lying on back to sitting on the side of a flat bed without bedrails? 3  - 3  -AR 3  -AR    Moving to and from a bed to a chair (including a wheelchair)? 3  - 3  -AR 3  -AR    Standing up from a chair using your arms (e.g., wheelchair, bedside chair)? 2  - 3  -AR 3  -AR    Climbing 3-5 steps with a railing? 1  - 1  -AR 1  -AR    To walk in hospital room? 3  - 3  -AR 3  -AR    AM-PAC 6 Clicks Score 15  - 16  -AR 16  -AR      User Key  (r) = Recorded By, (t) = Taken By, (c) = Cosigned By    Initials Name Provider Type    CHARITY Hdez, GWEN Physical Therapy Assistant    AR Belkys Mendoza, PT Physical Therapist           Time Calculation:         PT Charges       11/06/17 1149          Time Calculation    Start Time 1130  -      Stop Time 1147  -      Time Calculation (min) 17 min  -CHARITY      PT Received On 11/06/17  -CHARITY      PT - Next Appointment 11/07/17  -CHARITY        User Key   (r) = Recorded By, (t) = Taken By, (c) = Cosigned By    Initials Name Provider Type    CHARITY Hdez PTA Physical Therapy Assistant          Therapy Charges for Today     Code Description Service Date Service Provider Modifiers Qty    [id number] HC PT THER PROC EA 15 MIN 11/6/2017 Marie Hdez PTA GP 1          PT G-Codes  Outcome Measure Options: AM-PAC 6 Clicks Basic Mobility (PT)    Marie Hdez PTA  11/6/2017

## 2017-11-06 NOTE — PROGRESS NOTES
Continued Stay Note  [facility]     Patient Name: Pooja Duque  MRN: [id number]  Today's Date: 11/6/2017    Admit Date: 10/22/2017          Discharge Plan       11/06/17 1613    Case Management/Social Work Plan    Plan Plan [facility] for skilled care if family is agreeable to SNF.   Kalee,    Additional Comments Spoke with pt's daughter-in-law   (Zuleima  [phone]) who states she will discuss with pt's son.  If pt goes to skilled care she will not be able to return to [facility] and will have to move in with pt's son and his wife at discharge from [facility]. Zuleima states they will discuss and follow up with [facility] in am.  [facility]  ( [phone]) is following for [facility].  Kalee RN              Discharge Codes     None        Expected Discharge Date and Time     Expected Discharge Date Expected Discharge Time    Nov 7, 2017             Carleen Sharif, RN

## 2017-11-06 NOTE — PLAN OF CARE
Problem: Patient Care Overview (Adult)  Goal: Plan of Care Review  Outcome: Ongoing (interventions implemented as appropriate)    11/06/17 153   Coping/Psychosocial Response Interventions   Plan Of Care Reviewed With patient   Patient Care Overview   Progress improving

## 2017-11-06 NOTE — PLAN OF CARE
Problem: Inpatient SLP  Goal: Dysphagia- Patient will safely consume diet as per recommendation with no signs/symptoms of aspiration  Outcome: Ongoing (interventions implemented as appropriate)    10/30/17 1345 11/02/17 1404   Safely Consume Diet   Safely Consume Diet- SLP, Date Established 10/30/17 --    Safely Consume Diet- SLP, Time to Achieve --  by discharge   Safely Consume Diet- SLP, Activity Level --  Patient will improve laryngeal elevation for safer, more efficient swallow   Safely Consume Diet- SLP, Outcome --  goal ongoing

## 2017-11-06 NOTE — PLAN OF CARE
Problem: Patient Care Overview (Adult)  Goal: Plan of Care Review  Outcome: Ongoing (interventions implemented as appropriate)    11/06/17 1148   Coping/Psychosocial Response Interventions   Plan Of Care Reviewed With patient   Patient Care Overview   Progress progress toward functional goals is gradual   Outcome Evaluation   Outcome Summary/Follow up Plan unable to incr amb dist due to going for testing

## 2017-11-07 NOTE — PROGRESS NOTES
"DAILY PROGRESS NOTE  [facility]    Patient Identification:  Name: Pooja Duque  Age: 85 y.o.  Sex: female  :  1931  MRN: [id number]         Primary Care Physician: Justin Jordan MD    Subjective:  Interval History:She is sedated. Family wants palliative care.    Objective:    Scheduled Meds:  budesonide      ipratropium-albuterol      pantoprazole        Continuous Infusions:     Vital signs in last 24 hours:  Temp:  [98 °F (36.7 °C)-98.7 °F (37.1 °C)] 98.7 °F (37.1 °C)  Heart Rate:  [] 118  Resp:  [16-40] 24  BP: (128-159)/(60-85) 136/60    Intake/Output:    Intake/Output Summary (Last 24 hours) at 17 1542  Last data filed at 17 1700   Gross per 24 hour   Intake              120 ml   Output                0 ml   Net              120 ml       Exam:  /60 (BP Location: Left arm, Patient Position: Lying)  Pulse 118  Temp 98.7 °F (37.1 °C) (Oral)   Resp 24  Ht 60.98\" (154.9 cm)  Wt 205 lb 11.2 oz (93.3 kg)  SpO2 92%  BMI 38.89 kg/m2    General Appearance:    sedated, no distress   Head:    Normocephalic, without obvious abnormality, atraumatic   Eyes:       Throat:   Lips, tongue, gums normal   Neck:   Supple, symmetrical, trachea midline, no JVD   Lungs:     Crackles bilaterally, respirations unlabored   Chest Wall:    No tenderness or deformity    Heart:    Regular rate and rhythm, S1 and S2 normal, no murmur,no  Rub or gallop   Abdomen:     Soft, non-tender, bowel sounds active, no masses, no organomegaly    Extremities:   Extremities normal, atraumatic, no cyanosis or edema   Pulses:      Skin:   Skin is warm and dry,  no rashes or palpable lesions   Neurologic:   sedated      [unfilled]  Data Review:    Results from last 7 days  Lab Units 17  0637 17  0851 17  0723   SODIUM mmol/L 145 145 148*   POTASSIUM mmol/L 4.1 4.2 3.9   CHLORIDE mmol/L 96* 98 100   CO2 mmol/L 39.7* 37.9* 39.6*   BUN mg/dL 24* 25* 26*   CREATININE mg/dL 0.63 " 0.85 0.78   GLUCOSE mg/dL 117* 118* 132*   CALCIUM mg/dL 9.5 9.3 9.2       Results from last 7 days  Lab Units 11/03/17  0647 11/02/17  0616   WBC 10*3/mm3 7.29 8.80   HEMOGLOBIN g/dL 11.0* 11.3*   HEMATOCRIT % 38.1 39.9   PLATELETS 10*3/mm3 140 167               Lab Results  Lab Value Date/Time   TROPONINT <0.010 10/14/2017 1827   TROPONINT <0.010 10/14/2017 1502   TROPONINT <0.010 10/13/2017 2158   TROPONINT <0.01 03/01/2016 1317   TROPONINT <0.01 02/29/2016 2114               Invalid input(s): PROT, LABALBU          Glucose   Date/Time Value Ref Range Status   11/07/2017 0827 124 70 - 130 mg/dL Final       Results from last 7 days  Lab Units 11/02/17  1154   INR  0.98       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   • Benign colonic polyp K63.5   • Atherosclerosis of coronary artery I25.10   • COPD, severe J44.9   • Chest pressure R07.89   • Acute on chronic diastolic heart failure I50.33   • CN (constipation) K59.00   • Acute exacerbation of chronic obstructive airways disease J44.1   • Bleeding from the nose R04.0   • Bloodgood disease N60.19   • Hypertension I10   • Hypoxia R09.02   • Eczema intertrigo L30.4   • Leg cramp R25.2   • Chronic low back pain M54.5, G89.29   • OP (osteoporosis) M81.0   • Left leg pain M79.605   • Basal cell papilloma L82.1   • Superficial thrombophlebitis of leg I80.00   • On home O2 Z99.81   • Intractable vomiting with nausea R11.2   • Acute on chronic respiratory failure with hypoxia J96.21   • Hospital discharge follow-up Z09   • Bilateral leg edema R60.0   • Anemia D64.9   • Abdominal bloating R14.0   • Medicare annual wellness visit, initial Z00.00   • Wheelchair bound Z99.3   • Pneumonia of right lower lobe due to infectious organism J18.1   • Atrial flutter I48.92   • Non-intractable vomiting with nausea R11.2   • Acute kidney injury N17.9   • Acute respiratory failure  with hypoxia and hypercapnia J96.01, J96.02   • DNR (do not resuscitate) Z66   • Hypernatremia E87.0       Assessment:  Active Hospital Problems (** Indicates Principal Problem)    Diagnosis Date Noted   • **Acute respiratory failure with hypoxia and hypercapnia [J96.01, J96.02] 10/23/2017   • Hypernatremia [E87.0] 10/26/2017   • DNR (do not resuscitate) [Z66] 10/24/2017   • Hypertension [I10] 02/01/2016   • On home O2 [Z99.81] 02/01/2016   • Chronic low back pain [M54.5, G89.29] 02/01/2016   • Acute exacerbation of chronic obstructive airways disease [J44.1] 02/01/2016   • Acute on chronic diastolic heart failure [I50.33] 02/01/2016      Resolved Hospital Problems    Diagnosis Date Noted Date Resolved   No resolved problems to display.       Plan:  Palliative care. Measures in place. Discussed with family.     Ronny Delgado MD  11/7/2017  3:42 PM

## 2017-11-07 NOTE — CODE DOCUMENTATION
Dr. Dominguez notified of the events of the rapid response and the abg results. He is on his way to the bedside and requests the family be contacted

## 2017-11-07 NOTE — PROGRESS NOTES
Daily Progress Note.     11/7/2017    Pooja Duque ,  85 y.o. ,female  [facility]      Brief problem (summary)  · Acute on chronic respiratory failure  · COPD exacerbation  · CHF  · Pleural effusion, on the right side  · Immobility syndrome        Today, this morning patient is not doing very well she is very sleepy.  Her ABG shows respiratory acidosis with hypercapnia and a chest x-ray is worse.  She does complain of having shortness of breath.  A rapid response was called and patient was evaluated.  Patient recognizes me and tells me that she doesn't want to go on the breathing machine and she is feeling short of breath    PMS/FH/SH: reviewed and unchanged.  Medications:     arformoterol 15 mcg Nebulization BID - RT   aspirin EC 81 mg Oral Daily   budesonide 0.5 mg Nebulization BID - RT   busPIRone 7.5 mg Oral BID   calcium-vitamin D 1,000 mg Oral Q12H   digoxin 125 mcg Oral Daily   fluticasone 2 spray Each Nare Daily   furosemide 20 mg Oral Daily   metoprolol succinate  mg Oral Daily   nystatin  Topical Q12H   pantoprazole 40 mg Oral Q AM          ROS:  Respiratory ROS: positive for - shortness of breath    Objective  Temp:  [98 °F (36.7 °C)-99 °F (37.2 °C)] 98.4 °F (36.9 °C)  Heart Rate:  [] 108  Resp:  [16-40] 32  BP: (114-159)/(57-81) 159/73  I/O last 3 completed shifts:  In: 540 [P.O.:540]  Out: 500 [Urine:500]       Physical Exam   Constitutional: She is oriented to person, place, and time. She is easily aroused. She appears distressed. Nasal cannula in place.   HENT:   Head: Normocephalic and atraumatic.   Eyes: Pupils are equal, round, and reactive to light. No scleral icterus.   Cardiovascular: Normal rate and regular rhythm.    Pulmonary/Chest: Effort normal. No respiratory distress. She has no decreased breath sounds. She has no wheezes.   Abdominal: Soft. Bowel sounds are normal. There is no hepatosplenomegaly.   Musculoskeletal: She exhibits edema (1+).    Neurological: She is oriented to person, place, and time and easily aroused.   Skin: No cyanosis. Nails show no clubbing.   Psychiatric: Her behavior is normal.              Results from last 7 days  Lab Units 11/03/17  0647 11/02/17  0616   WBC 10*3/mm3 7.29 8.80   HEMOGLOBIN g/dL 11.0* 11.3*   HEMATOCRIT % 38.1 39.9   PLATELETS 10*3/mm3 140 167       Results from last 7 days  Lab Units 11/07/17  0637 11/06/17  0851 11/05/17  0723   SODIUM mmol/L 145 145 148*   POTASSIUM mmol/L 4.1 4.2 3.9   CHLORIDE mmol/L 96* 98 100   CO2 mmol/L 39.7* 37.9* 39.6*   BUN mg/dL 24* 25* 26*   CREATININE mg/dL 0.63 0.85 0.78   GLUCOSE mg/dL 117* 118* 132*   CALCIUM mg/dL 9.5 9.3 9.2       Results from last 7 days  Lab Units 11/02/17  1154   INR  0.98       Results from last 7 days  Lab Units 11/07/17  0832   PH, ARTERIAL pH units 7.234*   PO2 ART mm Hg 139.2*   PCO2, ARTERIAL mm Hg 106.4*   HCO3 ART mmol/L 44.9*       ASSESSMENT/ PLAN:  · Acute on chronic respiratory failure.    · Respiratory acidosis with hypercapnia  · COPD exacerbation.  She has significant COPD along with diastolic heart failure  · Congestive heart failure, acute on chronic diastolic heart failure,   · Right pleural effusion, small   · Hypernatremia, resolved    11/7/17  This morning patient's condition has deteriorated.  She was requiring more oxygen and also she has developed respiratory acidosis with hypercapnia.  Her pH is 7.2 3 and PCO2 is 106.  I spoke to both the patient and her son Nate.  Nate will be coming to the hospital soon.  Patient will be kept comfortable with palliative orders.      CODE STATUS noted        Alvaro Dominguez MD, [facility]  Pulmonary, Critical Care and Sleep Medicine.  [facility]    11/7/2017 ,

## 2017-11-07 NOTE — NURSING NOTE
Called Palliative, gave report to DUSTIN Heredia. Family aware of transfer. Transport notified of need to transport pt to palliative unit.

## 2017-11-07 NOTE — NURSING NOTE
Rapid called on pt at 0808 due to drop in O2sats, shallow and rapid breathing. Rapid team arrived at bedside pt placed on [place] O2, arterial blood gas obtained at bedside. Later pt was placed on nasal canula and maintained appropriate O2 levels. Dr Dominguez called and notified of pt's condition. MD spoke with son and pt became palliative. Medications were discontinued and none administered this am. Pt alert and oriented, resting comfortably at this time. Will transfer pt to room 495.

## 2017-11-07 NOTE — CODE DOCUMENTATION
Rapid reponse was called when the patient's oxygen saturation dropped to 44% while she was sitting in the chair on 5L nasal cannula.  When the rapid response team arrived she was transferred from the chair to the bed and placed on a 10L high flow nasal cannula.

## 2017-11-07 NOTE — PLAN OF CARE
Problem: Patient Care Overview (Adult)  Goal: Plan of Care Review  Outcome: Ongoing (interventions implemented as appropriate)    11/07/17 0549   Coping/Psychosocial Response Interventions   Plan Of Care Reviewed With patient   Patient Care Overview   Progress no change   Outcome Evaluation   Outcome Summary/Follow up Plan PT SLEPT ON AND OFF THIS SHIFT, WHEN ASLEEP AND CALM, O2STAS STAYS IN LOW 90'S ON 4LPMN, WHEN AWAKE AND RESTLESS O2 ANA DROPS TO 80'S, STAFF HAS TO PROVDE REASSURANCES TO EASE AND CALM PT. LUNGS SOUNDS DIMINISHED, NO CRACKLES OR WHEEZES. WATCHED CLOSELY. NO C/O PAIN. REMAINS AFIB INHEART MONITOR. NO ACUTE DISTRESS.        Goal: Adult Individualization and Mutuality  Outcome: Ongoing (interventions implemented as appropriate)  Goal: Discharge Needs Assessment  Outcome: Ongoing (interventions implemented as appropriate)    Problem: Fall Risk (Adult)  Goal: Absence of Falls  Outcome: Ongoing (interventions implemented as appropriate)    Problem: Skin Integrity Impairment, Risk/Actual (Adult)  Goal: Skin Integrity/Wound Healing  Outcome: Ongoing (interventions implemented as appropriate)    Problem: Pain, Acute (Adult)  Goal: Acceptable Pain Control/Comfort Level  Outcome: Ongoing (interventions implemented as appropriate)

## 2017-11-07 NOTE — CODE DOCUMENTATION
The patient's son, Sukhwinder, who is her POA was contacted and updated on his mothers status.  Dr. Dominguez is speaking to the son now.

## 2017-11-07 NOTE — CONSULTS
Patient's daughter came to  office and requested  to administer sacrament of the sick.  Engaged in end of life discussion (father knew dying -  one month ago, mother does not know and she is not going to tell mother).  contacted Father Mercy to administer sacrament.

## 2017-11-07 NOTE — CODE DOCUMENTATION
A palliative discussion was had with the son by Dr. Dominguez, The patient's son is on his way to the hospital.  At this time Dr. Dominguez would like the patient to remain in her current room.

## 2017-11-07 NOTE — PLAN OF CARE
Problem: Patient Care Overview (Adult)  Goal: Plan of Care Review  Outcome: Ongoing (interventions implemented as appropriate)    11/07/17 1802   Coping/Psychosocial Response Interventions   Plan Of Care Reviewed With patient   Patient Care Overview   Progress no change   Outcome Evaluation   Outcome Summary/Follow up Plan Pt from tele today. Pt recieved IV Dilaudid x2 for pain and to help with breathing. Pt resting comfortably. Will continue to monitor.

## 2017-11-07 NOTE — CODE DOCUMENTATION
Patient's oxygenation improved with the 10L high flow, O2 was decreased to 5L. Dr. Angelica villarreal

## 2017-11-08 NOTE — CONSULTS
received page from Palliative.  Per daughter, mother in process of dying and needed anointing. Mother  had not been to her parish in years & needed assistance finding a  from a . Fr. Travis arrived later in the evening after apparent miscommunication with Fr Madrid from earlier in the day. Family is appreciative.

## 2017-11-08 NOTE — PROGRESS NOTES
Discharge Planning Assessment  [facility]     Patient Name: Pooja Duque  MRN: [id number]  Today's Date: 2017    Admit Date: 10/22/2017          Discharge Needs Assessment     None            Discharge Plan       17 1044    Case Management/Social Work Plan    Additional Comments The patient transferred to [facility] from [facility] on 17 @ 12:10. The patient was palliative. The patient  on 17 @ 02:17. JOURDAN Evans, RN, CCP    Final Note    Final Note The patient  on 17 @ 02:17. JOURDAN Evans, RN, CCP        Discharge Placement     Facility/Agency Request Status Selected? Address Phone Number Fax Number    [facility] Accepted     [address] [phone]        Leanna Lord, DUSTIN 2017 10:18    Pt is current.  They are following.               [facility] Accepted     [address] [facility] [phone]        Violette Carr RN 10/25/2017 12:41    Called to [facility] Pending - No Request Sent     [address] [facility] [id number] [phone]        Leanna Lord, DUSTIN 2017 18:12    Facility's DON will visit on Friday 11/3 to make sure pt is OK to return                   Expected Discharge Date and Time     Expected Discharge Date Expected Discharge Time    2017               Demographic Summary     None            Functional Status     None            Psychosocial     None            Abuse/Neglect     None            Legal     None            Substance Abuse     None            Patient Forms     None          Ana Rosa Evans, RN

## 2017-11-08 NOTE — PROGRESS NOTES
Case Management Discharge Note    Final Note: The patient  on 17 @ 02:17. JOURDAN Evans, RN, CCP    Discharge Placement     Facility/Agency Request Status Selected? Address Phone Number Fax Number    [facility] Accepted     [address], [place] [phone]        Leanna Lord, RN 2017 10:18    Pt is current.  They are following.               [facility] Accepted     [address] , [facility] [phone]        Violette Carr, DUSTIN 10/25/2017 12:41    Called to [facility] Pending - No Request Sent     [address] [facility] [address], [facility] [address] [phone]        Leanna Lord, DUSTIN 2017 18:12    Facility's DON will visit on Friday 11/3 to make sure pt is OK to return                        Discharge Codes: 20

## 2017-11-15 NOTE — DISCHARGE SUMMARY
PHYSICIAN DISCHARGE SUMMARY                                                                        [facility]    Patient Identification:  Name: Pooja Duque  Age: 85 y.o.  Sex: female  :  1931  MRN: [id number]  Primary Care Physician: Justin Jordan MD    Admit date: 10/22/2017  Discharge date and time:2017  Discharged Condition:     Discharge Diagnoses:  Active Hospital Problems (** Indicates Principal Problem)    Diagnosis Date Noted   • **Acute respiratory failure with hypoxia and hypercapnia [J96.01, J96.02] 10/23/2017   • PNA (pneumonia) [J18.9] 2017   • Hypernatremia [E87.0] 10/26/2017   • DNR (do not resuscitate) [Z66] 10/24/2017   • Hypertension [I10] 2016   • On home O2 [Z99.81] 2016   • Chronic low back pain [M54.5, G89.29] 2016   • Acute exacerbation of chronic obstructive airways disease [J44.1] 2016   • Acute on chronic diastolic heart failure [I50.33] 2016      Resolved Hospital Problems    Diagnosis Date Noted Date Resolved   No resolved problems to display.      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   • Benign colonic polyp K63.5   • Atherosclerosis of coronary artery I25.10   • COPD, severe J44.9   • Chest pressure R07.89   • Acute on chronic diastolic heart failure I50.33   • CN (constipation) K59.00   • Acute exacerbation of chronic obstructive airways disease J44.1   • Bleeding from the nose R04.0   • Bloodgood disease N60.19   • Hypertension I10   • Hypoxia R09.02   • Eczema intertrigo L30.4   • Leg cramp R25.2   • Chronic low back pain M54.5, G89.29   • OP (osteoporosis) M81.0   • Left leg pain M79.605   • Basal cell papilloma L82.1   • Superficial thrombophlebitis of leg I80.00   • On home O2 Z99.81   • Intractable vomiting with nausea  R11.2   • Acute on chronic respiratory failure with hypoxia J96.21   • Hospital discharge follow-up Z09   • Bilateral leg edema R60.0   • Anemia D64.9   • Abdominal bloating R14.0   • Medicare annual wellness visit, initial Z00.00   • Wheelchair bound Z99.3   • Pneumonia of right lower lobe due to infectious organism J18.1   • Atrial flutter I48.92   • Non-intractable vomiting with nausea R11.2   • Acute kidney injury N17.9   • Acute respiratory failure with hypoxia and hypercapnia J96.01, J96.02   • DNR (do not resuscitate) Z66   • Hypernatremia E87.0   • PNA (pneumonia) J18.9       PMHX:   Past Medical History:   Diagnosis Date   • Anemia    • Atherosclerosis of coronary artery 2/1/2016   • London esophagus 2/1/2016   • Cancer    • Candidiasis    • CHF (congestive heart failure)    • Chronic diastolic heart failure 2/1/2016   • Colon polyp    • COPD (chronic obstructive pulmonary disease)    • COPD, severe 2/1/2016   • FLOYD (dyspnea on exertion)    • Eczema intertrigo 2/1/2016   • Esophageal reflux    • Fracture, carpal bone    • Hiatal hernia    • Hypertension    • Low back pain    • Macular degeneration    • On home O2 2/1/2016   • Osteopenia    • Osteoporosis    • PND (post-nasal drip) 2/1/2016   • Pneumonia    • Superficial thrombophlebitis of leg 2/1/2016     PSHX:   Past Surgical History:   Procedure Laterality Date   • APPENDECTOMY     • FOOT SURGERY Bilateral    • VERTEBRAL AUGMENTATION  10/02/2015    percutaneous vertebral augmentation kyphoplasty- T8- Dr. Alejandro    • WRIST SURGERY Left        Hospital Course: Pooja Duque  is a 85-year-old female who was hospitalized from 10/14/2017 through 10/19/2017 when she presented to the hospital with acute on chronic respiratory failure suspected to be due to underlying pneumonia with COPD exacerbation.  She was treated with broad-spectrum antibiotic and was treated with IV Lasix for several days until a day prior to the discharge when it was  discontinued.  Patient was switched to oral antibiotics to complete the treatment.  According to the patient after she left the hospital she continued to struggle with breathing and activity label was very limited.  The nurse who checks on her convinced her  that she needs to go back to the emergency room because she was not improving.  Patient is unable to say specifically what is not getting any better but after multiple back and forth conversation she was able to say that she still gets out of breath easily and have some cough.  She denies any fever and chills.  She also admits that she is having diarrhea which she attributes to the stool softener that has been added to her regimen without her knowledge.  She denies any abdominal cramps and fever.    Patient stated that she had significant anxiety and these episodes of shortness of breath makes her anxiety worse with perpetuate her shortness of breath.     After being in the hospital for several days family went for palliative care and comfort measures.  The patient was kept comfortable and  from her illness.      Consults:     Consults     Date and Time Order Name Status Description    10/28/2017 1350 Inpatient Consult to Cardiology Completed     10/23/2017 0843 Inpatient Consult to Pulmonology Completed     10/22/2017 1739 [facility] (on-call MD unless specified) Completed     10/22/2017 1734 [facility] (on-call MD unless specified) Completed     10/14/2017 0906 Inpatient Consult to Cardiology Completed     10/14/2017 0300 [facility] (on-call MD unless specified) Completed                 Significant Diagnostic Studies: No results found for: WBC, HGB, HCT, PLT  No results found for: NA, K, CL, CO2, BUN, CREATININE, GLUCOSE  No results found for: CALCIUM, MG, PHOS  No results found for: AST, ALT, ALKPHOS  No results found for: APTT, INR  No results found for: COLORU, CLARITYU, SPECGRAV, PHUR, PROTEINUR, GLUCOSEU, KETONESU, BLOODU, NITRITE, LEUKOCYTESUR, BILIRUBINUR,  UROBILINOGEN, RBCUA, WBCUA, BACTERIA  No results found for: TROPONINT, TROPONINI, BNP  No components found for: HGBA1C;2  No components found for: TSH;2  Imaging Results (all)     Procedure Component Value Units Date/Time    XR Chest 1 View [[id number]] Collected:  10/22/17 1657     Updated:  10/22/17 1706    Narrative:       PORTAL CHEST SINGLE VIEW     HISTORY: Morbidly obese 85-year-old female with history of hypertension,  COPD, congestive failure, and London's esophagus presents for  evaluation of shortness of breath.     COMPARISON: 10/16/2017     FINDINGS:  1. Limited imaging due to body habitus portable technique and low lung  volumes.  2. Persistent opacification right base suggesting atelectasis and  effusion.  3. Prominent cardiomegaly.  4. Underlying chronic pulmonary change.  5. Suspect hydrostatic edema, can't rule out underlying pneumonia.  6. Follow-up PA and lateral would be helpful in patient's condition  permits, alternatively CT may be useful.     This report was finalized on 10/22/2017 5:03 PM by Dr. Anthony Ware MD.       XR Chest 1 View [[id number]] Collected:  10/23/17 0944     Updated:  10/23/17 0948    Narrative:       PORTAL CHEST SINGLE VIEW 08:45     HISTORY: 85-year-old morbidly obese female with decreased O2 saturation     COMPARISON: 10/22/2017     FINDINGS:  1. Limited imaging due to body habitus portable technique and low lung  volumes.  2. Question mild bibasilar atelectasis and pleural fluid, no interval  change.  3. Cardiac enlargement and underlying chronic lung disease.  4. Follow-up with lateral view or CT chest would be helpful for more  accurate assessment     This report was finalized on 10/23/2017 9:45 AM by Dr. Anthony Ware MD.       CT Angiogram Chest With & Without Contrast [[id number]] Collected:  10/23/17 1151     Updated:  10/23/17 1155    Narrative:       CT ANGIOGRAM OF THE CHEST. MULTIPLE CORONAL, SAGITTAL, AND 3-D  RECONSTRUCTIONS.     HISTORY: 85-year-old female  with acute respiratory failure.     TECHNIQUE: Radiation dose reduction techniques were utilized, including  automated exposure control and exposure modulation based on body size.   CT angiogram of the chest was performed. Multiple coronal, sagittal, and  3-D reconstruction images were obtained. Compared with chest radiograph  performed earlier today as well as noncontrasted chest CT from  02/26/2016.     FINDINGS: There is adequate opacification of the pulmonary arteries and  branches. There are no filling defects or evidence for pulmonary  thromboemboli. The pulmonary arteries are enlarged measuring 3.3 cm  transversely, previously up to 3.0 cm. There is bronchial thickening of  both the lower lobes and there are airspace consolidations at the  dependent aspect of both lower lobes. Small right and tiny left pleural  effusions are present. There is no pericardial effusion. There are  moderately extensive underlying emphysematous changes. There is no  lymphadenopathy within the chest.       Impression:       1. There is no evidence for pulmonary thromboemboli.  2. The airspace consolidations at both lower lobes are suspicious for  pneumonia. Small right and tiny left pleural effusions.  3. Moderately extensive emphysema. Enlarged pulmonary arteries suggest  pulmonary arterial hypertension.     This report was finalized on 10/23/2017 11:52 AM by Dr. Neema Coppola MD.       XR Chest PA & Lateral [[id number]] Collected:  10/27/17 1140     Updated:  10/27/17 1507    Narrative:       PA AND LATERAL CHEST 10/27/2017      HISTORY: Pneumonia.     FINDINGS: Heart size is mild to moderately enlarged. There is bilateral  vascular congestion. There may be some atelectasis or mild pneumonia in  the right lung base and small right pleural effusion. Vertebroplasty of  a mid to lower thoracic vertebra is seen. Compression deformities of 2  or 3 additional lower thoracic vertebrae are seen.       Impression:       1. Cardiomegaly and  mild vascular congestion.  2. Right lower lobe atelectasis and/or pneumonia and small right pleural  effusion.     This report was finalized on 10/27/2017 3:04 PM by Dr. Fran Norman MD.       FL Video Swallow [[id number]] Collected:  10/31/17 1233     Updated:  10/31/17 1255    Narrative:       VIDEO ESOPHAGRAM     HISTORY: Dysphagia.     FINDINGS: The patient exhibits mild oropharyngeal dysphagia with an  episode of aspiration of thin liquids after the swallow. There is also  some deep penetration with juice wash following mechanical soft. Please  also see the speech therapist's report. The fluoroscopy time measures 2  minutes and 14 seconds.     This report was finalized on 10/31/2017 12:52 PM by Dr. Dustin Trinh MD.       US Thoracentesis Right [[id number]] Collected:  11/03/17 1851     Updated:  11/03/17 1918    Narrative:       ULTRASOUND OF RIGHT CHEST FOR THORACENTESIS     FINDINGS: The ultrasound evaluation shows only a small right pleural  effusion. No diagnostic orders were submitted and the size of the  effusion is considered too small for therapeutic purposes.     This report was finalized on 11/3/2017 7:15 PM by Dr. Dustin Trinh MD.       XR Chest 1 View [[id number]] Collected:  11/05/17 1803     Updated:  11/05/17 1808    Narrative:       AP CHEST X-RAY     CLINICAL HISTORY: Respiratory failure. Follow-up pneumonia.     Compared to the previous chest x-ray dated 10/27/2017.     The lungs are slightly better inflated than on the previous chest x-ray.  Ill-defined increased density at the right lung base has diminished.  This is consistent with improving infiltrate and also probable decrease  in size of a small right pleural effusion. Left lung remains grossly  free of infiltrates. The heart is moderately enlarged and unchanged.     This report was finalized on 11/5/2017 6:05 PM by Dr. Chavo Domínguez MD.       XR Chest 1 View [[id number]] Collected:  11/07/17 0937     Updated:  11/07/17 0948     Narrative:       PORTABLE UPRIGHT CHEST     COMPARISON: AP chest 11/05/2017.     HISTORY: Increasing respiratory failure.     FINDINGS: There is progressive interstitial disease representing  hydrostatic interstitial pulmonary edema versus atypical interstitial  infiltrates. Patient is rotated to the right. Heart size is enlarged.  There are small to moderate pleural effusions.       Impression:       Progressive interstitial disease when compared to the exam 2  days prior due to increasing hydrostatic pulmonary edema or atypical  interstitial infiltrates.     Discussed with Jovanna, the nurse caring for the patient, on 11/07/2017  at 9:08 AM.     This report was finalized on 11/7/2017 9:45 AM by Dr. Levi Diana MD.       FL Video Swallow [[id number]] Collected:  11/07/17 1657     Updated:  11/07/17 1701    Narrative:       VIDEO SWALLOW STUDY     HISTORY: Dysphagia.     3 minutes 22 seconds fluoroscopy was provided for the speech pathologist  during a video swallow study.      Laryngeal penetration was observed with mixed texture and there was also  penetration with thin liquids related to early spillage.       Impression:       Fluoroscopy was provided for the speech pathologist during a  video swallow study. For full details please see the speech pathology  report     This report was finalized on 11/7/2017 4:58 PM by Dr. Larry Mendoza MD.           Lab Results (last 7 days)     Procedure Component Value Units Date/Time    Sodium [[id number]]  (Abnormal) Collected:  11/01/17 1059    Specimen:  Blood Updated:  11/01/17 1134     Sodium 149 (H) mmol/L     CBC & Differential [[id number]] Collected:  11/02/17 0616    Specimen:  Blood Updated:  11/02/17 0731    Narrative:       The following orders were created for panel order CBC & Differential.  Procedure                               Abnormality         Status                     ---------                               -----------         ------                      CBC Auto Differential[[id number]]        Abnormal            Final result                 Please view results for these tests on the individual orders.    CBC Auto Differential [[id number]]  (Abnormal) Collected:  11/02/17 0616    Specimen:  Blood Updated:  11/02/17 0731     WBC 8.80 10*3/mm3      RBC 4.01 10*6/mm3      Hemoglobin 11.3 (L) g/dL      Hematocrit 39.9 %      MCV 99.5 (H) fL      MCH 28.2 pg      MCHC 28.3 (L) g/dL      RDW 17.8 (H) %      RDW-SD 65.0 (H) fl      MPV 11.1 fL      Platelets 167 10*3/mm3      Neutrophil % 76.9 (H) %      Lymphocyte % 14.2 (L) %      Monocyte % 6.9 %      Eosinophil % 1.1 %      Basophil % 0.1 %      Immature Grans % 0.8 (H) %      Neutrophils, Absolute 6.76 10*3/mm3      Lymphocytes, Absolute 1.25 10*3/mm3      Monocytes, Absolute 0.61 10*3/mm3      Eosinophils, Absolute 0.10 10*3/mm3      Basophils, Absolute 0.01 10*3/mm3      Immature Grans, Absolute 0.07 (H) 10*3/mm3     Basic Metabolic Panel [[id number]]  (Abnormal) Collected:  11/02/17 0616    Specimen:  Blood Updated:  11/02/17 0732     Glucose 104 (H) mg/dL      BUN 34 (H) mg/dL      Creatinine 0.75 mg/dL      Sodium 149 (H) mmol/L      Potassium 3.6 mmol/L      Chloride 98 mmol/L      CO2 41.4 (H) mmol/L      Calcium 8.8 mg/dL      eGFR Non African Amer 73 mL/min/1.73      BUN/Creatinine Ratio 45.3 (H)     Anion Gap 9.6 mmol/L     Narrative:       The MDRD GFR formula is only valid for adults with stable renal function between ages 18 and 70.    aPTT [[id number]]  (Normal) Collected:  11/02/17 1154    Specimen:  Blood Updated:  11/02/17 1236     PTT 24.6 seconds     Protime-INR [[id number]]  (Normal) Collected:  11/02/17 1154    Specimen:  Blood Updated:  11/02/17 1236     Protime 12.6 Seconds      INR 0.98    Sodium [[id number]]  (Normal) Collected:  11/02/17 1415    Specimen:  Blood Updated:  11/02/17 1512     Sodium 144 mmol/L     CBC & Differential [[id number]] Collected:  11/03/17 0647    Specimen:  Blood  Updated:  11/03/17 0754    Narrative:       The following orders were created for panel order CBC & Differential.  Procedure                               Abnormality         Status                     ---------                               -----------         ------                     CBC Auto Differential[[id number]]        Abnormal            Final result                 Please view results for these tests on the individual orders.    CBC Auto Differential [[id number]]  (Abnormal) Collected:  11/03/17 0647    Specimen:  Blood Updated:  11/03/17 0754     WBC 7.29 10*3/mm3      RBC 3.83 (L) 10*6/mm3      Hemoglobin 11.0 (L) g/dL      Hematocrit 38.1 %      MCV 99.5 (H) fL      MCH 28.7 pg      MCHC 28.9 (L) g/dL      RDW 17.7 (H) %      RDW-SD 64.1 (H) fl      MPV 11.5 fL      Platelets 140 10*3/mm3      Neutrophil % 75.9 %      Lymphocyte % 15.9 (L) %      Monocyte % 5.5 %      Eosinophil % 1.8 %      Basophil % 0.1 %      Immature Grans % 0.8 (H) %      Neutrophils, Absolute 5.53 10*3/mm3      Lymphocytes, Absolute 1.16 10*3/mm3      Monocytes, Absolute 0.40 10*3/mm3      Eosinophils, Absolute 0.13 10*3/mm3      Basophils, Absolute 0.01 10*3/mm3      Immature Grans, Absolute 0.06 (H) 10*3/mm3     Basic Metabolic Panel [[id number]]  (Abnormal) Collected:  11/03/17 0647    Specimen:  Blood Updated:  11/03/17 0801     Glucose 109 (H) mg/dL      BUN 35 (H) mg/dL      Creatinine 0.68 mg/dL      Sodium 146 (H) mmol/L      Potassium 3.6 mmol/L      Chloride 98 mmol/L      CO2 43.0 (H) mmol/L      Calcium 9.4 mg/dL      eGFR Non African Amer 82 mL/min/1.73      BUN/Creatinine Ratio 51.5 (H)     Anion Gap 5.0 mmol/L     Narrative:       The MDRD GFR formula is only valid for adults with stable renal function between ages 18 and 70.    Basic Metabolic Panel [[id number]]  (Abnormal) Collected:  11/04/17 0425    Specimen:  Blood Updated:  11/04/17 0501     Glucose 147 (H) mg/dL      BUN 38 (H) mg/dL      Creatinine 0.91 mg/dL       Sodium 146 (H) mmol/L      Potassium 3.6 mmol/L      Chloride 98 mmol/L      CO2 40.7 (H) mmol/L      Calcium 10.1 mg/dL      eGFR Non African Amer 59 (L) mL/min/1.73      BUN/Creatinine Ratio 41.8 (H)     Anion Gap 7.3 mmol/L     Narrative:       The MDRD GFR formula is only valid for adults with stable renal function between ages 18 and 70.    Digoxin Level [[id number]]  (Normal) Collected:  11/04/17 0425    Specimen:  Blood Updated:  11/04/17 0503     Digoxin 0.80 ng/mL     Basic Metabolic Panel [[id number]]  (Abnormal) Collected:  11/05/17 0723    Specimen:  Blood Updated:  11/05/17 0855     Glucose 132 (H) mg/dL      BUN 26 (H) mg/dL      Creatinine 0.78 mg/dL      Sodium 148 (H) mmol/L      Potassium 3.9 mmol/L      Chloride 100 mmol/L      CO2 39.6 (H) mmol/L      Calcium 9.2 mg/dL      eGFR Non African Amer 70 mL/min/1.73      BUN/Creatinine Ratio 33.3 (H)     Anion Gap 8.4 mmol/L     Narrative:       The MDRD GFR formula is only valid for adults with stable renal function between ages 18 and 70.    Blood Gas, Arterial [[id number]]  (Abnormal) Collected:  11/05/17 1916    Specimen:  Arterial Blood Updated:  11/05/17 1922     Site Arterial: right radial     Maxime's Test Positive     pH, Arterial 7.455 (H) pH units      pCO2, Arterial 61.7 (C) mm Hg       Critical:Notify DUSTIN MARINO (05-Nov-17 19:21:00)Read back ok        pO2, Arterial 70.9 (L) mm Hg      HCO3, Arterial 43.4 (H) mmol/L      Base Excess, Arterial 16.5 (H) mmol/L      O2 Saturation Calculated 94.1 %      Barometric Pressure for Blood Gas 744.5 mmHg      Modality Cannula     Flow Rate 4 lpm      Set [facility] Resp Rate 20    Narrative:       SPO2 94% Meter: [id number] : [id number] Mason Betts    Basic Metabolic Panel [[id number]]  (Abnormal) Collected:  11/06/17 0851    Specimen:  Blood Updated:  11/06/17 0929     Glucose 118 (H) mg/dL      BUN 25 (H) mg/dL      Creatinine 0.85 mg/dL      Sodium 145 mmol/L      Potassium 4.2 mmol/L       "Chloride 98 mmol/L      CO2 37.9 (H) mmol/L      Calcium 9.3 mg/dL      eGFR Non African Amer 64 mL/min/1.73      BUN/Creatinine Ratio 29.4 (H)     Anion Gap 9.1 mmol/L     Narrative:       The MDRD GFR formula is only valid for adults with stable renal function between ages 18 and 70.    POC Glucose Fingerstick [[id number]]  (Normal) Collected:  11/07/17 0827    Specimen:  Blood Updated:  11/07/17 0829     Glucose 124 mg/dL     Narrative:       Meter: [id number] : [id number] Javier Carolyn    Blood Gas, Arterial [[id number]]  (Abnormal) Collected:  11/07/17 0832    Specimen:  Arterial Blood Updated:  11/07/17 0837     Site Arterial: left radial     Maxime's Test Positive     pH, Arterial 7.234 (C) pH units       Critical:Notify DUSTIN KAMINSKI (07-Nov-17 08:35:29)Read back ok        pCO2, Arterial 106.4 (C) mm Hg       Critical:Notify DUSTIN KAMINSKI (07-Nov-17 08:35:29)Read back ok        pO2, Arterial 139.2 (H) mm Hg      HCO3, Arterial 44.9 (H) mmol/L      Base Excess, Arterial 13.0 (H) mmol/L      O2 Saturation Calculated 98.3 %      Barometric Pressure for Blood Gas 749.7 mmHg      Modality HFNC     Flow Rate 10 lpm      Rate 30 Breaths/minute     Narrative:       SATS 98% Meter: [id number] : [id number] Ramon Lewis    Basic Metabolic Panel [[id number]]  (Abnormal) Collected:  11/07/17 0637    Specimen:  Blood Updated:  11/07/17 0840     Glucose 117 (H) mg/dL      BUN 24 (H) mg/dL      Creatinine 0.63 mg/dL      Sodium 145 mmol/L      Potassium 4.1 mmol/L      Chloride 96 (L) mmol/L      CO2 39.7 (H) mmol/L      Calcium 9.5 mg/dL      eGFR Non African Amer 90 mL/min/1.73      BUN/Creatinine Ratio 38.1 (H)     Anion Gap 9.3 mmol/L     Narrative:       The MDRD GFR formula is only valid for adults with stable renal function between ages 18 and 70.        /52 (BP Location: Left arm, Patient Position: Lying)  Pulse (!) 0  Temp 98.5 °F (36.9 °C) (Oral)   Resp (!) 0  Ht 60.98\" (154.9 cm)  Wt 205 lb 11.2 " oz (93.3 kg)  SpO2 91%  BMI 38.89 kg/m2    Discharge Exam:     Disposition:      Patient Instructions:    Pooja Duque   Home Medication Instructions KYLEIGH:[id number]    Printed on:17   Medication Information                      acetaminophen (TYLENOL) 325 MG tablet  Take 2 tablets by mouth Every 6 (Six) Hours As Needed for Mild Pain .             albuterol (PROVENTIL HFA) 108 (90 BASE) MCG/ACT inhaler  Inhale 2 puffs Every 4 (Four) Hours As Needed for wheezing.             aspirin  MG tablet  Take 325 mg by mouth Daily.             BROVANA 15 MCG/2ML nebulizer solution  INHALE ONE VIAL BY MOUTH TWICE A DAY             budesonide (PULMICORT) 0.5 MG/2ML nebulizer solution  USE 1 VIAL PER NEBULIZER TWO TIMES A DAY (MAY MIX WITH BROVANA IN THE NEBULIZER)             busPIRone (BUSPAR) 5 MG tablet  Take 1.5 tablets by mouth 2 (Two) Times a Day.             calcium carbonate-vitamin d 600-400 MG-UNIT per tablet  TAKE 1 TABLET BY MOUTH DAILY             cefpodoxime (VANTIN) 200 MG tablet  Take 1 tablet by mouth Every 12 (Twelve) Hours.             digoxin (LANOXIN) 125 MCG tablet  Take 1 tablet by mouth Daily.             furosemide (LASIX) 40 MG tablet  TAKE 1 TABLET BY MOUTH DAILY             Loratadine (CLARITIN) 10 MG capsule  Take 1 tablet/day by mouth Daily.             LORazepam (ATIVAN) 0.5 MG tablet  Take 0.5 tablets by mouth Daily As Needed for Anxiety for up to 8 doses.             metoprolol succinate XL (TOPROL-XL) 100 MG 24 hr tablet  Take 2 tablets by mouth Daily.             Multiple Vitamins-Minerals (CENTRUM SILVER) tablet  Take 1 tablet by mouth Daily.             nystatin (MYCOSTATIN) 244159 UNIT/GM powder  Apply  topically Every 12 (Twelve) Hours.             pantoprazole (PROTONIX) 40 MG EC tablet  Take 40 mg by mouth Daily.             sennosides-docusate sodium (SENOKOT-S) 8.6-50 MG tablet  Take 2 tablets by mouth At Night As Needed for Constipation.              sodium chloride (OCEAN) 0.65 % nasal spray  1 spray into each nostril As Needed for Congestion.             Tamsulosin HCl (FLOMAX PO)  Take 0.4 capsules by mouth Daily.               No future appointments.  Follow-up Information     Follow up with Justin Jordan MD .    Specialty:  Internal Medicine    Contact information:    [address]  [address] [address]  [phone]          Follow up with KYM Ventura .    Specialty:  Nurse Practitioner    Contact information:    [address] [facility] [address]  [phone]          Discharge Order     Start     Ordered    17 0447  Discharge patient  Once     Expected Discharge Date:  17    Discharge Disposition:          17 0448          Total time spent discharging patient including evaluation,post hospitalization follow up,  medication and post hospitalization instructions and education total time exceeds 30 [email or web address]    Signed:  Ronny Delgado MD  2017  9:04 PM

## 2017-11-20 PROBLEM — J18.9 PNA (PNEUMONIA): Status: ACTIVE | Noted: 2017-11-20
